# Patient Record
Sex: MALE | Race: WHITE | NOT HISPANIC OR LATINO | Employment: STUDENT | URBAN - METROPOLITAN AREA
[De-identification: names, ages, dates, MRNs, and addresses within clinical notes are randomized per-mention and may not be internally consistent; named-entity substitution may affect disease eponyms.]

---

## 2017-01-16 ENCOUNTER — OFFICE VISIT (OUTPATIENT)
Dept: ORTHOPEDICS | Facility: CLINIC | Age: 22
End: 2017-01-16
Payer: COMMERCIAL

## 2017-01-16 VITALS — SYSTOLIC BLOOD PRESSURE: 125 MMHG | HEART RATE: 66 BPM | DIASTOLIC BLOOD PRESSURE: 75 MMHG | HEIGHT: 67 IN

## 2017-01-16 DIAGNOSIS — Z09 S/P ORTHOPEDIC SURGERY, FOLLOW-UP EXAM: Primary | ICD-10-CM

## 2017-01-16 DIAGNOSIS — M25.371 INSTABILITY OF ANKLE JOINT, RIGHT: ICD-10-CM

## 2017-01-16 PROCEDURE — 99999 PR PBB SHADOW E&M-EST. PATIENT-LVL III: CPT | Mod: PBBFAC,,, | Performed by: ORTHOPAEDIC SURGERY

## 2017-01-16 PROCEDURE — 99024 POSTOP FOLLOW-UP VISIT: CPT | Mod: S$GLB,,, | Performed by: ORTHOPAEDIC SURGERY

## 2017-01-17 NOTE — PROGRESS NOTES
Mr. Mendez returns today.  He is now about four weeks out from his right   anterolateral ligament reconstruction.  He has been in a fracture boot since his   last visit and he has remained nonweightbearing.  He reports his pain is well   controlled.  Examination reveals that his wounds are well healed.  There appears   to be good stability of the ankle with anterior drawer maneuver.  At this   point, I am going to let him start progressing his weightbearing and I am going   to have him start physical therapy.  I am going to have him return to see me in   six weeks.      JACK/EDMAR  dd: 01/16/2017 11:06:20 (CST)  td: 01/17/2017 04:46:54 (CST)  Doc ID   #[id number]  Job ID #[id number]    CC:

## 2017-01-23 ENCOUNTER — CLINICAL SUPPORT (OUTPATIENT)
Dept: REHABILITATION | Facility: HOSPITAL | Age: 22
End: 2017-01-23
Attending: ORTHOPAEDIC SURGERY
Payer: COMMERCIAL

## 2017-01-23 DIAGNOSIS — Z98.890 S/P ANKLE LIGAMENT REPAIR: ICD-10-CM

## 2017-01-23 PROCEDURE — 97162 PT EVAL MOD COMPLEX 30 MIN: CPT | Mod: PO

## 2017-01-23 PROCEDURE — 97112 NEUROMUSCULAR REEDUCATION: CPT | Mod: PO

## 2017-01-23 NOTE — PROGRESS NOTES
Name: Flo Mendez Jr.  Clinic Number: [id number]  01/23/2017.     Diagnosis: instability of R ankle   Physician: Gerald Ruiz MD  Treatment Orders: PT Eval and Treat    No past medical history on file.  No current outpatient prescriptions on file.     No current facility-administered medications for this visit.      Review of patient's allergies indicates:  No Known Allergies  Precautions: WB as tolerated      Subjective:    Previous PT: yes; hip surgery last year on R hip; states no problems with hip; the expectation was that fixing the hip would help the back, but it didn't   Work history: in school, studying finance at [employer]   Recreational activities/exercise program: recently, not much; was running but it hurt his back. Would like to get back to it.      Flo is a 21 y.o. male referred to PT s/p Right anterolateral ligament reconstruction, Brostrom procedure with Arthrex internal brace augmentation, performed on 12/13/2016. He was NWB until 1 week ago. Was instructed to walk until sxs start, and he can start walking without the boot around the house, per sxs. Initially injured the ankle 6 years ago playing soccer. States he broke something with it with the injury. That injury was repaired.  Was also having back pain for 2 years, unknown origin, and the hope was to improve walking motion to help relieve back pain. No specific injury to back. States no structural problems, states muscles are tight. Activities that increase in back are sitting for prolonged periods of time and intense activities. Laying flat helps his back. Was doing back specific things at PT but it didn't help his back. Would like to get back to running. Currently can walk about 1 hour without sxs, sxs started at a party after 2 hours. sxs will go away after sitting for a little bit. States swelling has gone down a lot.     Pain is rated on a 0-10 scale with 0 being no pain and 10 being pain requiring emergency room  "visit.    Diagnostic Tests: MRI    Patient Goals for PT: return to running, normal walking, recreational sports      Objective:      Visit # 1  Time In: 1100  Time Out: 1200  Treatment time: 60  Date of eval/re-eval: 1/23/2017    DTR R/L: Biceps C5/6 NL / NL Patella L4 NL / NL Achilles S2 NL / NL DF NL / NL   NL 1+, 2+, 3+; ABS 0; HYP 4+; RAYMON 5+    Light touch R/L: Ant thigh L2 NL / NL Med knee L3 NL / NL Med malleolus L4 NL / NL Med heel L5 NL / NL Lat foot S1 NL / NL Med foot S2 NL / NL    NL intact; DIM diminished     Alignment/appearance:   Thoracic spine:  --> nl --> inc   Lumbar spine:  --> nl --> inc  Pelvic tilt:  --> ant    Pelvic rotation:   nl     Strength R/L WFl throughout LE except    Heel raise S1 2x  / 15x       AROM tests R/L   Selective Functional Movement Assessment (SFMA)  Coding:  FN = functional non-painful FP = functional painful     DP = dysfunctional painful  DN = dysfunctional non-painful     Multi-segmental flexion FP    Return from flexion -  Multi-segmental extension FP    Multi-segmental rotation FP / DN    Trunk side-bend nt / nt   Single leg balance unable / 30"    Deep squat nt  SL squat nt / nt     gillet test/march in place excessive R rotation with R march     PT Evaluation Complete? YES  Discussed Plan of Care with patient: YES     [2] Neuromuscular re-ed x 30'  Prone hip extension over pillow to reduce l/s extension; 3 x 8 LLE cues to engage glute first   Quadruped shoulder flexion elbow to hip 2 x 8 done with exhale   90/90 breathing with RLE weight bearing on wall/isometrics 8x breath cycles      Written HEP Provided: yes  Patient education: HEP; protocols  Flo reid good understanding of the education provided. Patient demo good return demo of skill of exercises.    Problem List: muscle length impairments, decreased motor control and mobility, impaired ADLs, activity and participation restrictions.     Personal factors - back pain; multiple surgeries     CPT Code reference    " "    Hx  Exam  Presentation   Code     Low     0   1-2    Stable    78496     Mod     1-2   3    Evolving    56196     High     3+   4+     Unstable    61200       Assessment:    Physical therapy diagnosis: lumbar rotation syndrome, ankle pronation syndrome  Rehab potential: good    Flo presents with the above listed impairments.  Excessive low back rotation compensation 2/2 impairments in the RLE. Balance deficits noted on RLE/ankle.    Flo will benefit from skilled PT services to address these impairments and progress towards the below listed functional goals.  Flo is in agreement with the goals that will be addressed in the treatment plan.Flo demonstrates no additional cultural, spiritual or educational needs and currently has no barriers to learning.      Medical necessity: see problem list -  indicates  / mod /  complexity based on clinical presentation that is  / evolving based on sxs in low back affecting rehab and contributingto LE impairments.       Functional Goals  Time frame     1.   The pt will perform SLB on RLE for > 30" indicated improved functional strength.   6 weeks     2.   The pt will demonstrate > 15 heel raises on RLE for improved functional strength.   6 weeks     3.   The pt will ambulate safely and [I] without sxs for > 2 hours without increase in sxs.   6 weeks     4.   The pt will be independent in performance and progression of HEP.     2-3 visits            Plan:      Proceed/Continue with POC.     Pt will be treated by physical therapy 1-2x/week during the certification period. Treatment will consist of therapeutic exercise, manual therapy, neuromuscular re-education, heat and cold modalities, electrical stimulation for pain relief and/or muscle activation, and any other types of treatment hat may be deemed medically necessary. Flo may at times be seen by a PTA as part of the Rehab Team.       Physical Therapist: AYO Ayoub, PT, DPT, CSCS    I certify the need for these " services furnished under this plan of treatment and while under my care.       ___________________________________  Physician/Referring Practitioner        _________________  Date of Signature

## 2017-01-30 ENCOUNTER — CLINICAL SUPPORT (OUTPATIENT)
Dept: REHABILITATION | Facility: HOSPITAL | Age: 22
End: 2017-01-30
Attending: ORTHOPAEDIC SURGERY
Payer: COMMERCIAL

## 2017-01-30 DIAGNOSIS — Z98.890 S/P ANKLE LIGAMENT REPAIR: ICD-10-CM

## 2017-01-30 PROCEDURE — 97112 NEUROMUSCULAR REEDUCATION: CPT

## 2017-02-03 ENCOUNTER — CLINICAL SUPPORT (OUTPATIENT)
Dept: REHABILITATION | Facility: HOSPITAL | Age: 22
End: 2017-02-03
Attending: ORTHOPAEDIC SURGERY
Payer: COMMERCIAL

## 2017-02-03 DIAGNOSIS — Z98.890 S/P ANKLE LIGAMENT REPAIR: ICD-10-CM

## 2017-02-03 PROCEDURE — 97112 NEUROMUSCULAR REEDUCATION: CPT | Mod: PO

## 2017-02-03 NOTE — PROGRESS NOTES
"Name: Flo Mendez Jr.  Clinic Number: [id number]  02/03/2017.     Diagnosis: instability of R ankle   Physician: Gerald Ruiz MD  Treatment Orders: PT Eval and Treat    No past medical history on file.  No current outpatient prescriptions on file.     No current facility-administered medications for this visit.      Review of patient's allergies indicates:  No Known Allergies  Precautions: WB as tolerated      Subjective:    Previous PT: yes; hip surgery last year on R hip; states no problems with hip; the expectation was that fixing the hip would help the back, but it didn't   Work history: in school, studying finance at [employer]   Recreational activities/exercise program: recently, not much; was running but it hurt his back. Would like to get back to it.      Flo states sxs on foot when trying to balance. More sxs with shoes than without.     Pain is rated on a 0-10 scale with 0 being no pain and 10 being pain requiring emergency room visit.    Diagnostic Tests: MRI    Patient Goals for PT: return to running, normal walking, recreational sports      Objective:      Visit # 3  Time In: 1000  Time Out: 1100  Treatment time: 60  Date of eval/re-eval: 1/23/2017        SLB RLE 17"   Side planks R/L 35" / 39"   Heel raises RLE 10x     [2] Neuromuscular re-ed x 30'  Inv/ever 20x GTB  Breathing drills forced exhale 15x   Prone hip extension over pillow 8x R/L, no pillow 8x R/L   Bird dog working arm off table 3x8 LUE, 8x RLE  Standing hip ext 8x, 8x GTB R/L with ski pole   plyo toss 2 hand 2 x 20 R/L   Tandem walking 2 x 10' 15#, 2 x 10' 25#  SLB leaning over table 3 x 30"   Heel raises 2 x 12 R/L leaning over table toes elevated on towel          Written HEP Provided: yes  Patient education: HEP items in standing or in modified quadruped based on sxs.   Flo mathews good understanding of the education provided. Patient demo good return demo of skill of exercises.    Problem List: muscle length impairments, " "decreased motor control and mobility, impaired ADLs, activity and participation restrictions.     Personal factors - back pain; multiple surgeries     CPT Code reference        Hx  Exam  Presentation   Code     Low     0   1-2    Stable    84060     Mod     1-2   3    Evolving    60235     High     3+   4+     Unstable    97580       Assessment:    Physical therapy diagnosis: lumbar rotation syndrome, ankle pronation syndrome  Rehab potential: good    Flo tolerated tx without increase in sxs. Balance improving. Tends to rotate to L with activities.     Flo will benefit from skilled PT services to address these impairments and progress towards the below listed functional goals.  Flo is in agreement with the goals that will be addressed in the treatment plan.Flo demonstrates no additional cultural, spiritual or educational needs and currently has no barriers to learning.      Medical necessity: see problem list -  indicates  / mod /  complexity based on clinical presentation that is  / evolving based on sxs in low back affecting rehab and contributingto LE impairments.       Functional Goals  Time frame     1.   The pt will perform SLB on RLE for > 30" indicated improved functional strength.   6 weeks     2.   The pt will demonstrate > 15 heel raises on RLE for improved functional strength.   6 weeks     3.   The pt will ambulate safely and [I] without sxs for > 2 hours without increase in sxs.   6 weeks     4.   The pt will be independent in performance and progression of HEP.     2-3 visits            Plan:      Proceed/Continue with POC.     Pt will be treated by physical therapy 1-2x/week during the certification period. Treatment will consist of therapeutic exercise, manual therapy, neuromuscular re-education, heat and cold modalities, electrical stimulation for pain relief and/or muscle activation, and any other types of treatment hat may be deemed medically necessary. Flo may at times be seen by a PTA as " part of the Rehab Team.       Physical Therapist: AYO Ayoub, PT, DPT, CSCS    I certify the need for these services furnished under this plan of treatment and while under my care.       ___________________________________  Physician/Referring Practitioner        _________________  Date of Signature

## 2017-02-10 ENCOUNTER — CLINICAL SUPPORT (OUTPATIENT)
Dept: REHABILITATION | Facility: HOSPITAL | Age: 22
End: 2017-02-10
Attending: ORTHOPAEDIC SURGERY
Payer: COMMERCIAL

## 2017-02-10 DIAGNOSIS — Z98.890 S/P ANKLE LIGAMENT REPAIR: ICD-10-CM

## 2017-02-10 PROCEDURE — 97112 NEUROMUSCULAR REEDUCATION: CPT | Mod: PO

## 2017-02-10 PROCEDURE — 97110 THERAPEUTIC EXERCISES: CPT | Mod: PO

## 2017-02-10 PROCEDURE — 97140 MANUAL THERAPY 1/> REGIONS: CPT | Mod: PO

## 2017-02-10 NOTE — PROGRESS NOTES
"Name: Flo Mendez Jr.  Clinic Number: [id number]  02/10/2017.     Diagnosis: instability of R ankle   Physician: Gerald Ruiz MD  Treatment Orders: PT Eval and Treat    No past medical history on file.  No current outpatient prescriptions on file.     No current facility-administered medications for this visit.      Review of patient's allergies indicates:  No Known Allergies  Precautions: WB as tolerated      Subjective:    Previous PT: yes; hip surgery last year on R hip; states no problems with hip; the expectation was that fixing the hip would help the back, but it didn't   Work history: in school, studying finance at [employer]   Recreational activities/exercise program: recently, not much; was running but it hurt his back. Would like to get back to it.      Flo reports sxs around incision with driving that radiate around to medial ankle. sxs are noticeable when driving.     Pain is rated on a 0-10 scale with 0 being no pain and 10 being pain requiring emergency room visit.    Diagnostic Tests: MRI    Patient Goals for PT: return to running, normal walking, recreational sports      Objective:      Visit # 4  Time In: 1000  Time Out: 1100  Treatment time: 60  Date of eval/re-eval: 1/23/2017        SLB RLE 17"   Side planks R/L 35" / 39"   Heel raises RLE 10x     Elliptical 5'     [1] Manual therapy x 15'   IASTM lateral ankle/incision/peroneal tendon    [2] Neuromuscular re-ed x 30'  Bird dog with slider 3 x 8   Ball toss 20x yellow, 20x blue, 20x blue toes elevated R/L   pallof press split stance 2 x 8 R/L facing/foot forward   Next visit - side plank, bird dog, dead bug     [1] Therapeutic exercise x 15'   B heel raises 2 x 20 on slant board   Single heel raise 2 x 12 R/L slant board   Next visit - leg press      Written HEP Provided: yes  Patient education: HEP items in standing or in modified quadruped based on sxs.   Flo mathews good understanding of the education provided. Patient demo good return " "demo of skill of exercises.    Problem List: muscle length impairments, decreased motor control and mobility, impaired ADLs, activity and participation restrictions.     Personal factors - back pain; multiple surgeries     CPT Code reference        Hx  Exam  Presentation   Code     Low     0   1-2    Stable    19053     Mod     1-2   3    Evolving    11314     High     3+   4+     Unstable    37723       Assessment:    Physical therapy diagnosis: lumbar rotation syndrome, ankle pronation syndrome  Rehab potential: good    Flo tolerated tx without increase in sxs. Reduced sxs with STM on scar. Will begin LE strengthening next visit.   Flo will benefit from skilled PT services to address these impairments and progress towards the below listed functional goals.  Flo is in agreement with the goals that will be addressed in the treatment plan.Flo demonstrates no additional cultural, spiritual or educational needs and currently has no barriers to learning.      Medical necessity: see problem list -  indicates  / mod /  complexity based on clinical presentation that is  / evolving based on sxs in low back affecting rehab and contributingto LE impairments.       Functional Goals  Time frame     1.   The pt will perform SLB on RLE for > 30" indicated improved functional strength.   6 weeks     2.   The pt will demonstrate > 15 heel raises on RLE for improved functional strength.   6 weeks     3.   The pt will ambulate safely and [I] without sxs for > 2 hours without increase in sxs.   6 weeks     4.   The pt will be independent in performance and progression of HEP.     2-3 visits            Plan:      Proceed/Continue with POC.     Pt will be treated by physical therapy 1-2x/week during the certification period. Treatment will consist of therapeutic exercise, manual therapy, neuromuscular re-education, heat and cold modalities, electrical stimulation for pain relief and/or muscle activation, and any other types of " treatment hat may be deemed medically necessary. Flo may at times be seen by a PTA as part of the Rehab Team.       Physical Therapist: AYO Ayoub, PT, DPT, CSCS    I certify the need for these services furnished under this plan of treatment and while under my care.       ___________________________________  Physician/Referring Practitioner        _________________  Date of Signature

## 2017-02-14 ENCOUNTER — CLINICAL SUPPORT (OUTPATIENT)
Dept: REHABILITATION | Facility: HOSPITAL | Age: 22
End: 2017-02-14
Attending: ORTHOPAEDIC SURGERY
Payer: COMMERCIAL

## 2017-02-14 DIAGNOSIS — Z98.890 S/P ANKLE LIGAMENT REPAIR: ICD-10-CM

## 2017-02-14 PROCEDURE — 97112 NEUROMUSCULAR REEDUCATION: CPT | Mod: PO

## 2017-02-14 PROCEDURE — 97140 MANUAL THERAPY 1/> REGIONS: CPT | Mod: PO

## 2017-02-14 PROCEDURE — 97110 THERAPEUTIC EXERCISES: CPT | Mod: PO

## 2017-02-14 NOTE — PROGRESS NOTES
"Name: Flo Mendez Jr.  Clinic Number: [id number]  02/14/2017.     Diagnosis: instability of R ankle   Physician: Gerald Ruiz MD  Treatment Orders: PT Eval and Treat    No past medical history on file.  No current outpatient prescriptions on file.     No current facility-administered medications for this visit.      Review of patient's allergies indicates:  No Known Allergies  Precautions: WB as tolerated      Subjective:    Previous PT: yes; hip surgery last year on R hip; states no problems with hip; the expectation was that fixing the hip would help the back, but it didn't   Work history: in school, studying finance at [employer]   Recreational activities/exercise program: recently, not much; was running but it hurt his back. Would like to get back to it.      Flo reports sxs continue but are reduced.     Pain is rated on a 0-10 scale with 0 being no pain and 10 being pain requiring emergency room visit.    Diagnostic Tests: MRI    Patient Goals for PT: return to running, normal walking, recreational sports      Objective:      Visit # 5  Time In: 1630  Time Out: 1730  Treatment time: 60  Date of eval/re-eval: 1/23/2017        SLB RLE 17"   Side planks R/L 35" / 39"   Heel raises RLE 10x     Elliptical 5'     [1] Manual therapy x 15'   IASTM lateral ankle/incision/peroneal tendon/ anterior ankle    [1] Neuromuscular re-ed x 30'  SLB lateral walk 2 x 20 R/L   Diagonal walk 2 x 20 R/L   Ball toss SL fwd/back 2 x 20 blue ball     [1] Therapeutic exercise x 15'   Eversion/inv 3 x 20 GTB  Heel raise slant board 3 x 12 R/L   Leg press 5x 100, 110, 120#       Written HEP Provided: yes  Patient education: HEP items in standing or in modified quadruped based on sxs.   Flo demo good understanding of the education provided. Patient demo good return demo of skill of exercises.    Problem List: muscle length impairments, decreased motor control and mobility, impaired ADLs, activity and participation restrictions.   " "  Personal factors - back pain; multiple surgeries     CPT Code reference        Hx  Exam  Presentation   Code     Low     0   1-2    Stable    63533     Mod     1-2   3    Evolving    32467     High     3+   4+     Unstable    15664       Assessment:    Physical therapy diagnosis: lumbar rotation syndrome, ankle pronation syndrome  Rehab potential: good    Flo tolerated tx without increase in sxs. Reduced sxs with STM on scar. Progressing to lateral and diagonal balance activities.  Flo will benefit from skilled PT services to address these impairments and progress towards the below listed functional goals.  Flo is in agreement with the goals that will be addressed in the treatment plan.Flo demonstrates no additional cultural, spiritual or educational needs and currently has no barriers to learning.      Medical necessity: see problem list -  indicates  / mod /  complexity based on clinical presentation that is  / evolving based on sxs in low back affecting rehab and contributingto LE impairments.       Functional Goals  Time frame     1.   The pt will perform SLB on RLE for > 30" indicated improved functional strength.   6 weeks     2.   The pt will demonstrate > 15 heel raises on RLE for improved functional strength.   6 weeks     3.   The pt will ambulate safely and [I] without sxs for > 2 hours without increase in sxs.   6 weeks     4.   The pt will be independent in performance and progression of HEP.     2-3 visits            Plan:      Proceed/Continue with POC.     Pt will be treated by physical therapy 1-2x/week during the certification period. Treatment will consist of therapeutic exercise, manual therapy, neuromuscular re-education, heat and cold modalities, electrical stimulation for pain relief and/or muscle activation, and any other types of treatment hat may be deemed medically necessary. Flo may at times be seen by a PTA as part of the Rehab Team.       Physical Therapist: AYO Ayoub, PT, " DPT, CSCS    I certify the need for these services furnished under this plan of treatment and while under my care.       ___________________________________  Physician/Referring Practitioner        _________________  Date of Signature

## 2017-02-22 ENCOUNTER — CLINICAL SUPPORT (OUTPATIENT)
Dept: REHABILITATION | Facility: HOSPITAL | Age: 22
End: 2017-02-22
Attending: ORTHOPAEDIC SURGERY
Payer: COMMERCIAL

## 2017-02-22 DIAGNOSIS — Z98.890 S/P ANKLE LIGAMENT REPAIR: ICD-10-CM

## 2017-02-22 PROCEDURE — 97110 THERAPEUTIC EXERCISES: CPT | Mod: PO

## 2017-02-22 PROCEDURE — 97112 NEUROMUSCULAR REEDUCATION: CPT | Mod: PO

## 2017-02-22 PROCEDURE — 97140 MANUAL THERAPY 1/> REGIONS: CPT | Mod: PO

## 2017-02-22 NOTE — PROGRESS NOTES
"Name: Flo Mendez Jr.  Clinic Number: [id number]  02/22/2017.     Diagnosis: instability of R ankle   Physician: Gerald Ruiz MD  Treatment Orders: PT Eval and Treat    No past medical history on file.  No current outpatient prescriptions on file.     No current facility-administered medications for this visit.      Review of patient's allergies indicates:  No Known Allergies  Precautions: WB as tolerated      Subjective:    Previous PT: yes; hip surgery last year on R hip; states no problems with hip; the expectation was that fixing the hip would help the back, but it didn't   Work history: in school, studying finance at [employer]   Recreational activities/exercise program: recently, not much; was running but it hurt his back. Would like to get back to it.      Flo reports sxs continue. No longer has sxs when driving.     Pain is rated on a 0-10 scale with 0 being no pain and 10 being pain requiring emergency room visit.    Diagnostic Tests: MRI    Patient Goals for PT: return to running, normal walking, recreational sports      Objective:      Visit # 6  Time In: 1330  Time Out: 1430  Treatment time: 60  Date of eval/re-eval: 1/23/2017        SLB RLE 17"   Side planks R/L 35" / 39"   Heel raises RLE 10x     Elliptical 5'     [1] Manual therapy x 15'   IASTM lateral ankle/incision/peroneal tendon/ anterior ankle    [1] Neuromuscular re-ed x 30'  SLB lateral walk 2 x 20 R/L   Diagonal walk 2 x 20 R/L   Ball toss SL fwd/back 2 x 20 blue ball NP     [2] Therapeutic exercise x 15'   Eversion/inv 3 x 20 GTB NP  Heel raise slant board 3 x 12 R/L   Leg press 5x 110, 130, 150#   Bird dogs with slider 2 x 8       Written HEP Provided: yes  Patient education: HEP items in standing or in modified quadruped based on sxs.   Flo demo good understanding of the education provided. Patient demo good return demo of skill of exercises.    Problem List: muscle length impairments, decreased motor control and mobility, " "impaired ADLs, activity and participation restrictions.     Personal factors - back pain; multiple surgeries     CPT Code reference        Hx  Exam  Presentation   Code     Low     0   1-2    Stable    54549     Mod     1-2   3    Evolving    85382     High     3+   4+     Unstable    32761       Assessment:    Physical therapy diagnosis: lumbar rotation syndrome, ankle pronation syndrome  Rehab potential: good    Flo tolerated tx without increase in sxs. Progressing strength and dynamic balance.   Flo will benefit from skilled PT services to address these impairments and progress towards the below listed functional goals.  Flo is in agreement with the goals that will be addressed in the treatment plan.Flo demonstrates no additional cultural, spiritual or educational needs and currently has no barriers to learning.      Medical necessity: see problem list -  indicates  / mod /  complexity based on clinical presentation that is  / evolving based on sxs in low back affecting rehab and contributingto LE impairments.       Functional Goals  Time frame     1.   The pt will perform SLB on RLE for > 30" indicated improved functional strength.   6 weeks     2.   The pt will demonstrate > 15 heel raises on RLE for improved functional strength.   6 weeks     3.   The pt will ambulate safely and [I] without sxs for > 2 hours without increase in sxs.   6 weeks     4.   The pt will be independent in performance and progression of HEP.     2-3 visits            Plan:      Proceed/Continue with POC.     Pt will be treated by physical therapy 1-2x/week during the certification period. Treatment will consist of therapeutic exercise, manual therapy, neuromuscular re-education, heat and cold modalities, electrical stimulation for pain relief and/or muscle activation, and any other types of treatment hat may be deemed medically necessary. Flo may at times be seen by a PTA as part of the Rehab Team.       Physical Therapist: AYO" Anupam Ayoub, PT, DPT, CSCS    I certify the need for these services furnished under this plan of treatment and while under my care.       ___________________________________  Physician/Referring Practitioner        _________________  Date of Signature

## 2017-03-20 ENCOUNTER — TELEPHONE (OUTPATIENT)
Dept: ORTHOPEDICS | Facility: CLINIC | Age: 22
End: 2017-03-20

## 2017-03-20 NOTE — TELEPHONE ENCOUNTER
----- Message from Brown Rodrigues sent at 3/20/2017 12:59 PM CDT -----  Contact: self@  Pt called to reschedule his post-op appt    Call back is [phone]    Thank you

## 2017-03-24 ENCOUNTER — TELEPHONE (OUTPATIENT)
Dept: ORTHOPEDICS | Facility: CLINIC | Age: 22
End: 2017-03-24

## 2017-03-24 NOTE — TELEPHONE ENCOUNTER
----- Message from Simone Mondragon sent at 3/24/2017 10:42 AM CDT -----  Contact: self  Pt called to reschedule his PO

## 2017-03-27 ENCOUNTER — TELEPHONE (OUTPATIENT)
Dept: ORTHOPEDICS | Facility: CLINIC | Age: 22
End: 2017-03-27

## 2017-03-27 ENCOUNTER — OFFICE VISIT (OUTPATIENT)
Dept: ORTHOPEDICS | Facility: CLINIC | Age: 22
End: 2017-03-27
Payer: COMMERCIAL

## 2017-03-27 ENCOUNTER — HOSPITAL ENCOUNTER (OUTPATIENT)
Dept: RADIOLOGY | Facility: HOSPITAL | Age: 22
Discharge: HOME | End: 2017-03-27
Attending: ORTHOPAEDIC SURGERY
Payer: COMMERCIAL

## 2017-03-27 VITALS — WEIGHT: 185.19 LBS | BODY MASS INDEX: 29.07 KG/M2 | HEIGHT: 67 IN

## 2017-03-27 DIAGNOSIS — R52 PAIN: ICD-10-CM

## 2017-03-27 DIAGNOSIS — M25.371 INSTABILITY OF ANKLE JOINT, RIGHT: ICD-10-CM

## 2017-03-27 DIAGNOSIS — Z09 S/P ORTHOPEDIC SURGERY, FOLLOW-UP EXAM: ICD-10-CM

## 2017-03-27 DIAGNOSIS — R52 PAIN: Primary | ICD-10-CM

## 2017-03-27 PROCEDURE — 99024 POSTOP FOLLOW-UP VISIT: CPT | Mod: S$GLB,,, | Performed by: ORTHOPAEDIC SURGERY

## 2017-03-27 PROCEDURE — 99999 PR PBB SHADOW E&M-EST. PATIENT-LVL III: CPT | Mod: PBBFAC,,, | Performed by: ORTHOPAEDIC SURGERY

## 2017-03-27 PROCEDURE — 73610 X-RAY EXAM OF ANKLE: CPT | Mod: 26,RT,, | Performed by: RADIOLOGY

## 2017-03-27 PROCEDURE — 73610 X-RAY EXAM OF ANKLE: CPT | Mod: TC,RT

## 2017-03-27 NOTE — TELEPHONE ENCOUNTER
----- Message from Elina Wong sent at 3/27/2017 11:39 AM CDT -----  Contact: self@ home   Pt is calling to reschedule his post-op appt.

## 2017-03-27 NOTE — MR AVS SNAPSHOT
"    [facility]  [address]  [facility] [address]  Phone: [phone]                  Flo Mendez Jr.   3/27/2017 2:45 PM   Office Visit    Description:  Male : 1995   Provider:  Gerald Ruiz MD   Department:  [facility]           Reason for Visit     Right Ankle - Pain, Post-op Evaluation     Post-op Evaluation           Diagnoses this Visit        Comments    Pain    -  Primary     S/P orthopedic surgery, follow-up exam         Instability of ankle joint, right                To Do List           Goals (5 Years of Data)     None      [facility]     [facility] Care [facility] -  Assistance  Registered nurses in the [facility] provide clinical advisement, health education, appointment booking, and other advisory services.  Call for this free service at [phone].             Medications           Message regarding Medications     Verify the changes and/or additions to your medication regime listed below are the same as discussed with your clinician today.  If any of these changes or additions are incorrect, please notify your healthcare provider.             Verify that the below list of medications is an accurate representation of the medications you are currently taking.  If none reported, the list may be blank. If incorrect, please contact your healthcare provider. Carry this list with you in case of emergency.                Clinical Reference Information           Your Vitals Were     Height Weight BMI          5' 7" (1.702 m) 84 kg (185 lb 3 oz) 29 kg/m2        Allergies as of 3/27/2017     No Known Allergies      Immunizations Administered on Date of Encounter - 3/27/2017     None      Orders Placed During Today's Visit      Normal Orders This Visit    Ambulatory Referral to Physical/Occupational Therapy     Future Labs/Procedures Expected by Expires    X-Ray Ankle Complete Right  3/27/2017 3/27/2018      Language " Assistance Services     ATTENTION: Language assistance services are available, free of charge. Please call [phone].      ATENCIÓN: Si habla yevgeniyañol, tiene a win disposición servicios gratuitos de asistencia lingüística. Llame al [phone].     CHÚ Ý: N?u b?n nói Ti?ng Vi?t, có các d?ch v? h? tr? ngôn ng? mi?n phí dành cho b?n. G?i s? [phone].         [facility] complies with applicable Federal civil rights laws and does not discriminate on the basis of race, color, national origin, age, disability, or sex.

## 2017-03-28 NOTE — PROGRESS NOTES
Mr. Mendez returns today.  He is now 10 weeks postop from his right ankle   anterolateral ligament reconstruction.  Since his last visit, he has started   physical therapy.  He states he has made some progress, but reports some   lateral-sided pain and he still reports some feelings of instability.  He is   currently in therapy.  Examination today reveals some mild swelling.  On   standing inspection, his foot has a tendency to pronate and he states this is   when he gets more of a pinching pain laterally.  On sitting exam, he has   functional motion of his ankle and subtalar joint.  He does have some tenderness   in the sinus tarsi area.  He has good peroneus brevis tendon strength.  His   ankle stability appears to be improved from his preop examination.  I ordered an   x-ray today and there are no signs of any bony abnormalities.  At this point, I   think all we can do is give this further time.  I am going to have him continue   with his physical therapy.  I suggested that he obtain some over-the-counter   arch supports, which will help support his arch.  I am going to have him return   to see me in six weeks for followup.      JACK/EDMAR  dd: 03/28/2017 06:26:52 (CDT)  td: 03/28/2017 17:35:29 (RUSS)  Doc ID   #[id number]  Job ID #[id number]    CC:

## 2017-03-31 ENCOUNTER — CLINICAL SUPPORT (OUTPATIENT)
Dept: REHABILITATION | Facility: HOSPITAL | Age: 22
End: 2017-03-31
Attending: ORTHOPAEDIC SURGERY
Payer: COMMERCIAL

## 2017-03-31 DIAGNOSIS — Z98.890 S/P ANKLE LIGAMENT REPAIR: ICD-10-CM

## 2017-03-31 PROCEDURE — 97035 APP MDLTY 1+ULTRASOUND EA 15: CPT | Mod: PO | Performed by: PHYSICAL THERAPIST

## 2017-03-31 PROCEDURE — 97110 THERAPEUTIC EXERCISES: CPT | Mod: PO | Performed by: PHYSICAL THERAPIST

## 2017-03-31 PROCEDURE — 97112 NEUROMUSCULAR REEDUCATION: CPT | Mod: PO | Performed by: PHYSICAL THERAPIST

## 2017-03-31 NOTE — PROGRESS NOTES
"Name: Flo Mendez Jr.  Clinic Number: [id number]  2017.     Diagnosis: instability of R ankle   Physician: Gerald Ruiz MD  Treatment Orders: PT Eval and Treat    No past medical history on file.  No current outpatient prescriptions on file.     No current facility-administered medications for this visit.      Review of patient's allergies indicates:  No Known Allergies  Precautions: WB as tolerated      Visit # 7  Time In: 1400  Time Out: 1455  Treatment time: 55  Date of eval/re-eval: 2017    Subjective:    Pt states: "I still have some post surgery pain with a few movements and it feels weak" "I'd like to get a more intense home program"  Pain: 0/10       Objective:      The patient received the following direct contact modalities after being cleared for contraindications: Ultrasound:  Flo received ultrasound to manage pain and inflammation at 20 % duty cycle applied to the R ankle at an intensity of  1 W/cm2  for a duration of 8 minutes. Patient tolerated treatment well without adverse effects. Therapist was in attendance throughout intervention.        Pt received therapeutic exercises to develop strength, endurance, ROM and flexibility for 20 minutes includin way ankle: x 30 with OTB  Heel raises RLE 30x         [1] Neuromuscular re-ed x 30'  SLB multidirectional reach (Y balance): 3 x 10  Sport cord SL squat: 2 x 1" with GTB  Sport cord lateral bounding: 2 x 1" both directions with red cord  Sport cord running movmements: 2 x 1' both directions with red cord        Written HEP Provided: yes  Patient education: for with new exercises for HEP  Flo mathews good understanding of the education provided. Patient demo good return demo of skill of exercises.    Problem List: muscle length impairments, decreased motor control and mobility, impaired ADLs, activity and participation restrictions.     Personal factors - back pain; multiple surgeries     CPT Code reference        Hx  Exam  " "Presentation   Code     Low     0   1-2    Stable    72882     Mod     1-2   3    Evolving    87432     High     3+   4+     Unstable    56015       Assessment:        Flo tolerated tx without increase in sxs. Pt progressed with dynamic balance exercises today working on single leg balance with leg movements using Y balance patterns, as well as sport cord exercises for balance and endurance..   Flo will benefit from skilled PT services to address these impairments and progress towards the below listed functional goals.  Flo is in agreement with the goals that will be addressed in the treatment plan.Flo demonstrates no additional cultural, spiritual or educational needs and currently has no barriers to learning.      Medical necessity: see problem list -  indicates  / mod /  complexity based on clinical presentation that is  / evolving based on sxs in low back affecting rehab and contributingto LE impairments.       Functional Goals  Time frame     1.   The pt will perform SLB on RLE for > 30" indicated improved functional strength.   6 weeks     2.   The pt will demonstrate > 15 heel raises on RLE for improved functional strength.   6 weeks     3.   The pt will ambulate safely and [I] without sxs for > 2 hours without increase in sxs.   6 weeks     4.   The pt will be independent in performance and progression of HEP.     2-3 visits            Plan:      Proceed/Continue with POC.     Pt will be treated by physical therapy 1-2x/week during the certification period. Treatment will consist of therapeutic exercise, manual therapy, neuromuscular re-education, heat and cold modalities, electrical stimulation for pain relief and/or muscle activation, and any other types of treatment hat may be deemed medically necessary. Flo may at times be seen by a PTA as part of the Rehab Team.       Physical Therapist: Miguel Coto, PT     I certify the need for these services furnished under this plan of treatment and while " under my care.       ___________________________________  Physician/Referring Practitioner        _________________  Date of Signature

## 2017-04-03 ENCOUNTER — CLINICAL SUPPORT (OUTPATIENT)
Dept: REHABILITATION | Facility: HOSPITAL | Age: 22
End: 2017-04-03
Attending: ORTHOPAEDIC SURGERY
Payer: COMMERCIAL

## 2017-04-03 DIAGNOSIS — M25.60 RANGE OF MOTION DEFICIT: ICD-10-CM

## 2017-04-03 DIAGNOSIS — Z98.890 S/P ANKLE LIGAMENT REPAIR: Primary | ICD-10-CM

## 2017-04-03 DIAGNOSIS — R26.9 ABNORMALITY OF GAIT: ICD-10-CM

## 2017-04-03 PROCEDURE — 97110 THERAPEUTIC EXERCISES: CPT | Mod: PO

## 2017-04-03 NOTE — PROGRESS NOTES
"Name: Flo Mendez Jr.  Clinic Number: [id number]  2017.     Diagnosis: instability of R ankle   Physician: Gerald Ruiz MD  Treatment Orders: PT Eval and Treat    No past medical history on file.  No current outpatient prescriptions on file.     No current facility-administered medications for this visit.      Review of patient's allergies indicates:  No Known Allergies  Precautions: WB as tolerated      Visit # 8  Time In: 1000am  Time Out: 111:00 am  Treatment time: 60  Date of eval/re-eval: 2017    Subjective:    Pt states: he has been compliant with HEP  Pain: 0/10       Objective:      The patient received the following direct contact modalities after being cleared for contraindications: Ultrasound:  Flo received ultrasound to manage pain and inflammation at 20 % duty cycle applied to the R ankle at an intensity of  1 W/cm2  for a duration of 8 minutes. Patient tolerated treatment well without adverse effects. Therapist was in attendance throughout intervention.        Pt received therapeutic exercises to develop strength, endurance, ROM and flexibility for 20 minutes includin way ankle: x 30 with OTB  Heel raises RLE 30 x         [1] Neuromuscular re-ed x 30'  SLB multidirectional reach (Y balance): 3 x 10  Sport cord SL squat: 2 x 1" with GTB  Sport cord lateral bounding: 2 x 1" both directions with red cord  Sport cord running movmements: 2 x 1' both directions with red cord  Shuttle Jumps: 3 x 30 seconds   Shuttle Runs: 3 x 30 seconds  SL Ball toss on foam: x 30         Written HEP Provided: yes  Patient education: for with new exercises for HEP  Flo mathews good understanding of the education provided. Patient demo good return demo of skill of exercises.    Problem List: muscle length impairments, decreased motor control and mobility, impaired ADLs, activity and participation restrictions.     Personal factors - back pain; multiple surgeries     CPT Code reference        Hx  " "Exam  Presentation   Code     Low     0   1-2    Stable    91642     Mod     1-2   3    Evolving    12418     High     3+   4+     Unstable    06248       Assessment:        Flo tolerated therapy session well. Patient with slight increase in pain with single leg activities at lateral ankle, but was able to complete all task. Patient with noted decreased balance and proprioception and will benefit from continued skilled PT.   Flo will benefit from skilled PT services to address these impairments and progress towards the below listed functional goals.  Flo is in agreement with the goals that will be addressed in the treatment plan.Flo demonstrates no additional cultural, spiritual or educational needs and currently has no barriers to learning.      Medical necessity: see problem list -  indicates  / mod /  complexity based on clinical presentation that is  / evolving based on sxs in low back affecting rehab and contributingto LE impairments.       Functional Goals  Time frame     1.   The pt will perform SLB on RLE for > 30" indicated improved functional strength.   6 weeks     2.   The pt will demonstrate > 15 heel raises on RLE for improved functional strength.   6 weeks     3.   The pt will ambulate safely and [I] without sxs for > 2 hours without increase in sxs.   6 weeks     4.   The pt will be independent in performance and progression of HEP.     2-3 visits            Plan:      Proceed/Continue with POC.     Pt will be treated by physical therapy 1-2x/week during the certification period. Treatment will consist of therapeutic exercise, manual therapy, neuromuscular re-education, heat and cold modalities, electrical stimulation for pain relief and/or muscle activation, and any other types of treatment hat may be deemed medically necessary. Flo may at times be seen by a PTA as part of the Rehab Team.       Physical Therapist: Harriett Gregg, PTA     I certify the need for these services furnished under " this plan of treatment and while under my care.       _

## 2017-04-05 ENCOUNTER — CLINICAL SUPPORT (OUTPATIENT)
Dept: REHABILITATION | Facility: HOSPITAL | Age: 22
End: 2017-04-05
Attending: ORTHOPAEDIC SURGERY
Payer: COMMERCIAL

## 2017-04-05 DIAGNOSIS — Z98.890 S/P ANKLE LIGAMENT REPAIR: ICD-10-CM

## 2017-04-05 PROCEDURE — 97112 NEUROMUSCULAR REEDUCATION: CPT | Mod: PO | Performed by: PHYSICAL THERAPIST

## 2017-04-05 PROCEDURE — 97035 APP MDLTY 1+ULTRASOUND EA 15: CPT | Mod: PO | Performed by: PHYSICAL THERAPIST

## 2017-04-05 NOTE — PROGRESS NOTES
"Name: Flo Mendez Jr.  Clinic Number: [id number]  2017.     Diagnosis: instability of R ankle   Physician: Gerald Ruiz MD  Treatment Orders: PT Eval and Treat    No past medical history on file.  No current outpatient prescriptions on file.     No current facility-administered medications for this visit.      Review of patient's allergies indicates:  No Known Allergies  Precautions: WB as tolerated      Visit # 9  Time In: 1043am  Time Out: 11:38 am  Treatment time: 60  Date of eval/re-eval: 2017    Subjective:    Pt states: "I feel like my ankle is getting worked in the right places"  Pain: 0/10       Objective:      The patient received the following direct contact modalities after being cleared for contraindications: Ultrasound:  Flo received ultrasound to manage pain and inflammation at 20 % duty cycle applied to the R ankle at an intensity of  1 W/cm2  for a duration of 8 minutes. Patient tolerated treatment well without adverse effects. Therapist was in attendance throughout intervention.        Pt received therapeutic exercises to develop strength, endurance, ROM and flexibility for 0  minutes includin way ankle: x 30 with OTB  Heel raises RLE 30 x         [1] Neuromuscular re-ed x 45'  SLB multidirectional reach (Y balance): 3 x 10  SL deadlift cone taps: 3 directions: x 20 each  Sport cord SL squat: 3 x 1" with Blue sport cord   Sport cord lateral bounding: 2 x 1" both directions with Blue cord  Sport cord running movmements: 2 x 1' both directions with Blue cord  Shuttle Jumps: 3 x 30 seconds  Shuttle Runs: 3 x 30 seconds  SL Ball toss on foam: x 30         Written HEP Provided: yes  Patient education: for with new exercises for HEP  Flo mathews good understanding of the education provided. Patient demo good return demo of skill of exercises.    Problem List: muscle length impairments, decreased motor control and mobility, impaired ADLs, activity and participation " "restrictions.     Personal factors - back pain; multiple surgeries     CPT Code reference        Hx  Exam  Presentation   Code     Low     0   1-2    Stable    95965     Mod     1-2   3    Evolving    74291     High     3+   4+     Unstable    42356       Assessment:        Flo tolerated therapy session well. Pt demonstrated improvement in balance activities today with decreased pain.   Flo will benefit from skilled PT services to address these impairments and progress towards the below listed functional goals.  Flo is in agreement with the goals that will be addressed in the treatment plan.Flo demonstrates no additional cultural, spiritual or educational needs and currently has no barriers to learning.      Medical necessity: see problem list -  indicates  / mod /  complexity based on clinical presentation that is  / evolving based on sxs in low back affecting rehab and contributingto LE impairments.       Functional Goals  Time frame     1.   The pt will perform SLB on RLE for > 30" indicated improved functional strength.   6 weeks     2.   The pt will demonstrate > 15 heel raises on RLE for improved functional strength.   6 weeks     3.   The pt will ambulate safely and [I] without sxs for > 2 hours without increase in sxs.   6 weeks     4.   The pt will be independent in performance and progression of HEP.     2-3 visits            Plan:      Proceed/Continue with POC.     Pt will be treated by physical therapy 1-2x/week during the certification period. Treatment will consist of therapeutic exercise, manual therapy, neuromuscular re-education, heat and cold modalities, electrical stimulation for pain relief and/or muscle activation, and any other types of treatment hat may be deemed medically necessary. Flo may at times be seen by a PTA as part of the Rehab Team.       Physical Therapist: Miguel Coto, PT     I certify the need for these services furnished under this plan of treatment and while under my " care.       _

## 2017-04-12 ENCOUNTER — CLINICAL SUPPORT (OUTPATIENT)
Dept: REHABILITATION | Facility: HOSPITAL | Age: 22
End: 2017-04-12
Attending: ORTHOPAEDIC SURGERY
Payer: COMMERCIAL

## 2017-04-12 DIAGNOSIS — Z98.890 S/P ANKLE LIGAMENT REPAIR: ICD-10-CM

## 2017-04-12 PROCEDURE — 97112 NEUROMUSCULAR REEDUCATION: CPT | Mod: PO | Performed by: PHYSICAL THERAPIST

## 2017-04-12 PROCEDURE — 97110 THERAPEUTIC EXERCISES: CPT | Mod: PO | Performed by: PHYSICAL THERAPIST

## 2017-04-12 NOTE — PROGRESS NOTES
"Name: Flo Mendez Jr.  Clinic Number: [id number]  04/12/2017.     Diagnosis: instability of R ankle   Physician: Gerald Ruiz MD  Treatment Orders: PT Eval and Treat    No past medical history on file.  No current outpatient prescriptions on file.     No current facility-administered medications for this visit.      Review of patient's allergies indicates:  No Known Allergies  Precautions: WB as tolerated      Visit # 10  Time In: 0950am  Time Out: 1045 am  Treatment time: 55  Date of eval/re-eval: 1/23/2017    Subjective:    Pt states: "my ankle feels a little better and stronger"  Pain: 0/10       Objective:            Pt received therapeutic exercises to develop strength, endurance, ROM and flexibility for 10 minutes including:     Elliptical: 6 minutes  Plantar fascia rolling with tennis ball x 3 minutes  4 way ankle: x 30 with OTB  - NP  Heel raises RLE 30 x - NP        [1] Neuromuscular re-ed x 45'  SLB multidirectional reach (Y balance): 3 x 10  SL Ball toss on foam: x 30   SL deadlift cone taps: 3 directions: x 20 each  Sport cord lateral bounding: 2 x 1" both directions with Blue cord  Sport cord lateral bounding with 90* rotations: 2 x 30" B with blue cord  Sport cord SL squat: 3 x 1" with Blue sport cord       Sport cord running movmements: 2 x 1' both directions with Blue cord - NP  Shuttle Jumps: 3 x 30 seconds - NP  Shuttle Runs: 3 x 30 seconds - NP          Written HEP Provided: yes  Patient education: for with new exercises for HEP  Flo mathews good understanding of the education provided. Patient demo good return demo of skill of exercises.    Problem List: muscle length impairments, decreased motor control and mobility, impaired ADLs, activity and participation restrictions.     Personal factors - back pain; multiple surgeries     CPT Code reference        Hx  Exam  Presentation   Code     Low     0   1-2    Stable    40673     Mod     1-2   3    Evolving    73405     High     3+   4+   " "  Unstable    65122       Assessment:        Flo tolerated therapy session well. Pt is demonstrating improving balance with his exercises. Pt instructed in STM using tennis ball for his plantar fascia   Flo will benefit from skilled PT services to address these impairments and progress towards the below listed functional goals.  Flo is in agreement with the goals that will be addressed in the treatment plan.Flo demonstrates no additional cultural, spiritual or educational needs and currently has no barriers to learning.      Medical necessity: see problem list -  indicates  / mod /  complexity based on clinical presentation that is  / evolving based on sxs in low back affecting rehab and contributingto LE impairments.       Functional Goals  Time frame     1.   The pt will perform SLB on RLE for > 30" indicated improved functional strength.   6 weeks     2.   The pt will demonstrate > 15 heel raises on RLE for improved functional strength.   6 weeks     3.   The pt will ambulate safely and [I] without sxs for > 2 hours without increase in sxs.   6 weeks     4.   The pt will be independent in performance and progression of HEP.     2-3 visits            Plan:      Proceed/Continue with POC.     Pt will be treated by physical therapy 1-2x/week during the certification period. Treatment will consist of therapeutic exercise, manual therapy, neuromuscular re-education, heat and cold modalities, electrical stimulation for pain relief and/or muscle activation, and any other types of treatment hat may be deemed medically necessary. Flo may at times be seen by a PTA as part of the Rehab Team.       Physical Therapist: Miguel Coto, PT     I certify the need for these services furnished under this plan of treatment and while under my care.       _    "

## 2017-05-03 ENCOUNTER — CLINICAL SUPPORT (OUTPATIENT)
Dept: REHABILITATION | Facility: HOSPITAL | Age: 22
End: 2017-05-03
Attending: ORTHOPAEDIC SURGERY
Payer: COMMERCIAL

## 2017-05-03 DIAGNOSIS — M25.60 RANGE OF MOTION DEFICIT: ICD-10-CM

## 2017-05-03 DIAGNOSIS — R26.9 ABNORMALITY OF GAIT: ICD-10-CM

## 2017-05-03 DIAGNOSIS — Z98.890 S/P ANKLE LIGAMENT REPAIR: Primary | ICD-10-CM

## 2017-05-03 PROCEDURE — 97110 THERAPEUTIC EXERCISES: CPT | Mod: PO

## 2017-05-03 NOTE — PROGRESS NOTES
"Name: Flo Mendez Jr.  Clinic Number: [id number]  05/03/2017.     Diagnosis: instability of R ankle   Physician: Gerald Ruiz MD  Treatment Orders: PT Eval and Treat    No past medical history on file.  No current outpatient prescriptions on file.     No current facility-administered medications for this visit.      Review of patient's allergies indicates:  No Known Allergies  Precautions: WB as tolerated      Visit # 11  Time In: 0950am  Time Out: 1045 am  Treatment time: 55  Date of eval/re-eval: 1/23/2017    Subjective:    Pt states: "my ankle feels a little better and stronger"  Pain: 0/10       Objective:            Pt received therapeutic exercises to develop strength, endurance, ROM and flexibility for 10 minutes including:     Elliptical: 6 minutes  Plantar fascia rolling with tennis ball x 3 minutes  4 way ankle: x 30 with OTB  - NP  Heel raises RLE 30 x - NP        [1] Neuromuscular re-ed x 45'  SLB multidirectional reach (Y balance): 3 x 10  SL Ball toss on foam: x 30   SL deadlift cone taps: 3 directions: x 20 each  Sport cord lateral bounding: 2 x 1" both directions with Blue cord  Sport cord lateral bounding with 90* rotations: 2 x 30" B with blue cord   Sport cord SL squat: 3 x 1" with Blue sport cord       Sport cord running movmements: 2 x 1' both directions with Blue cord - NP  Shuttle Jumps: 3 x 30 seconds - NP  Shuttle Runs: 3 x 30 seconds - NP          Written HEP Provided: yes  Patient education: for with new exercises for HEP  Flo mathews good understanding of the education provided. Patient demo good return demo of skill of exercises.    Problem List: muscle length impairments, decreased motor control and mobility, impaired ADLs, activity and participation restrictions.     Personal factors - back pain; multiple surgeries     CPT Code reference        Hx  Exam  Presentation   Code     Low     0   1-2    Stable    74618     Mod     1-2   3    Evolving    62021     High     3+   4+  " "   Unstable    90853       Assessment:        Flo tolerated therapy session well. Pt is demonstrating improving balance with his exercises. Pt instructed in STM using tennis ball for his plantar fascia   Flo will benefit from skilled PT services to address these impairments and progress towards the below listed functional goals.  Flo is in agreement with the goals that will be addressed in the treatment plan.Flo demonstrates no additional cultural, spiritual or educational needs and currently has no barriers to learning.      Medical necessity: see problem list -  indicates  / mod /  complexity based on clinical presentation that is  / evolving based on sxs in low back affecting rehab and contributingto LE impairments.       Functional Goals  Time frame     1.   The pt will perform SLB on RLE for > 30" indicated improved functional strength.   6 weeks     2.   The pt will demonstrate > 15 heel raises on RLE for improved functional strength.   6 weeks     3.   The pt will ambulate safely and [I] without sxs for > 2 hours without increase in sxs.   6 weeks     4.   The pt will be independent in performance and progression of HEP.     2-3 visits            Plan:      Proceed/Continue with POC.     Pt will be treated by physical therapy 1-2x/week during the certification period. Treatment will consist of therapeutic exercise, manual therapy, neuromuscular re-education, heat and cold modalities, electrical stimulation for pain relief and/or muscle activation, and any other types of treatment hat may be deemed medically necessary. Flo may at times be seen by a PTA as part of the Rehab Team.       Physical Therapist: Harriett Gregg, PTA     I certify the need for these services furnished under this plan of treatment and while under my care.       _      "

## 2017-05-05 ENCOUNTER — CLINICAL SUPPORT (OUTPATIENT)
Dept: REHABILITATION | Facility: HOSPITAL | Age: 22
End: 2017-05-05
Attending: ORTHOPAEDIC SURGERY
Payer: COMMERCIAL

## 2017-05-05 DIAGNOSIS — Z98.890 S/P ANKLE LIGAMENT REPAIR: ICD-10-CM

## 2017-05-05 PROCEDURE — 97110 THERAPEUTIC EXERCISES: CPT | Mod: PO | Performed by: PHYSICAL THERAPIST

## 2017-05-05 PROCEDURE — 97112 NEUROMUSCULAR REEDUCATION: CPT | Mod: PO | Performed by: PHYSICAL THERAPIST

## 2017-05-05 NOTE — PLAN OF CARE
"Name: Flo Mendez Jr.  Clinic Number: [id number]  05/05/2017.     Diagnosis: instability of R ankle   Physician: Gerald Ruiz MD  Treatment Orders: PT Eval and Treat    No past medical history on file.  No current outpatient prescriptions on file.     No current facility-administered medications for this visit.      Review of patient's allergies indicates:  No Known Allergies  Precautions: WB as tolerated      Visit # 12  Time In: 0903am  Time Out: 1000 am  Treatment time: 57  Date of eval/re-eval: 1/23/2017  Plan of care expiration: 6/16/17    Subjective:    Pt states: that he continues to have pain when walking, he does not have to be walking long before he has symptoms. He also reports that eversion is painful  Pain: 0/10       Objective:        SLB on R: 21 seconds  Heel raises on R: 23 completed        Pt received therapeutic exercises to develop strength, endurance, ROM and flexibility for 12 minutes including: reassessment    Elliptical: 6 minutes  Plantar fascia rolling with tennis ball x 3 minutes  Heel raises on R: 3 x 10      [1] Neuromuscular re-ed x 45'  Ballerinas on Blue therafoam disc: x 20  SLB multidirectional reach (Y balance) on Blue foam disc: 3 x 10  Forward Lunges onto Bosu: x 30  Side lunges onto Bosu;  X 30  Lateral shuffles on bosu: x 30 both directions  SL balance on dereje board: 3 x 20" each direction with board positioned for both DF/PF and INV/EV  SL Ball toss on foam: x 30   Shuttle Jumps: 3 x 30 jumps, 2 bands  Shuttle Runs: 3 x 30 jumps, 2 bands    Not performed:   SL deadlift cone taps: 3 directions: x 20 each  Sport cord lateral bounding: 2 x 1" both directions with Blue cord  Sport cord lateral bounding with 90* rotations: 2 x 30" B with blue cord   Sport cord SL squat: 3 x 1" with Blue sport cord   Sport cord running movmements: 2 x 1' both directions with Blue cord - NP            Written HEP Provided: yes  Patient education: for with new exercises for HEP  Flo" "demo good understanding of the education provided. Patient demo good return demo of skill of exercises.    Problem List: muscle length impairments, decreased motor control and mobility, impaired ADLs, activity and participation restrictions.     Personal factors - back pain; multiple surgeries     CPT Code reference        Hx  Exam  Presentation   Code     Low     0   1-2    Stable    38393     Mod     1-2   3    Evolving    61308     High     3+   4+     Unstable    23795       Assessment:        Flo tolerated therapy session well. Pt is demosntrating improving balance and strength. Met his goal for single leg calf raise strength. Continues to have complaints of pain with walking and feeling of instability. Counseled pt on importance of obtaining appropriate and supportive footwear, pt has yet to get a new pair of shoes but says he will do so soon. Will continue to work on dynamic balance and strength in PT to work towards pain free gait.   Flo will benefit from skilled PT services to address these impairments and progress towards the below listed functional goals.  Flo is in agreement with the goals that will be addressed in the treatment plan.Flo demonstrates no additional cultural, spiritual or educational needs and currently has no barriers to learning.      Medical necessity: see problem list -  indicates  / mod /  complexity based on clinical presentation that is  / evolving based on sxs in low back affecting rehab and contributingto LE impairments.       Functional Goals  Time frame      1.   The pt will perform SLB on RLE for > 30" indicated improved functional strength.   6 weeks  progressing     2.   The pt will demonstrate > 15 heel raises on RLE for improved functional strength.   6 weeks Met 5/5/17     3.   The pt will ambulate safely and [I] without sxs for > 2 hours without increase in sxs.   6 weeks Progressing     4.   The pt will be independent in performance and progression of HEP.     2-3 " visits Met             Plan:      Proceed/Continue with POC for 6 weeks.     Pt will be treated by physical therapy 1-3x/week during the certification period. Treatment will consist of therapeutic exercise, manual therapy, neuromuscular re-education, heat and cold modalities, electrical stimulation for pain relief and/or muscle activation, and any other types of treatment hat may be deemed medically necessary. Flo may at times be seen by a PTA as part of the Rehab Team.       Physical Therapist: Miguel Coto, PT     I certify the need for these services furnished under this plan of treatment and while under my care.       _

## 2017-05-08 ENCOUNTER — TELEPHONE (OUTPATIENT)
Dept: ORTHOPEDICS | Facility: CLINIC | Age: 22
End: 2017-05-08

## 2017-05-08 NOTE — TELEPHONE ENCOUNTER
----- Message from Karen Hall MA sent at 5/8/2017 10:55 AM CDT -----  Contact: self   Pt calling in regards to getting appt sooner then 06/15 for the Dr. Pt had surgery 12/13/16 and is having pain on his right ankle still.  Pt can be reached at [phone].

## 2017-05-08 NOTE — TELEPHONE ENCOUNTER
Spoke with pt.   States he is having some increased pain in his ankle.  Pt requested an appointment.  Pt scheduled with ARUN Van for evaluation and xrays.   Pt verbalized understanding.

## 2017-05-10 ENCOUNTER — OFFICE VISIT (OUTPATIENT)
Dept: ORTHOPEDICS | Facility: CLINIC | Age: 22
End: 2017-05-10
Payer: COMMERCIAL

## 2017-05-10 DIAGNOSIS — M25.571 RIGHT ANKLE PAIN, UNSPECIFIED CHRONICITY: ICD-10-CM

## 2017-05-10 DIAGNOSIS — R52 PAIN: Primary | ICD-10-CM

## 2017-05-10 PROCEDURE — 1160F RVW MEDS BY RX/DR IN RCRD: CPT | Mod: S$GLB,,, | Performed by: PHYSICIAN ASSISTANT

## 2017-05-10 PROCEDURE — 99999 PR PBB SHADOW E&M-EST. PATIENT-LVL I: CPT | Mod: PBBFAC,,, | Performed by: PHYSICIAN ASSISTANT

## 2017-05-10 PROCEDURE — 99213 OFFICE O/P EST LOW 20 MIN: CPT | Mod: S$GLB,,, | Performed by: PHYSICIAN ASSISTANT

## 2017-05-11 ENCOUNTER — HOSPITAL ENCOUNTER (OUTPATIENT)
Dept: RADIOLOGY | Facility: OTHER | Age: 22
Discharge: HOME | End: 2017-05-11
Attending: ORTHOPAEDIC SURGERY
Payer: COMMERCIAL

## 2017-05-11 DIAGNOSIS — R52 PAIN: ICD-10-CM

## 2017-05-11 PROCEDURE — 73721 MRI JNT OF LWR EXTRE W/O DYE: CPT | Mod: TC,RT

## 2017-05-11 PROCEDURE — 73721 MRI JNT OF LWR EXTRE W/O DYE: CPT | Mod: 26,RT,, | Performed by: RADIOLOGY

## 2017-05-12 NOTE — PROGRESS NOTES
Subjective:      Patient ID: Flo Mendez Jr. is a 22 y.o. male.    Chief Complaint: No chief complaint on file.    HPI    Patient is a 22 year old male who presents to clinic with chief complaint of lateral right ankle pain. Describes it as a sharp catching sensation. 6/10 but can get to 9/10 at times.   Stated that this first began a few nights ago.  Reports it is different form previous pains. Pain is only present with walking or range of motion of the ankle. Patient denied nay new injury. He is s/p right ankle anterolateral ligament reconstruction.  He is currently attending PT.  He is using an orthopedic insert in his shoes.  He is taking OTC NSAID without relief. Patient denied numbness or tingling, fever or chills.     Review of Systems   Constitution: Negative for chills and fever.   Cardiovascular: Negative for chest pain.   Respiratory: Negative for cough and shortness of breath.    Skin: Negative for color change, dry skin, itching, nail changes, poor wound healing and rash.   Musculoskeletal:        Right ankle pain   Neurological: Negative for dizziness.   Psychiatric/Behavioral: Negative for altered mental status. The patient is not nervous/anxious.    All other systems reviewed and are negative.        Objective:            General    Constitutional: He is oriented to person, place, and time. He appears well-developed and well-nourished. No distress.   HENT:   Head: Atraumatic.   Eyes: Conjunctivae are normal.   Cardiovascular: Normal rate.    Pulmonary/Chest: Effort normal.   Neurological: He is alert and oriented to person, place, and time.   Psychiatric: He has a normal mood and affect. His behavior is normal.         Right Ankle/Foot Exam     Inspection   Scars: present    Range of Motion   The patient has normal right ankle ROM.    Other   Sensation: normal    Comments:  Foot appears mildly pronated with standing.   Mild TTP in sinus tarsi area.         Vascular Exam     Right  Pulses  Dorsalis Pedis:      2+                Assessment:       Encounter Diagnoses   Name Primary?    Pain Yes    Right ankle pain, unspecified chronicity           Plan:         Discussed plan with patient. he is to rest ice elvate OTC NSAID and continue orthotic insert.   At this time I will order MRI of his ankle. Patient is to call for results

## 2017-05-19 ENCOUNTER — CLINICAL SUPPORT (OUTPATIENT)
Dept: REHABILITATION | Facility: HOSPITAL | Age: 22
End: 2017-05-19
Attending: ORTHOPAEDIC SURGERY
Payer: COMMERCIAL

## 2017-05-19 DIAGNOSIS — Z98.890 S/P ANKLE LIGAMENT REPAIR: ICD-10-CM

## 2017-05-19 PROCEDURE — 97035 APP MDLTY 1+ULTRASOUND EA 15: CPT | Mod: PO | Performed by: PHYSICAL THERAPIST

## 2017-05-19 PROCEDURE — 97110 THERAPEUTIC EXERCISES: CPT | Mod: PO | Performed by: PHYSICAL THERAPIST

## 2017-05-19 NOTE — PROGRESS NOTES
"Name: Flo Mendez Jr.  Clinic Number: [id number]  05/19/2017.     Diagnosis: instability of R ankle   Physician: Gerald Ruiz MD  Treatment Orders: PT Eval and Treat    No past medical history on file.  No current outpatient prescriptions on file.     No current facility-administered medications for this visit.      Review of patient's allergies indicates:  No Known Allergies  Precautions: WB as tolerated      Visit # 13  Time In: 1000am  Time Out: 1040 am  Treatment time: 40  Date of eval/re-eval: 1/23/2017  Plan of care expiration: 6/16/17    Subjective:    Pt states: that he continues to have pain when walking, he does not have to be walking long before he has symptoms. He also reports that eversion is painful  Pain: 0/10       Objective:          The patient received the following direct contact modalities after being cleared for contraindications: Ultrasound:  Flo received ultrasound to manage pain and inflammation at 20 % duty cycle applied to the R ankle at an intensity of  1 W/cm2  for a duration of 8 minutes. Patient tolerated treatment well without adverse effects. Therapist was in attendance throughout intervention.    Pt received therapeutic exercises to develop strength, endurance, ROM and flexibility for 25 minutes including:     Elliptical: 6 minutes  Inversion ROM: x 30  DF/PF ROM x 30  Eversion ROM x 30  Ankle circles: x 30            [1] Neuromuscular re-ed x 0 minutes  Ballerinas on Blue therafoam disc: x 20  SLB multidirectional reach (Y balance) on Blue foam disc: 3 x 10  Forward Lunges onto Bosu: x 30  Side lunges onto Bosu;  X 30  Lateral shuffles on bosu: x 30 both directions  SL balance on dereje board: 3 x 20" each direction with board positioned for both DF/PF and INV/EV  SL Ball toss on foam: x 30   Shuttle Jumps: 3 x 30 jumps, 2 bands  Shuttle Runs: 3 x 30 jumps, 2 bands    Not performed:   SL deadlift cone taps: 3 directions: x 20 each  Sport cord lateral bounding: 2 x 1" " "both directions with Blue cord  Sport cord lateral bounding with 90* rotations: 2 x 30" B with blue cord   Sport cord SL squat: 3 x 1" with Blue sport cord   Sport cord running movmements: 2 x 1' both directions with Blue cord - NP  Plantar fascia rolling with tennis ball x 3 minutes  Heel raises on R: 3 x 10          Written HEP Provided: yes  Patient education: for with new exercises for HEP  Flo demo good understanding of the education provided. Patient demo good return demo of skill of exercises.    Problem List: muscle length impairments, decreased motor control and mobility, impaired ADLs, activity and participation restrictions.     Personal factors - back pain; multiple surgeries     CPT Code reference        Hx  Exam  Presentation   Code     Low     0   1-2    Stable    36320     Mod     1-2   3    Evolving    57931     High     3+   4+     Unstable    57156       Assessment:        Flo tolerated therapy session fairly. Pt is having increased pain in his ankle, had MRI and is following up with his surgeon on Monday. Pt tolerated limited exercises today, had decreased pain post ultrasound. Pt continues to wear his old running shoes, says he is going later today to purchase new footwear.  Flo will benefit from skilled PT services to address these impairments and progress towards the below listed functional goals.  Flo is in agreement with the goals that will be addressed in the treatment plan.Flo demonstrates no additional cultural, spiritual or educational needs and currently has no barriers to learning.      Medical necessity: see problem list -  indicates  / mod /  complexity based on clinical presentation that is  / evolving based on sxs in low back affecting rehab and contributingto LE impairments.       Functional Goals  Time frame      1.   The pt will perform SLB on RLE for > 30" indicated improved functional strength.   6 weeks  progressing     2.   The pt will demonstrate > 15 heel raises on " RLE for improved functional strength.   6 weeks Met 5/5/17     3.   The pt will ambulate safely and [I] without sxs for > 2 hours without increase in sxs.   6 weeks Progressing     4.   The pt will be independent in performance and progression of HEP.     2-3 visits Met             Plan:      Continue with current POC.    Pt will be treated by physical therapy 1-3x/week during the certification period. Treatment will consist of therapeutic exercise, manual therapy, neuromuscular re-education, heat and cold modalities, electrical stimulation for pain relief and/or muscle activation, and any other types of treatment hat may be deemed medically necessary. Flo may at times be seen by a PTA as part of the Rehab Team.       Physical Therapist: Miguel Coto, PT     _

## 2017-05-22 ENCOUNTER — OFFICE VISIT (OUTPATIENT)
Dept: ORTHOPEDICS | Facility: CLINIC | Age: 22
End: 2017-05-22
Payer: COMMERCIAL

## 2017-05-22 VITALS — WEIGHT: 170 LBS | HEIGHT: 67 IN | BODY MASS INDEX: 26.68 KG/M2

## 2017-05-22 DIAGNOSIS — M25.571 RIGHT ANKLE PAIN, UNSPECIFIED CHRONICITY: Primary | ICD-10-CM

## 2017-05-22 DIAGNOSIS — Z09 S/P ORTHOPEDIC SURGERY, FOLLOW-UP EXAM: ICD-10-CM

## 2017-05-22 DIAGNOSIS — M25.371 INSTABILITY OF ANKLE JOINT, RIGHT: ICD-10-CM

## 2017-05-22 PROCEDURE — 99999 PR PBB SHADOW E&M-EST. PATIENT-LVL II: CPT | Mod: PBBFAC,,, | Performed by: ORTHOPAEDIC SURGERY

## 2017-05-22 PROCEDURE — 99213 OFFICE O/P EST LOW 20 MIN: CPT | Mod: S$GLB,,, | Performed by: ORTHOPAEDIC SURGERY

## 2017-05-22 PROCEDURE — 1160F RVW MEDS BY RX/DR IN RCRD: CPT | Mod: S$GLB,,, | Performed by: ORTHOPAEDIC SURGERY

## 2017-05-23 NOTE — PROGRESS NOTES
Mr. Mendez returns today.  It has been about five months since his right ankle   anterolateral ligament reconstruction.  He reports he was progressing until a   couple of weeks ago when he had some increased pain from his normal   postoperative pain.  He came in and saw Cheri Gilmroe a couple of weeks ago and an   MRI was obtained.  The MRI does not show any new findings other than the   post-surgical changes from the recent surgery.  He does not report any further   injuries, although he states he still feels somewhat weak and unstable.  He is   currently going to therapy and he feels the therapy is helping.  He states the   pain that arose recently is somewhat back to its baseline, but again he still   has continued pain with weightbearing.  Examination today reveals no significant   swelling.  Anterior drawer exam reveals good stability.  He also seems to have   good peroneus brevis strength without much pain.  He is tender over the surgical   site and the anterolateral ligament repair.  At this point, I think all we can   do is wait this out.  I would not recommend any further invasive treatment at   this point.  I assured him that he can do whatever low-impact activities he can   tolerate without causing any damage.  He is going to avoid high-impact   activities.  I would like to give this a full year to see what the final outcome   is.  I am going to have him make a return appointment in three months.      JACK/EDMAR  dd: 05/22/2017 13:38:45 (RUSS)  td: 05/23/2017 12:56:37 (RUSS)  Doc ID   #[id number]  Job ID #[id number]    CC:

## 2017-05-25 ENCOUNTER — CLINICAL SUPPORT (OUTPATIENT)
Dept: REHABILITATION | Facility: HOSPITAL | Age: 22
End: 2017-05-25
Attending: ORTHOPAEDIC SURGERY
Payer: COMMERCIAL

## 2017-05-25 DIAGNOSIS — Z98.890 S/P ANKLE LIGAMENT REPAIR: ICD-10-CM

## 2017-05-25 PROCEDURE — 97035 APP MDLTY 1+ULTRASOUND EA 15: CPT | Mod: PO | Performed by: PHYSICAL THERAPIST

## 2017-05-25 PROCEDURE — 97110 THERAPEUTIC EXERCISES: CPT | Mod: PO | Performed by: PHYSICAL THERAPIST

## 2017-05-25 NOTE — PROGRESS NOTES
"Name: Flo Mendez Jr.  Clinic Number: [id number]  05/25/2017.     Diagnosis: instability of R ankle   Physician: Gerald Ruiz MD  Treatment Orders: PT Eval and Treat    No past medical history on file.  No current outpatient prescriptions on file.     No current facility-administered medications for this visit.      Review of patient's allergies indicates:  No Known Allergies  Precautions: WB as tolerated      Visit # 14  Time In: 0805am  Time Out: 0900 am  Treatment time: 40  Date of eval/re-eval: 1/23/2017  Plan of care expiration: 6/16/17    Subjective:    Pt states: that his ankle is feeling better than it did the other day. He also got a new pair of shoes.   Pain: 0/10       Objective:          The patient received the following direct contact modalities after being cleared for contraindications: Ultrasound:  Flo received ultrasound to manage pain and inflammation at 20 % duty cycle applied to the R ankle at an intensity of  1 W/cm2  for a duration of 8 minutes. Patient tolerated treatment well without adverse effects. Therapist was in attendance throughout intervention.    Pt received therapeutic exercises to develop strength, endurance, ROM and flexibility for 45 minutes including:     Elliptical: 6 minutes  Inversion ROM: x 30  DF/PF ROM x 30  Eversion ROM x 30  Ankle circles: x 30  T band 4 way ankle x 30 each with GTB  Great toe lifts: x 30 (Holding other 4 toes to keep them from lifting)  Smaller toe lifts: x 30 (holding great toe down)        [1] Neuromuscular re-ed x 0 minutes  Ballerinas on Blue therafoam disc: x 20  SLB multidirectional reach (Y balance) on Blue foam disc: 3 x 10  Forward Lunges onto Bosu: x 30  Side lunges onto Bosu;  X 30  Lateral shuffles on bosu: x 30 both directions  SL balance on dereje board: 3 x 20" each direction with board positioned for both DF/PF and INV/EV  SL Ball toss on foam: x 30   Shuttle Jumps: 3 x 30 jumps, 2 bands  Shuttle Runs: 3 x 30 jumps, 2 " "bands    Not performed:   SL deadlift cone taps: 3 directions: x 20 each  Sport cord lateral bounding: 2 x 1" both directions with Blue cord  Sport cord lateral bounding with 90* rotations: 2 x 30" B with blue cord   Sport cord SL squat: 3 x 1" with Blue sport cord   Sport cord running movmements: 2 x 1' both directions with Blue cord - NP  Plantar fascia rolling with tennis ball x 3 minutes  Heel raises on R: 3 x 10          Written HEP Provided:see pt instructions  Patient education: for with new exercises for HEP  Flo demo good understanding of the education provided. Patient demo good return demo of skill of exercises.    Problem List: muscle length impairments, decreased motor control and mobility, impaired ADLs, activity and participation restrictions.     Personal factors - back pain; multiple surgeries     CPT Code reference        Hx  Exam  Presentation   Code     Low     0   1-2    Stable    34573     Mod     1-2   3    Evolving    30464     High     3+   4+     Unstable    25062       Assessment:        Flo tolerated therapy session fairly. Tolerated increase in exercise today without pain. New HEP provided, see pt instructions. Will return to full therapy next week.  Flo will benefit from skilled PT services to address these impairments and progress towards the below listed functional goals.  Flo is in agreement with the goals that will be addressed in the treatment plan.Flo demonstrates no additional cultural, spiritual or educational needs and currently has no barriers to learning.      Medical necessity: see problem list -  indicates  / mod /  complexity based on clinical presentation that is  / evolving based on sxs in low back affecting rehab and contributingto LE impairments.       Functional Goals  Time frame      1.   The pt will perform SLB on RLE for > 30" indicated improved functional strength.   6 weeks  progressing     2.   The pt will demonstrate > 15 heel raises on RLE for improved " functional strength.   6 weeks Met 5/5/17     3.   The pt will ambulate safely and [I] without sxs for > 2 hours without increase in sxs.   6 weeks Progressing     4.   The pt will be independent in performance and progression of HEP.     2-3 visits Met             Plan:      Continue with current POC.    Pt will be treated by physical therapy 1-3x/week during the certification period. Treatment will consist of therapeutic exercise, manual therapy, neuromuscular re-education, heat and cold modalities, electrical stimulation for pain relief and/or muscle activation, and any other types of treatment hat may be deemed medically necessary. Flo may at times be seen by a PTA as part of the Rehab Team.       Physical Therapist: Miguel Coto, PT     _

## 2017-05-25 NOTE — PATIENT INSTRUCTIONS
Ankle Alphabet    Sit with leg straight out in front of you and heel off edge of bed or propped up on towel roll. Using ankle and foot only, trace the letters of the alphabet. Perform A to Z. Do not let the knee move too much side to side.  Do 1 sets per session. Do 3 sessions per day.      Towel Scrunches    Place right foot flat on towel, knee pointed forward. Use forefoot and toes to pull towel backward.  Do not allow heel or knee to move. Repetitions should be slow and controlled.  Repeat 2 minutes right leg. Do 1 sessions per day.        Gastroc, Sitting (Passive)    Sit with leg straight out in front of you and a towel under your heel and around ball of foot. Gently pull toward body. Hold 30 seconds.   Repeat 3 times per session right leg. Do 1 sessions per day.      Toe Lift    Sit with your foot flat on the floor and your finger under your big toe. Without rolling in/out or lifting your heel/toes, push down into your finger with your big toe joint. Maintain the pressure of the ball of your foot on your finger, keep the four little toes relaxed and in contact with the ground, then slowly lift the big toe up and down again. Do slowly and take breaks as needed.  Do 10 times per set right leg. Do 1 sessions per day.      Resistance Band Ankle Movements, 4 ways    1. Wrap band around foot and attach below the foot. Pull foot up against resistance band. Slowly release for 3-5 seconds.  2. Wrap band around foot and hold band in your hand. Push foot down against resistance band. Slowly release for 3-5 seconds.  3. Wrap band around foot and loop around other foot and hold the end of the band. Pull foot out to side against resistance band. Slowly release for 3-5 seconds.   4. Cross one leg over the other, wrap band around bottom foot, step top of foot on band and hold the end of the band. Pull foot to the inside against resistance band. Slowly release for 3-5 seconds.     Use Green resistance band.  Repeat 15  times per set right leg. Do 2 sets per session. Do 1 sessions per day.      Tandem Stand    Stand with left foot in front of the other. Hold 30 seconds. Repeat with other leg forward.  Do 3 repetitions, 1 sets per day.      Single Leg - Eyes Open    Holding support, lift left leg while maintaining balance on other leg.  Use counter for support only as needed. Progress by closing eyes or standing on cushion. Hold 30 seconds. Repeat on other side.  Repeat 3 times per session. Do 1 sessions per day.      Calf Raise: Bilateral (Standing)    Stand on both feet and rise on balls of feet. Do not let ankles roll out. Slowly return to start and repeat.  Repeat 20 times per set. Do 3 sets per session. Do 1 sessions per day.      Calf Raises: 2 Up, 1 Down    Raise both heels up, lift one leg off ground, slowly lower the other heel over 5-6 seconds. Hold onto a counter as needed.  Repeat 15 times per set each leg. Do 2 sets per session. Do 1 sessions per day.    Copyright © I. All rights reserved.       Resisted Lateral Walking    Place a band around your ankles. Step to the side with one foot then bring the other foot in, keeping slight tension on the bend. Keep knees bent and toes pointed straight forward. Return to the other side.  Do 20 feet. Repeat 2 times a day.      Resisted Monster Walking    Start with feet shoulder width apart on a diagonal with Green band around knee. Move back foot toward front foot then forward and out again. Repeat with other leg, walking in a zig zag motion. Do not step both feet down when right next to each other. Return by repeating backwards.  Do 20 feet. Repeat 2 times. Do 1 sessions a day.      Medial Step-Down     Stand with both feet on 6 inch step. Step down to the side with right foot facing forward, lightly touching heel to the floor and return. Maintain all body weight on the step the entire time.  Repeat 15 times per set. Do 2 sets per session. Do 1 sessions per day.      Single Leg  Balance [place] Touches    Stand on right leg. Bend knee and reach opposite leg forward, lightly tapping heel on ground. Straighten knee and return to middle. Bend knee and reach foot to outside, lightly tapping. Return to middle then bend and reach backwards, tapping toes behind you. This is one rep.  Do 15 reps per set. Do 2 sets per day.      Single Leg Dead Lift    Holding weights, stand on affected leg with knee slightly flexed. Lift other leg while slowly bending forward at the hip to keep torso in line with swinging leg.  Repeat 15 times per set. Do 2 sets per session. Do 1 sessions per day.      Reverse Lunges with High Knee    Step one leg backward. Drop back knee gently towards the floor. Front shin stays vertical. Push forward with ball of foot and hips, keeping head and chest up, bringing back knee up and forward. Repeat. For one set, do reps all one leg then other.   Do 15 reps per set. Do 2 sets per day.      90-90 Hops    Stand on right foot. Hop up while rotating 90 degrees to the right, landing on the same foot. Repeat until facing the original direction then return by hopping and rotating to the left. Repeat on other foot.  Repeat 10 times per set. Do 2 sets per session. Do 1 sessions per day.    Copyright © I. All rights reserved.

## 2017-06-02 ENCOUNTER — CLINICAL SUPPORT (OUTPATIENT)
Dept: REHABILITATION | Facility: HOSPITAL | Age: 22
End: 2017-06-02
Attending: ORTHOPAEDIC SURGERY
Payer: COMMERCIAL

## 2017-06-02 DIAGNOSIS — Z98.890 S/P ANKLE LIGAMENT REPAIR: ICD-10-CM

## 2017-06-02 PROCEDURE — 97112 NEUROMUSCULAR REEDUCATION: CPT | Mod: PO | Performed by: PHYSICAL THERAPIST

## 2017-06-02 PROCEDURE — 97110 THERAPEUTIC EXERCISES: CPT | Mod: PO | Performed by: PHYSICAL THERAPIST

## 2017-06-02 NOTE — PROGRESS NOTES
"Name: Flo Mendez Jr.  Clinic Number: [id number]  06/02/2017.     Diagnosis: instability of R ankle   Physician: Gerald Ruiz MD  Treatment Orders: PT Eval and Treat    No past medical history on file.  No current outpatient prescriptions on file.     No current facility-administered medications for this visit.      Review of patient's allergies indicates:  No Known Allergies  Precautions: WB as tolerated      Visit # 15  Time In: 1005am  Time Out: 1100 am  Treatment time: 55  Date of eval/re-eval: 1/23/2017  Plan of care expiration: 6/16/17    Subjective:    Pt states: that he is feeling about the same   Pain: 0/10       Objective:        Pt received therapeutic exercises to develop strength, endurance, ROM and flexibility for 15 minutes including:     Elliptical: 6 minutes  Great toe lifts: x 30 (Holding other 4 toes to keep them from lifting)  Smaller toe lifts: x 30 (holding great toe down)  Towel Scrunches: x 3 minutes  Heel raises on R: 3 x 10          [1] Neuromuscular re-ed x 25 minutes  Ballerinas on Blue therafoam disc: x 20  Forward Lunges onto Bosu: x 30  Side lunges onto Bosu;  X 30  Lateral shuffles on bosu: x 30 both directions  SL Ball toss standing on level ground: x 30 each direction    Pt received ice to R ankle for 10 minutes following therapy        Not performed NR:   SLB multidirectional reach (Y balance) on Blue foam disc: 3 x 10  SL balance on dereje board: 3 x 20" each direction with board positioned for both DF/PF and INV/EV  Shuttle Jumps: 3 x 30 jumps, 2 bands  Shuttle Runs: 3 x 30 jumps, 2 bands    Not performed TX:   SL deadlift cone taps: 3 directions: x 20 each  Sport cord lateral bounding: 2 x 1" both directions with Blue cord  Sport cord lateral bounding with 90* rotations: 2 x 30" B with blue cord   Sport cord SL squat: 3 x 1" with Blue sport cord   Sport cord running movmements: 2 x 1' both directions with Blue cord - NP  Plantar fascia rolling with tennis ball x 3 " "minutes            Written HEP Provided:see pt instructions  Patient education: for with new exercises for HEP  Flo demo good understanding of the education provided. Patient demo good return demo of skill of exercises.    Problem List: muscle length impairments, decreased motor control and mobility, impaired ADLs, activity and participation restrictions.     Personal factors - back pain; multiple surgeries     CPT Code reference        Hx  Exam  Presentation   Code     Low     0   1-2    Stable    71726     Mod     1-2   3    Evolving    54678     High     3+   4+     Unstable    34506       Assessment:        Flo tolerated therapy session fairly. Pt able to increased exercises today and balance activities with some soreness following therapy. Pt had decreased discomfort following ice.  Flo will benefit from skilled PT services to address these impairments and progress towards the below listed functional goals.  Flo is in agreement with the goals that will be addressed in the treatment plan.Flo demonstrates no additional cultural, spiritual or educational needs and currently has no barriers to learning.      Medical necessity: see problem list -  indicates  / mod /  complexity based on clinical presentation that is  / evolving based on sxs in low back affecting rehab and contributingto LE impairments.       Functional Goals  Time frame      1.   The pt will perform SLB on RLE for > 30" indicated improved functional strength.   6 weeks  progressing     2.   The pt will demonstrate > 15 heel raises on RLE for improved functional strength.   6 weeks Met 5/5/17     3.   The pt will ambulate safely and [I] without sxs for > 2 hours without increase in sxs.   6 weeks Progressing     4.   The pt will be independent in performance and progression of HEP.     2-3 visits Met             Plan:      Continue with current POC.        Physical Therapist: Miguel Coto, PT     _    "

## 2017-06-07 ENCOUNTER — CLINICAL SUPPORT (OUTPATIENT)
Dept: REHABILITATION | Facility: HOSPITAL | Age: 22
End: 2017-06-07
Attending: ORTHOPAEDIC SURGERY
Payer: COMMERCIAL

## 2017-06-07 DIAGNOSIS — M25.60 RANGE OF MOTION DEFICIT: ICD-10-CM

## 2017-06-07 DIAGNOSIS — M25.551 PAIN OF RIGHT HIP JOINT: ICD-10-CM

## 2017-06-07 DIAGNOSIS — R26.9 ABNORMALITY OF GAIT: ICD-10-CM

## 2017-06-07 DIAGNOSIS — Z98.890 S/P ANKLE LIGAMENT REPAIR: Primary | ICD-10-CM

## 2017-06-07 PROCEDURE — 97110 THERAPEUTIC EXERCISES: CPT | Mod: PO

## 2017-06-07 NOTE — PROGRESS NOTES
"Name: Flo Mendez Jr.  Clinic Number: [id number]  06/07/2017.     Diagnosis: instability of R ankle   Physician: Gerald Ruiz MD  Treatment Orders: PT Eval and Treat    No past medical history on file.  No current outpatient prescriptions on file.     No current facility-administered medications for this visit.      Review of patient's allergies indicates:  No Known Allergies  Precautions: WB as tolerated      Visit # 15  Time In: 09:00 am  Time Out: 10:00  am  Treatment time: 60  Date of eval/re-eval: 1/23/2017  Plan of care expiration: 6/16/17    Subjective:    Pt states: that he is feeling about the same   Pain: 0/10       Objective:        Pt received therapeutic exercises to develop strength, endurance, ROM and flexibility for 25 minutes including:     Elliptical: 6 minutes  Great toe lifts: x 30 (Holding other 4 toes to keep them from lifting)  Smaller toe lifts: x 30 (holding great toe down)  Towel Scrunches: x 3 minutes  Heel raises on R: 3 x 10  SL Ball toss standing on level ground: x 30 each direction  Overhead carries with march:  Length of mirror x 3 #15 lb  Single leg shuttle on ruben disc: 3 x 10 #2.5 bands           [1] Neuromuscular re-ed x 35 minutes  Ballerinas on Blue therafoam disc: x 20  Forward Lunges onto Bosu: x 30  Side lunges onto Bosu;  X 30  Step up with march on bosu: x 10 with walking stick for support   Tramp trot: 2 x 1 minute   Lateral shuffles on bosu: x 30 both directions  SL Ball toss standing on level ground: x 30 each direction    Pt received ice to R ankle for 10 minutes following therapy        Not performed NR:   SLB multidirectional reach (Y balance) on Blue foam disc: 3 x 10  SL balance on dereje board: 3 x 20" each direction with board positioned for both DF/PF and INV/EV  Shuttle Jumps: 3 x 30 jumps, 2 bands  Shuttle Runs: 3 x 30 jumps, 2 bands    Not performed TX:   SL deadlift cone taps: 3 directions: x 20 each  Sport cord lateral bounding: 2 x 1" both " "directions with Blue cord  Sport cord lateral bounding with 90* rotations: 2 x 30" B with blue cord   Sport cord SL squat: 3 x 1" with Blue sport cord   Sport cord running movmements: 2 x 1' both directions with Blue cord - NP  Plantar fascia rolling with tennis ball x 3 minutes            Written HEP Provided:see pt instructions  Patient education: for with new exercises for HEP  Flo demo good understanding of the education provided. Patient demo good return demo of skill of exercises.    Problem List: muscle length impairments, decreased motor control and mobility, impaired ADLs, activity and participation restrictions.     Personal factors - back pain; multiple surgeries     CPT Code reference        Hx  Exam  Presentation   Code     Low     0   1-2    Stable    30776     Mod     1-2   3    Evolving    07903     High     3+   4+     Unstable    53784       Assessment:        Flo tolerated therapy session fairly. Tolerated exercise progression fairly well. Remains with decreased higher level dynamic balance and proprioception.  Pt had decreased discomfort following ice.  Flo will benefit from skilled PT services to address these impairments and progress towards the below listed functional goals.  Flo is in agreement with the goals that will be addressed in the treatment plan.Flo demonstrates no additional cultural, spiritual or educational needs and currently has no barriers to learning.      Medical necessity: see problem list -  indicates  / mod /  complexity based on clinical presentation that is  / evolving based on sxs in low back affecting rehab and contributingto LE impairments.       Functional Goals  Time frame      1.   The pt will perform SLB on RLE for > 30" indicated improved functional strength.   6 weeks  progressing     2.   The pt will demonstrate > 15 heel raises on RLE for improved functional strength.   6 weeks Met 5/5/17     3.   The pt will ambulate safely and [I] without sxs for > 2 " hours without increase in sxs.   6 weeks Progressing     4.   The pt will be independent in performance and progression of HEP.     2-3 visits Met             Plan:      Continue with current POC.        Physical Therapist: Harriett Gregg PTA     _

## 2017-06-09 ENCOUNTER — CLINICAL SUPPORT (OUTPATIENT)
Dept: REHABILITATION | Facility: HOSPITAL | Age: 22
End: 2017-06-09
Attending: ORTHOPAEDIC SURGERY
Payer: COMMERCIAL

## 2017-06-09 DIAGNOSIS — M25.551 PAIN OF RIGHT HIP JOINT: ICD-10-CM

## 2017-06-09 DIAGNOSIS — M25.60 RANGE OF MOTION DEFICIT: ICD-10-CM

## 2017-06-09 DIAGNOSIS — Z98.890 S/P ANKLE LIGAMENT REPAIR: Primary | ICD-10-CM

## 2017-06-09 DIAGNOSIS — R26.9 ABNORMALITY OF GAIT: ICD-10-CM

## 2017-06-09 PROCEDURE — 97110 THERAPEUTIC EXERCISES: CPT | Mod: PO

## 2017-06-09 NOTE — PROGRESS NOTES
"Name: Flo Mendez Jr.  Clinic Number: [id number]  06/09/2017.     Diagnosis: instability of R ankle   Physician: Gerald Ruiz MD  Treatment Orders: PT Eval and Treat    No past medical history on file.  No current outpatient prescriptions on file.     No current facility-administered medications for this visit.      Review of patient's allergies indicates:  No Known Allergies  Precautions: WB as tolerated      Visit # 17  Time In: 09:00 am  Time Out: 10:00  am  Treatment time: 60 minutes   Date of eval/re-eval: 1/23/2017  Plan of care expiration: 6/16/17    Subjective:    Pt states: that he is feeling about the same , increased soreness with prolonged walking   Pain: 1 / 10       Objective:        Pt received therapeutic exercises to develop strength, endurance, ROM and flexibility for 25 minutes including:     Elliptical: 6 minutes  Great toe lifts: x 30 (Holding other 4 toes to keep them from lifting)  Smaller toe lifts: x 30 (holding great toe down)  Towel Scrunches: x 3 minutes  Heel raises on R: 3 x 10  SL Ball toss standing on level ground: x 30 each direction  Overhead carries with march:  Length of mirror x 3 #15 lb  Single leg shuttle on ruben disc: 3 x 10 #2.5 bands           [1] Neuromuscular re-ed x 35 minutes  Ballerinas on Blue therafoam disc: x 20  Forward Lunges onto Bosu: x 30  Side lunges onto Bosu;  X 30  Step up with march on bosu: x 10 with walking stick for support   Tramp trot: 2 x 1 minute   Lateral shuffles on bosu: x 30 both directions  SL Ball toss standing on level ground: x 30 each [id number]    Pt received ice to R ankle for 10 minutes following therapy        Not performed NR:   SLB multidirectional reach (Y balance) on Blue foam disc: 3 x 10  SL balance on dereje board: 3 x 20" each direction with board positioned for both DF/PF and INV/EV  Shuttle Jumps: 3 x 30 jumps, 2 bands  Shuttle Runs: 3 x 30 jumps, 2 bands    Not performed TX:   SL deadlift cone taps: 3 " "directions: x 20 each  Sport cord lateral bounding: 2 x 1" both directions with Blue cord  Sport cord lateral bounding with 90* rotations: 2 x 30" B with blue cord   Sport cord SL squat: 3 x 1" with Blue sport cord   Sport cord running movmements: 2 x 1' both directions with Blue cord - NP  Plantar fascia rolling with tennis ball x 3 minutes            Written HEP Provided:see pt instructions  Patient education: for with new exercises for HEP  Flo demo good understanding of the education provided. Patient demo good return demo of skill of exercises.    Problem List: muscle length impairments, decreased motor control and mobility, impaired ADLs, activity and participation restrictions.     Personal factors - back pain; multiple surgeries     CPT Code reference        Hx  Exam  Presentation   Code     Low     0   1-2    Stable    61218     Mod     1-2   3    Evolving    91656     High     3+   4+     Unstable    91489       Assessment:        Flo tolerated therapy session fairly. Tolerated exercise progression fairly well. Remains with decreased higher level dynamic balance and proprioception.  Demos improving balance. Pt had decreased discomfort following ice.  Flo will benefit from skilled PT services to address these impairments and progress towards the below listed functional goals.  Flo is in agreement with the goals that will be addressed in the treatment plan.Flo demonstrates no additional cultural, spiritual or educational needs and currently has no barriers to learning.      Medical necessity: see problem list -  indicates  / mod /  complexity based on clinical presentation that is  / evolving based on sxs in low back affecting rehab and contributingto LE impairments.       Functional Goals  Time frame      1.   The pt will perform SLB on RLE for > 30" indicated improved functional strength.   6 weeks  progressing     2.   The pt will demonstrate > 15 heel raises on RLE for improved functional strength.  "  6 weeks Met 5/5/17     3.   The pt will ambulate safely and [I] without sxs for > 2 hours without increase in sxs.   6 weeks Progressing     4.   The pt will be independent in performance and progression of HEP.     2-3 visits Met             Plan:      Continue with current POC.        Physical Therapist: Harriett Gregg, PTA     _  ;

## 2017-06-14 ENCOUNTER — CLINICAL SUPPORT (OUTPATIENT)
Dept: REHABILITATION | Facility: HOSPITAL | Age: 22
End: 2017-06-14
Attending: ORTHOPAEDIC SURGERY
Payer: COMMERCIAL

## 2017-06-14 DIAGNOSIS — M25.60 RANGE OF MOTION DEFICIT: ICD-10-CM

## 2017-06-14 DIAGNOSIS — R26.9 ABNORMALITY OF GAIT: ICD-10-CM

## 2017-06-14 DIAGNOSIS — M25.551 PAIN OF RIGHT HIP JOINT: ICD-10-CM

## 2017-06-14 DIAGNOSIS — Z98.890 S/P ANKLE LIGAMENT REPAIR: Primary | ICD-10-CM

## 2017-06-14 PROCEDURE — 97112 NEUROMUSCULAR REEDUCATION: CPT | Mod: PO

## 2017-06-14 PROCEDURE — 97110 THERAPEUTIC EXERCISES: CPT | Mod: PO

## 2017-06-14 NOTE — PROGRESS NOTES
"Name: Flo Mendez Jr.  Clinic Number: [id number]  06/14/2017.     Diagnosis: instability of R ankle   Physician: Gerald Ruiz MD  Treatment Orders: PT Eval and Treat    No past medical history on file.  No current outpatient prescriptions on file.     No current facility-administered medications for this visit.      Review of patient's allergies indicates:  No Known Allergies  Precautions: WB as tolerated      Visit # 18  Time In: 09:00 am  Time Out: 10:00  am  Treatment time: 60 minutes   Date of eval/re-eval: 1/23/2017  Plan of care expiration: 6/16/17    Subjective:      Patient states " it hurts today". "Sometime it hurts so much I can't walk"  Pain: 4 / 10       Objective:        Pt received therapeutic exercises to develop strength, endurance, ROM and flexibility for 25 minutes including:     Elliptical: 6 minutes  Great toe lifts: x 30 (Holding other 4 toes to keep them from lifting)  Smaller toe lifts: x 30 (holding great toe down)  Towel Scrunches: x 3 minutes with #4lbs   Heel raises on R: 3 x 10  Single leg shuttle on ruben disc: 3 x 10 #2.5 bands       [1] Neuromuscular re-ed x 35 minutes  Ballerinas on Blue therafoam disc: x 20  Forward Lunges onto Bosu: x 30  Side lunges onto Bosu;  X 30  Step up with march on bosu: x 10 with walking stick for support   Tramp trot: 2 x 1 minute   Lateral shuffles on bosu: x 30 both directions  SL Ball toss standing on level ground: x 30 each direction  Shuttle Jumps: 3 x 30 jumps, 2 bands  Shuttle Runs: 3 x 30 jumps, 2 bands      Pt received ice to R ankle for 10 minutes following therapy        Not performed NR:   SLB multidirectional reach (Y balance) on Blue foam disc: 3 x 10  SL balance on dreeje board: 3 x 20" each direction with board positioned for both DF/PF and INV/EV  Overhead carries with march:  Length of mirror x 3 #15     Not performed TX:   SL deadlift cone taps: 3 directions: x 20 each  Sport cord lateral bounding: 2 x 1" both directions " "with Blue cord  Sport cord lateral bounding with 90* rotations: 2 x 30" B with blue cord   Sport cord SL squat: 3 x 1" with Blue sport cord   Sport cord running movmements: 2 x 1' both directions with Blue cord - NP  Plantar fascia rolling with tennis ball x 3 minutes            Written HEP Provided:see pt instructions  Patient education: for with new exercises for HEP  Flo demo good understanding of the education provided. Patient demo good return demo of skill of exercises.    Problem List: muscle length impairments, decreased motor control and mobility, impaired ADLs, activity and participation restrictions.     Personal factors - back pain; multiple surgeries     CPT Code reference        Hx  Exam  Presentation   Code     Low     0   1-2    Stable    75501     Mod     1-2   3    Evolving    95713     High     3+   4+     Unstable    22130       Assessment:        Flo tolerated therapy session fairly. Continues to have subjective complaints of pain with dorsiflexion.  Flo will benefit from skilled PT services to address these impairments and progress towards the below listed functional goals.  Flo is in agreement with the goals that will be addressed in the treatment plan.Flo demonstrates no additional cultural, spiritual or educational needs and currently has no barriers to learning.      Medical necessity: see problem list -  indicates  / mod /  complexity based on clinical presentation that is  / evolving based on sxs in low back affecting rehab and contributingto LE impairments.       Functional Goals  Time frame      1.   The pt will perform SLB on RLE for > 30" indicated improved functional strength.   6 weeks  progressing     2.   The pt will demonstrate > 15 heel raises on RLE for improved functional strength.   6 weeks Met 5/5/17     3.   The pt will ambulate safely and [I] without sxs for > 2 hours without increase in sxs.   6 weeks Progressing     4.   The pt will be independent in performance and " progression of HEP.     2-3 visits Met             Plan:      Continue with current POC.        Physical Therapist: Harriett Gregg, PTA     _  ;

## 2017-06-21 ENCOUNTER — CLINICAL SUPPORT (OUTPATIENT)
Dept: REHABILITATION | Facility: HOSPITAL | Age: 22
End: 2017-06-21
Attending: ORTHOPAEDIC SURGERY
Payer: COMMERCIAL

## 2017-06-21 DIAGNOSIS — Z98.890 S/P ANKLE LIGAMENT REPAIR: ICD-10-CM

## 2017-06-21 PROCEDURE — 97110 THERAPEUTIC EXERCISES: CPT | Mod: PO | Performed by: PHYSICAL THERAPIST

## 2017-09-07 ENCOUNTER — OFFICE VISIT (OUTPATIENT)
Dept: ORTHOPEDICS | Facility: CLINIC | Age: 22
End: 2017-09-07
Payer: COMMERCIAL

## 2017-09-07 VITALS — WEIGHT: 196.88 LBS | HEIGHT: 67 IN | BODY MASS INDEX: 30.9 KG/M2

## 2017-09-07 DIAGNOSIS — Z09 S/P ORTHOPEDIC SURGERY, FOLLOW-UP EXAM: ICD-10-CM

## 2017-09-07 DIAGNOSIS — M25.571 RIGHT ANKLE PAIN, UNSPECIFIED CHRONICITY: Primary | ICD-10-CM

## 2017-09-07 PROCEDURE — 99999 PR PBB SHADOW E&M-EST. PATIENT-LVL II: CPT | Mod: PBBFAC,,, | Performed by: ORTHOPAEDIC SURGERY

## 2017-09-07 PROCEDURE — 99213 OFFICE O/P EST LOW 20 MIN: CPT | Mod: S$GLB,,, | Performed by: ORTHOPAEDIC SURGERY

## 2017-09-07 PROCEDURE — 3008F BODY MASS INDEX DOCD: CPT | Mod: S$GLB,,, | Performed by: ORTHOPAEDIC SURGERY

## 2017-09-08 NOTE — PROGRESS NOTES
Mr. Mendez returns today.  He is now about nine months out from his right ankle   anterolateral ligament reconstruction with augmentation using the [employer]   FiberTape internal brace.  He has had some continued pain at the surgical site   since I saw him four months ago.  He states it maybe about 10% better.  He also   feels he has some continued gait abnormalities, but does not report any further   injury.  He saw an outside orthopedist for a second opinion and it was suggested   that he use an ankle brace.  On examination, there is no significant swelling.    He is tender still over the surgical site.  He has good motion of his ankle and   subtalar joint with no significant discomfort.  He has decent strength of his   peroneus brevis tendon.  Anterior drawer exam does not suggest any significant   instability.  He had an MRI done in May, which did not show any obvious new   abnormalities.  I suspect he may have some discomfort due to the internal brace   and I told him we could consider removing this at some point, but I would be   concerned about creating another instability problem and I would like to wait at   least a year out from his surgery.  I told him he can go ahead and progress his   activities.  I also discussed with him the possibility of doing a tendinous   reconstruction if necessary, but his instability seems to be good at this point.    He is going to see how he does over the next several months.  I am going to   have him return to see me at the beginning of next year.      JACK/EDMAR  dd: 09/07/2017 09:42:06 (RUSS)  td: 09/07/2017 21:03:00 (RUSS)  Doc ID   #[id number]  Job ID #[id number]    CC:

## 2018-11-26 ENCOUNTER — OFFICE VISIT (OUTPATIENT)
Dept: SPORTS MEDICINE | Facility: CLINIC | Age: 23
End: 2018-11-26
Payer: COMMERCIAL

## 2018-11-26 ENCOUNTER — HOSPITAL ENCOUNTER (OUTPATIENT)
Dept: RADIOLOGY | Facility: HOSPITAL | Age: 23
Discharge: HOME | End: 2018-11-26
Attending: ORTHOPAEDIC SURGERY
Payer: COMMERCIAL

## 2018-11-26 VITALS
SYSTOLIC BLOOD PRESSURE: 138 MMHG | BODY MASS INDEX: 25.76 KG/M2 | HEIGHT: 68 IN | HEART RATE: 68 BPM | WEIGHT: 170 LBS | DIASTOLIC BLOOD PRESSURE: 69 MMHG

## 2018-11-26 DIAGNOSIS — M25.562 ACUTE PAIN OF LEFT KNEE: ICD-10-CM

## 2018-11-26 DIAGNOSIS — S83.512D RUPTURE OF ANTERIOR CRUCIATE LIGAMENT OF LEFT KNEE, SUBSEQUENT ENCOUNTER: Primary | ICD-10-CM

## 2018-11-26 PROCEDURE — 99214 OFFICE O/P EST MOD 30 MIN: CPT | Mod: 25,S$GLB,, | Performed by: ORTHOPAEDIC SURGERY

## 2018-11-26 PROCEDURE — 3008F BODY MASS INDEX DOCD: CPT | Mod: CPTII,S$GLB,, | Performed by: ORTHOPAEDIC SURGERY

## 2018-11-26 PROCEDURE — 99999 PR PBB SHADOW E&M-EST. PATIENT-LVL III: CPT | Mod: PBBFAC,,, | Performed by: ORTHOPAEDIC SURGERY

## 2018-11-26 PROCEDURE — 73564 X-RAY EXAM KNEE 4 OR MORE: CPT | Mod: TC,50,FY,PO

## 2018-11-26 PROCEDURE — 20610 DRAIN/INJ JOINT/BURSA W/O US: CPT | Mod: LT,S$GLB,, | Performed by: ORTHOPAEDIC SURGERY

## 2018-11-26 PROCEDURE — 73564 X-RAY EXAM KNEE 4 OR MORE: CPT | Mod: 26,50,, | Performed by: RADIOLOGY

## 2018-11-26 NOTE — PROCEDURES
Large Joint Aspiration/Injection: L knee  Date/Time: 11/26/2018 2:05 PM  Performed by: Susan Nguyen MD  Authorized by: Susan Nguyen MD     Consent Done?:  Yes (Verbal)  Indications:  Pain  Procedure site marked: Yes    Timeout: Prior to procedure the correct patient, procedure, and site was verified      Location:  Knee  Site:  L knee  Prep: Patient was prepped and draped in usual sterile fashion    Needle size:  18 G  Approach:  Lateral  Aspirate amount (ml):  40  Aspirate:  Bloody  Patient tolerance:  Patient tolerated the procedure well with no immediate complications

## 2018-11-26 NOTE — PROGRESS NOTES
CC: Left knee pain with instability, patient is a      23 y.o. Male reports knee pain with instability after recent injury.    He notes on 11/23/18 he re-injured his right knee  He notes stepping down from a step wrong/awkward and his knee gave out    He was not able to put weight on his leg, he has been using crutches non-weight bearing     He notes that 6 weeks ago he had a similar incident that was not as bad with instability and stairs     Is affecting ADLs.     no mechanical symptoms, + instability  He describes his pain as medial   He notes swelling since the injury  He has pain with weight bearing, flexion and extension  He has been ambulating on crutches, has taken pain medication, and used ice     He has a history of ACL tear in 3/2015, at the time the patient decided to not proceed with surgical intervention     Review of Systems   Constitution: Negative. Negative for chills, fever and night sweats.   HENT: Negative for congestion and headaches.    Eyes: Negative for blurred vision, left vision loss and right vision loss.   Cardiovascular: Negative for chest pain and syncope.   Respiratory: Negative for cough and shortness of breath.    Endocrine: Negative for polydipsia, polyphagia and polyuria.   Hematologic/Lymphatic: Negative for bleeding problem. Does not bruise/bleed easily.   Skin: Negative for dry skin, itching and rash.   Musculoskeletal: Negative for falls and muscle weakness.   Gastrointestinal: Negative for abdominal pain and bowel incontinence.   Genitourinary: Negative for bladder incontinence and nocturia.   Neurological: Negative for disturbances in coordination, loss of balance and seizures.   Psychiatric/Behavioral: Negative for depression. The patient does not have insomnia.    Allergic/Immunologic: Negative for hives and persistent infections.     PAST MEDICAL HISTORY: History reviewed. No pertinent past medical history.  PAST SURGICAL HISTORY:   Past Surgical History:  "  Procedure Laterality Date    ARTHROSCOPIC FEMOROPLASTY Right 2/11/2016    [facility] OR    HIP SURGERY Right 2/11/2016    Dr Nguyen     INJECTION MAJOR JOINT Right 1/4/2016    [facility]formed [facility] Rahul [facility]josa [facility] at [facility] PAIN MGT    INJECTION-JOINT Right 8/12/2015    [facility]um [facility] at [facility] PAIN MGT    REPAIR-LIGAMENT-ANKLE Right 12/13/2016    [facility] OR 1ST FLR    SYNOVECTOMY-HIP / C-ARM Right 2/11/2016    [facility]    TRIMMING-ACETABULAR RIM - to include capsular closure Right 2/11/2016    [facility]    WISDOM TOOTH EXTRACTION       FAMILY HISTORY:   Family History   Problem Relation Age of Onset    No Known Problems Mother     No Known Problems Father      SOCIAL HISTORY:   Social History     Socioeconomic History    Marital status: Single     Spouse name: Not on file    Number of children: Not on file    Years of education: Not on file    Highest education level: Not on file   Social Needs    Financial resource strain: Not on file    Food insecurity - worry: Not on file    Food insecurity - inability: Not on file    Transportation needs - medical: Not on file    Transportation needs - non-medical: Not on file   Occupational History    Not on file   Tobacco Use    Smoking status: Former Smoker     Packs/day: 0.50     Years: 2.00     Pack years: 1.00     Types: Cigarettes     Start date: 12/27/2016    Smokeless tobacco: Never Used   Substance and Sexual Activity    Alcohol use: Yes     Alcohol/week: 0.0 oz     Comment: social     Drug use: No    Sexual activity: Not on file   Other Topics Concern    Not on file   Social History Narrative    Not on file       MEDICATIONS: No current outpatient medications on file.  ALLERGIES: Review of patient's allergies indicates:  No Known Allergies    VITAL SIGNS: /69   Pulse 68   Ht 5' 8" (1.727 m)   Wt 77.1 kg " (170 lb)   BMI 25.85 kg/m²      PHYSICAL EXAMINATION    General:  The patient is alert and oriented x 3.  Mood is pleasant.  Observation of ears, eyes and nose reveal no gross abnormalities.  No labored breathing observed.    Left KNEE EXAMINATION     OBSERVATION / INSPECTION   Gait:   Nonantalgic   Alignment:  Neutral   Scars:   None   Muscle atrophy: Unable to perform quad contraction  Effusion:  Moderate   Warmth:  None   Discoloration:   none     TENDERNESS / CREPITUS (T / C):          T / C      T / C   Patella   - / -   Lateral joint line   - / -   Peripatellar medial  -  Medial joint line    + / -   Peripatellar lateral -  Medial plica   - / -   Patellar tendon -   Popliteal fossa   - / -   Quad tendon   -   Gastrocnemius   -   Prepatellar Bursa - / -   Quadricep   -   Tibial tubercle  -  Thigh/hamstring  -   Pes anserine/HS -  Fibula    -   ITB   - / -  Tibia     -   Tib/fib joint  - / -  LCL    -     MFC   - / -   MCL: Proximal  -    LFC   - / -    Distal   -          ROM: (* = pain)  PASSIVE   ACTIVE    Left :   Lacking 10 / 95  Lacking 15 / 0 / 85     Right :    5 / 0 / 140   5 / 0 / 140    Patellofemoral examination:  See above noted areas of tenderness.   Patella position    Subluxation / dislocation: Centered           Sup. / Inf;   Normal   Crepitus (PF):    Absent   Patellar Mobility:       Medial-lateral:   Normal    Superior-inferior:  Normal    Inferior tilt   Normal    Patellar tendon:  Normal   Lateral tilt:    Normal   J-sign:     None   Patellofemoral grind:   No pain       MENISCAL SIGNS:     Pain on terminal extension:  +  Pain on terminal flexion:  +  Servandos maneuver:  + for pain  Squat     NT    LIGAMENT EXAMINATION:  ACL / Lachman:  3b positive    PCL-Post.  drawer: normal 0 to 2mm  MCL- Valgus:  normal 0 to 2mm  LCL- Varus:  normal 0 to 2mm  Pivot shift: Positive shift, patient guarding during examination   Dial Test: difference c/w other side   At 30° flexion: normal (< 5°)    At  90° flexion: normal (< 5°)   Reverse Pivot Shift:   normal (Equal)     STRENGTH: (* = with pain) PAINFUL SIDE   Quadricep   4/5   Hamstrin/5    EXTREMITY NEURO-VASCULAR EXAMINATION:   Sensation:  Grossly intact to light touch all dermatomal regions.   Motor Function:  Fully intact motor function at hip, knee, foot and ankle    DTRs;  quadriceps and  achilles 2+.  No clonus and downgoing Babinski.    Vascular status:  DP and PT pulses 2+, brisk capillary refill, symmetric.     Other Findings:        ASSESSMENT:    Left Knee ACL tear, probable meniscus tear     PLAN:   MRI Left knee  Hold out of sports until MRI  Short runner for support when ambulating  All questions were answered, pt will contact us for questions or concerns in the interim.    PROCEDURE NOTE: left KNEE ASPIRATION  After time out was performed, including verification of patient ID, procedure, site and side, availability of information and equipment, review of safety issues, and agreement with consent, the procedure site was marked and the patient was prepped aseptically . A therapeutic aspiration was performed of 40cc bloody fluid under sterile technique using a 22g x 1.5 needle from the knee superolaterally in supine position.   Felt better after. ROM improved immediately.    The patient had no adverse reactions to the medication. Pain decreased. The patient was instructed to apply ice to the joint for 20 minutes and avoid strenuous activities for 24-36 hours following the injection. Following that time, patient can resume regular activities.

## 2018-11-30 ENCOUNTER — HOSPITAL ENCOUNTER (OUTPATIENT)
Dept: RADIOLOGY | Facility: HOSPITAL | Age: 23
Discharge: HOME | End: 2018-11-30
Attending: ORTHOPAEDIC SURGERY
Payer: COMMERCIAL

## 2018-11-30 DIAGNOSIS — S83.512D RUPTURE OF ANTERIOR CRUCIATE LIGAMENT OF LEFT KNEE, SUBSEQUENT ENCOUNTER: ICD-10-CM

## 2018-11-30 DIAGNOSIS — M25.562 ACUTE PAIN OF LEFT KNEE: ICD-10-CM

## 2018-11-30 PROCEDURE — 73721 MRI JNT OF LWR EXTRE W/O DYE: CPT | Mod: 26,LT,, | Performed by: RADIOLOGY

## 2018-11-30 PROCEDURE — 73721 MRI JNT OF LWR EXTRE W/O DYE: CPT | Mod: TC,LT

## 2018-12-03 ENCOUNTER — OFFICE VISIT (OUTPATIENT)
Dept: SPORTS MEDICINE | Facility: CLINIC | Age: 23
End: 2018-12-03
Payer: COMMERCIAL

## 2018-12-03 VITALS
BODY MASS INDEX: 25.76 KG/M2 | HEART RATE: 68 BPM | HEIGHT: 68 IN | DIASTOLIC BLOOD PRESSURE: 65 MMHG | WEIGHT: 170 LBS | SYSTOLIC BLOOD PRESSURE: 131 MMHG

## 2018-12-03 DIAGNOSIS — S83.212A BUCKET-HANDLE TEAR OF MEDIAL MENISCUS OF LEFT KNEE AS CURRENT INJURY, INITIAL ENCOUNTER: ICD-10-CM

## 2018-12-03 DIAGNOSIS — S83.512D RUPTURE OF ANTERIOR CRUCIATE LIGAMENT OF LEFT KNEE, SUBSEQUENT ENCOUNTER: Primary | ICD-10-CM

## 2018-12-03 DIAGNOSIS — S83.262A: ICD-10-CM

## 2018-12-03 DIAGNOSIS — M25.562 ACUTE PAIN OF LEFT KNEE: ICD-10-CM

## 2018-12-03 PROCEDURE — 3008F BODY MASS INDEX DOCD: CPT | Mod: CPTII,S$GLB,, | Performed by: PHYSICIAN ASSISTANT

## 2018-12-03 PROCEDURE — 99999 PR PBB SHADOW E&M-EST. PATIENT-LVL III: CPT | Mod: PBBFAC,,, | Performed by: PHYSICIAN ASSISTANT

## 2018-12-03 PROCEDURE — 99215 OFFICE O/P EST HI 40 MIN: CPT | Mod: S$GLB,,, | Performed by: PHYSICIAN ASSISTANT

## 2018-12-05 DIAGNOSIS — S83.282A ACUTE LATERAL MENISCUS TEAR OF LEFT KNEE, INITIAL ENCOUNTER: ICD-10-CM

## 2018-12-05 DIAGNOSIS — S83.242A ACUTE MEDIAL MENISCUS TEAR OF LEFT KNEE, INITIAL ENCOUNTER: ICD-10-CM

## 2018-12-05 DIAGNOSIS — S83.512A RUPTURE OF ANTERIOR CRUCIATE LIGAMENT OF LEFT KNEE, INITIAL ENCOUNTER: Primary | ICD-10-CM

## 2018-12-05 DIAGNOSIS — M23.92 INTERNAL DERANGEMENT OF LEFT KNEE: ICD-10-CM

## 2018-12-11 ENCOUNTER — TELEPHONE (OUTPATIENT)
Dept: SPORTS MEDICINE | Facility: CLINIC | Age: 23
End: 2018-12-11

## 2018-12-12 ENCOUNTER — TELEPHONE (OUTPATIENT)
Dept: SPORTS MEDICINE | Facility: CLINIC | Age: 23
End: 2018-12-12

## 2018-12-12 NOTE — TELEPHONE ENCOUNTER
----- Message from Mandy Pierre sent at 12/12/2018  8:46 AM CST -----  Contact: Dariana Rossi  Patient needs to reschedule post-op appt to different day.  [phone]

## 2018-12-12 NOTE — TELEPHONE ENCOUNTER
Called Pt and pt's Father in order to r/s Pre op appointment scheduled for today 12/12/18.  No answer,LVM.

## 2018-12-17 ENCOUNTER — ANESTHESIA EVENT (OUTPATIENT)
Dept: SURGERY | Facility: OTHER | Age: 23
End: 2018-12-17
Payer: COMMERCIAL

## 2018-12-17 ENCOUNTER — OFFICE VISIT (OUTPATIENT)
Dept: SPORTS MEDICINE | Facility: CLINIC | Age: 23
End: 2018-12-17
Payer: COMMERCIAL

## 2018-12-17 ENCOUNTER — HOSPITAL ENCOUNTER (OUTPATIENT)
Dept: PREADMISSION TESTING | Facility: OTHER | Age: 23
Discharge: HOME | End: 2018-12-17
Attending: ORTHOPAEDIC SURGERY
Payer: COMMERCIAL

## 2018-12-17 VITALS
HEIGHT: 67 IN | DIASTOLIC BLOOD PRESSURE: 75 MMHG | WEIGHT: 170 LBS | SYSTOLIC BLOOD PRESSURE: 145 MMHG | BODY MASS INDEX: 26.68 KG/M2 | HEART RATE: 78 BPM

## 2018-12-17 VITALS
BODY MASS INDEX: 26.68 KG/M2 | TEMPERATURE: 98 F | OXYGEN SATURATION: 98 % | WEIGHT: 170 LBS | HEIGHT: 67 IN | DIASTOLIC BLOOD PRESSURE: 59 MMHG | SYSTOLIC BLOOD PRESSURE: 116 MMHG | HEART RATE: 70 BPM | RESPIRATION RATE: 16 BRPM

## 2018-12-17 DIAGNOSIS — S83.282A ACUTE LATERAL MENISCUS TEAR OF LEFT KNEE, INITIAL ENCOUNTER: ICD-10-CM

## 2018-12-17 DIAGNOSIS — S83.242A ACUTE MEDIAL MENISCUS TEAR OF LEFT KNEE, INITIAL ENCOUNTER: ICD-10-CM

## 2018-12-17 DIAGNOSIS — S83.512A RUPTURE OF ANTERIOR CRUCIATE LIGAMENT OF LEFT KNEE, INITIAL ENCOUNTER: Primary | ICD-10-CM

## 2018-12-17 PROCEDURE — 99499 UNLISTED E&M SERVICE: CPT | Mod: S$GLB,,, | Performed by: PHYSICIAN ASSISTANT

## 2018-12-17 PROCEDURE — 99999 PR PBB SHADOW E&M-EST. PATIENT-LVL III: CPT | Mod: PBBFAC,,, | Performed by: PHYSICIAN ASSISTANT

## 2018-12-17 RX ORDER — OXYCODONE HCL 10 MG/1
10 TABLET, FILM COATED, EXTENDED RELEASE ORAL
Status: CANCELLED | OUTPATIENT
Start: 2018-12-18 | End: 2018-12-18

## 2018-12-17 RX ORDER — SODIUM CHLORIDE, SODIUM LACTATE, POTASSIUM CHLORIDE, CALCIUM CHLORIDE 600; 310; 30; 20 MG/100ML; MG/100ML; MG/100ML; MG/100ML
INJECTION, SOLUTION INTRAVENOUS CONTINUOUS
Status: CANCELLED | OUTPATIENT
Start: 2018-12-17

## 2018-12-17 RX ORDER — TRAMADOL HYDROCHLORIDE 50 MG/1
50-100 TABLET ORAL EVERY 6 HOURS PRN
Qty: 28 TABLET | Refills: 0 | Status: SHIPPED | OUTPATIENT
Start: 2018-12-17 | End: 2019-01-02 | Stop reason: SDUPTHER

## 2018-12-17 RX ORDER — OXYCODONE AND ACETAMINOPHEN 10; 325 MG/1; MG/1
TABLET ORAL
Qty: 28 TABLET | Refills: 0 | Status: SHIPPED | OUTPATIENT
Start: 2018-12-17 | End: 2018-12-21 | Stop reason: SDUPTHER

## 2018-12-17 RX ORDER — MIDAZOLAM HYDROCHLORIDE 1 MG/ML
2 INJECTION INTRAMUSCULAR; INTRAVENOUS
Status: CANCELLED | OUTPATIENT
Start: 2018-12-17 | End: 2018-12-17

## 2018-12-17 RX ORDER — FAMOTIDINE 20 MG/1
20 TABLET, FILM COATED ORAL
Status: CANCELLED | OUTPATIENT
Start: 2018-12-17 | End: 2018-12-17

## 2018-12-17 RX ORDER — PROMETHAZINE HYDROCHLORIDE 25 MG/1
25 TABLET ORAL EVERY 6 HOURS PRN
Qty: 16 TABLET | Refills: 0 | Status: SHIPPED | OUTPATIENT
Start: 2018-12-17 | End: 2019-02-12

## 2018-12-17 RX ORDER — PREGABALIN 75 MG/1
150 CAPSULE ORAL
Status: DISCONTINUED | OUTPATIENT
Start: 2018-12-18 | End: 2018-12-18 | Stop reason: HOSPADM

## 2018-12-17 RX ORDER — LIDOCAINE HYDROCHLORIDE 10 MG/ML
0.5 INJECTION, SOLUTION EPIDURAL; INFILTRATION; INTRACAUDAL; PERINEURAL ONCE
Status: CANCELLED | OUTPATIENT
Start: 2018-12-17 | End: 2018-12-17

## 2018-12-17 NOTE — ANESTHESIA PREPROCEDURE EVALUATION
12/17/2018  Flo Mendez Jr. is a 23 y.o., male.    Anesthesia Evaluation    I have reviewed the Patient Summary Reports.    I have reviewed the Nursing Notes.   I have reviewed the Medications.     Review of Systems  Anesthesia Hx:  No previous Anesthesia  History of prior surgery of interest to airway management or planning: Previous anesthesia: General Knee surg 2016 [facility] with general anesthesia.  Procedure performed at an [facility]. Airway issues documented on chart review include mask, easy, GETA, glidescope used  Denies Family Hx of Anesthesia complications.   Denies Personal Hx of Anesthesia complications.   Social:  Smoker    Hematology/Oncology:  Hematology Normal   Oncology Normal     EENT/Dental:EENT/Dental Normal   Cardiovascular:  Cardiovascular Normal     Pulmonary:  Pulmonary Normal    Renal/:  Renal/ Normal     Hepatic/GI:  Hepatic/GI Normal    Musculoskeletal:  Musculoskeletal Normal    Neurological:  Neurology Normal    Endocrine:  Endocrine Normal    Dermatological:  Skin Normal    Psych:  Psychiatric Normal           Physical Exam  General:  Well nourished    Airway/Jaw/Neck:  Airway Findings: Mouth Opening: Normal Tongue: Normal  Mallampati: I      Dental:  Dental Findings: In tact        Mental Status:  Mental Status Findings:  Cooperative, Alert and Oriented         Anesthesia Plan  Type of Anesthesia, risks & benefits discussed:  Anesthesia Type:  general  Patient's Preference:   Intra-op Monitoring Plan:   Intra-op Monitoring Plan Comments:   Post Op Pain Control Plan: multimodal analgesia  Post Op Pain Control Plan Comments:   Induction:   IV  Beta Blocker:         Informed Consent: Patient understands risks and agrees with Anesthesia plan.  Questions answered. Anesthesia consent signed with patient.  ASA Score: 2     Day of Surgery Review of History & Physical:     H&P update referred to the surgeon.     Anesthesia Plan Notes: Smoker ,no labs        Ready For Surgery From Anesthesia Perspective.

## 2018-12-17 NOTE — DISCHARGE INSTRUCTIONS
PRE-ADMIT TESTING -  [phone]    [address]  [place] [facility]          Your surgery has been scheduled at [facility]. We are pleased to have the opportunity to serve you. For Further Information please call [phone].    On the day of surgery please report to the Information Desk on the 1st floor.    · CONTACT YOUR PHYSICIAN'S OFFICE THE DAY PRIOR TO YOUR SURGERY TO OBTAIN YOUR ARRIVAL TIME.     · The evening before surgery do not eat anything after 9 p.m. ( this includes hard candy, chewing gum and mints).  You may only have GATORADE, POWERADE AND WATER  from 9 p.m. until you leave your home.   DO NOT DRINK ANY LIQUIDS ON THE WAY TO THE HOSPITAL.      SPECIAL MEDICATION INSTRUCTIONS: TAKE medications checked off by the Anesthesiologist on your Medication List.    Angiogram Patients: Take medications as instructed by your physician, including aspirin.     Surgery Patients:    If you take ASPIRIN - Your PHYSICIAN/SURGEON will need to inform you IF/OR when you need to stop taking aspirin prior to your surgery.     Do Not take any medications containing IBUPROFEN.  Do Not Wear any make-up or dark nail polish   (especially eye make-up) to surgery. If you come to surgery with makeup on you will be required to remove the makeup or nail polish.    Do not shave your surgical area at least 5 days prior to your surgery. The surgical prep will be performed at the hospital according to Infection Control regulations.    Leave all valuables at home.   Do Not wear any jewelry or watches, including any metal in body piercings.  Contact Lens must be removed before surgery. Either do not wear the contact lens or bring a case and solution for storage.  Please bring a container for eyeglasses or dentures as required.  Bring any paperwork your physician has provided, such as consent forms,  history and physicals, doctor's orders, etc.   Bring comfortable clothes that are loose fitting to wear upon  discharge. Take into consideration the type of surgery being performed.  Maintain your diet as advised per your physician the day prior to surgery.      Adequate rest the night before surgery is advised.   Park in the Parking lot behind the hospital or in the [facility] across the street from the parking lot. Parking is complimentary.  If you will be discharged the same day as your procedure, please arrange for a responsible adult to drive you home or to accompany you if traveling by taxi.   YOU WILL NOT BE PERMITTED TO DRIVE OR TO LEAVE THE HOSPITAL ALONE AFTER SURGERY.   It is strongly recommended that you arrange for someone to remain with you for the first 24 hrs following your surgery.       Thank you for your cooperation.  The Staff of [facility].        Bathing Instructions                                                                 Please shower the evening before and morning of your procedure with    ANTIBACTERIAL SOAP. ( DIAL, etc )  Concentrate on the surgical area   for at least 3 minutes and rinse completely. Dry off as usual.   Do not use any deodorant, powder, body lotions, perfume, after shave or    cologne.

## 2018-12-18 ENCOUNTER — ANESTHESIA (OUTPATIENT)
Dept: SURGERY | Facility: OTHER | Age: 23
End: 2018-12-18
Payer: COMMERCIAL

## 2018-12-18 ENCOUNTER — HOSPITAL ENCOUNTER (OUTPATIENT)
Facility: OTHER | Age: 23
Discharge: HOME | End: 2018-12-18
Attending: ORTHOPAEDIC SURGERY | Admitting: ORTHOPAEDIC SURGERY
Payer: COMMERCIAL

## 2018-12-18 VITALS
OXYGEN SATURATION: 97 % | RESPIRATION RATE: 18 BRPM | HEIGHT: 67 IN | DIASTOLIC BLOOD PRESSURE: 76 MMHG | HEART RATE: 90 BPM | BODY MASS INDEX: 26.68 KG/M2 | TEMPERATURE: 98 F | WEIGHT: 170 LBS | SYSTOLIC BLOOD PRESSURE: 134 MMHG

## 2018-12-18 DIAGNOSIS — S83.282A ACUTE LATERAL MENISCUS TEAR OF LEFT KNEE, INITIAL ENCOUNTER: ICD-10-CM

## 2018-12-18 DIAGNOSIS — S83.512A RUPTURE OF ANTERIOR CRUCIATE LIGAMENT OF LEFT KNEE, INITIAL ENCOUNTER: ICD-10-CM

## 2018-12-18 DIAGNOSIS — S83.242A ACUTE MEDIAL MENISCUS TEAR OF LEFT KNEE, INITIAL ENCOUNTER: ICD-10-CM

## 2018-12-18 PROCEDURE — 29882 ARTHRS KNE SRG MNISC RPR M/L: CPT | Mod: 22,51,LT, | Performed by: ORTHOPAEDIC SURGERY

## 2018-12-18 PROCEDURE — 63600175 PHARM REV CODE 636 W HCPCS: Performed by: ANESTHESIOLOGY

## 2018-12-18 PROCEDURE — 99499 UNLISTED E&M SERVICE: CPT | Mod: ,,, | Performed by: PHYSICIAN ASSISTANT

## 2018-12-18 PROCEDURE — 63600175 PHARM REV CODE 636 W HCPCS: Performed by: NURSE ANESTHETIST, CERTIFIED REGISTERED

## 2018-12-18 PROCEDURE — 25000003 PHARM REV CODE 250: Performed by: ANESTHESIOLOGY

## 2018-12-18 PROCEDURE — 25000003 PHARM REV CODE 250: Performed by: NURSE ANESTHETIST, CERTIFIED REGISTERED

## 2018-12-18 PROCEDURE — 25000003 PHARM REV CODE 250: Performed by: ORTHOPAEDIC SURGERY

## 2018-12-18 PROCEDURE — 63600175 PHARM REV CODE 636 W HCPCS: Performed by: SPECIALIST

## 2018-12-18 PROCEDURE — 36000711: Performed by: ORTHOPAEDIC SURGERY

## 2018-12-18 PROCEDURE — 25000003 PHARM REV CODE 250: Performed by: PHYSICIAN ASSISTANT

## 2018-12-18 PROCEDURE — 71000015 HC POSTOP RECOV 1ST HR: Performed by: ORTHOPAEDIC SURGERY

## 2018-12-18 PROCEDURE — 63600175 PHARM REV CODE 636 W HCPCS: Performed by: PHYSICIAN ASSISTANT

## 2018-12-18 PROCEDURE — 27201423 OPTIME MED/SURG SUP & DEVICES STERILE SUPPLY: Performed by: ORTHOPAEDIC SURGERY

## 2018-12-18 PROCEDURE — 63600175 PHARM REV CODE 636 W HCPCS: Performed by: ORTHOPAEDIC SURGERY

## 2018-12-18 PROCEDURE — 29881 ARTHRS KNE SRG MNISECTMY M/L: CPT | Mod: 59,LT,, | Performed by: ORTHOPAEDIC SURGERY

## 2018-12-18 PROCEDURE — 36000710: Performed by: ORTHOPAEDIC SURGERY

## 2018-12-18 PROCEDURE — 29888 ARTHRS AID ACL RPR/AGMNTJ: CPT | Mod: LT,,, | Performed by: ORTHOPAEDIC SURGERY

## 2018-12-18 PROCEDURE — 71000039 HC RECOVERY, EACH ADD'L HOUR: Performed by: ORTHOPAEDIC SURGERY

## 2018-12-18 PROCEDURE — 71000033 HC RECOVERY, INTIAL HOUR: Performed by: ORTHOPAEDIC SURGERY

## 2018-12-18 PROCEDURE — 37000008 HC ANESTHESIA 1ST 15 MINUTES: Performed by: ORTHOPAEDIC SURGERY

## 2018-12-18 PROCEDURE — 90471 IMMUNIZATION ADMIN: CPT | Performed by: ORTHOPAEDIC SURGERY

## 2018-12-18 PROCEDURE — C1713 ANCHOR/SCREW BN/BN,TIS/BN: HCPCS | Performed by: ORTHOPAEDIC SURGERY

## 2018-12-18 PROCEDURE — 37000009 HC ANESTHESIA EA ADD 15 MINS: Performed by: ORTHOPAEDIC SURGERY

## 2018-12-18 PROCEDURE — 90686 IIV4 VACC NO PRSV 0.5 ML IM: CPT | Performed by: ORTHOPAEDIC SURGERY

## 2018-12-18 PROCEDURE — 71000016 HC POSTOP RECOV ADDL HR: Performed by: ORTHOPAEDIC SURGERY

## 2018-12-18 PROCEDURE — C1889 IMPLANT/INSERT DEVICE, NOC: HCPCS | Performed by: ORTHOPAEDIC SURGERY

## 2018-12-18 DEVICE — SCREW CANNULATED 9 X 20MM: Type: IMPLANTABLE DEVICE | Site: KNEE | Status: FUNCTIONAL

## 2018-12-18 DEVICE — SYS NDL DELIVERY FAST FIX 360: Type: IMPLANTABLE DEVICE | Site: KNEE | Status: FUNCTIONAL

## 2018-12-18 DEVICE — PIN GUIDE GRAFT PASS XACTPIN: Type: IMPLANTABLE DEVICE | Site: KNEE | Status: FUNCTIONAL

## 2018-12-18 DEVICE — SCREW SHTH CANN INTFR 8X20MM: Type: IMPLANTABLE DEVICE | Site: KNEE | Status: FUNCTIONAL

## 2018-12-18 RX ORDER — MIDAZOLAM HYDROCHLORIDE 1 MG/ML
4 INJECTION INTRAMUSCULAR; INTRAVENOUS
Status: DISCONTINUED | OUTPATIENT
Start: 2018-12-18 | End: 2018-12-18 | Stop reason: HOSPADM

## 2018-12-18 RX ORDER — KETAMINE HYDROCHLORIDE 50 MG/ML
INJECTION, SOLUTION INTRAMUSCULAR; INTRAVENOUS
Status: DISCONTINUED | OUTPATIENT
Start: 2018-12-18 | End: 2018-12-18 | Stop reason: HOSPADM

## 2018-12-18 RX ORDER — FENTANYL CITRATE 50 UG/ML
100 INJECTION, SOLUTION INTRAMUSCULAR; INTRAVENOUS EVERY 5 MIN PRN
Status: DISCONTINUED | OUTPATIENT
Start: 2018-12-18 | End: 2018-12-18 | Stop reason: HOSPADM

## 2018-12-18 RX ORDER — ROPIVACAINE HYDROCHLORIDE 5 MG/ML
INJECTION, SOLUTION EPIDURAL; INFILTRATION; PERINEURAL
Status: COMPLETED | OUTPATIENT
Start: 2018-12-18 | End: 2018-12-18

## 2018-12-18 RX ORDER — SODIUM CHLORIDE, SODIUM LACTATE, POTASSIUM CHLORIDE, CALCIUM CHLORIDE 600; 310; 30; 20 MG/100ML; MG/100ML; MG/100ML; MG/100ML
INJECTION, SOLUTION INTRAVENOUS CONTINUOUS
Status: DISCONTINUED | OUTPATIENT
Start: 2018-12-18 | End: 2018-12-18 | Stop reason: HOSPADM

## 2018-12-18 RX ORDER — LIDOCAINE HYDROCHLORIDE 10 MG/ML
0.5 INJECTION, SOLUTION EPIDURAL; INFILTRATION; INTRACAUDAL; PERINEURAL ONCE
Status: DISCONTINUED | OUTPATIENT
Start: 2018-12-18 | End: 2018-12-18 | Stop reason: HOSPADM

## 2018-12-18 RX ORDER — MEPERIDINE HYDROCHLORIDE 50 MG/ML
12.5 INJECTION INTRAMUSCULAR; INTRAVENOUS; SUBCUTANEOUS ONCE AS NEEDED
Status: COMPLETED | OUTPATIENT
Start: 2018-12-18 | End: 2018-12-18

## 2018-12-18 RX ORDER — FAMOTIDINE 20 MG/1
20 TABLET, FILM COATED ORAL
Status: COMPLETED | OUTPATIENT
Start: 2018-12-18 | End: 2018-12-18

## 2018-12-18 RX ORDER — KETOROLAC TROMETHAMINE 30 MG/ML
INJECTION, SOLUTION INTRAMUSCULAR; INTRAVENOUS
Status: DISCONTINUED | OUTPATIENT
Start: 2018-12-18 | End: 2018-12-18 | Stop reason: HOSPADM

## 2018-12-18 RX ORDER — PROPOFOL 10 MG/ML
VIAL (ML) INTRAVENOUS
Status: DISCONTINUED | OUTPATIENT
Start: 2018-12-18 | End: 2018-12-18

## 2018-12-18 RX ORDER — OXYCODONE HYDROCHLORIDE 5 MG/1
10 TABLET ORAL EVERY 4 HOURS PRN
Status: CANCELLED | OUTPATIENT
Start: 2018-12-18

## 2018-12-18 RX ORDER — CEFAZOLIN SODIUM 2 G/50ML
2 SOLUTION INTRAVENOUS
Status: COMPLETED | OUTPATIENT
Start: 2018-12-18 | End: 2018-12-18

## 2018-12-18 RX ORDER — OXYCODONE HYDROCHLORIDE 5 MG/1
5 TABLET ORAL
Status: DISCONTINUED | OUTPATIENT
Start: 2018-12-18 | End: 2018-12-18 | Stop reason: HOSPADM

## 2018-12-18 RX ORDER — EPINEPHRINE 1 MG/ML
INJECTION, SOLUTION INTRACARDIAC; INTRAMUSCULAR; INTRAVENOUS; SUBCUTANEOUS
Status: DISCONTINUED | OUTPATIENT
Start: 2018-12-18 | End: 2018-12-18 | Stop reason: HOSPADM

## 2018-12-18 RX ORDER — FENTANYL CITRATE 50 UG/ML
25 INJECTION, SOLUTION INTRAMUSCULAR; INTRAVENOUS EVERY 5 MIN PRN
Status: DISCONTINUED | OUTPATIENT
Start: 2018-12-18 | End: 2018-12-18 | Stop reason: HOSPADM

## 2018-12-18 RX ORDER — MIDAZOLAM HYDROCHLORIDE 1 MG/ML
2 INJECTION INTRAMUSCULAR; INTRAVENOUS
Status: COMPLETED | OUTPATIENT
Start: 2018-12-18 | End: 2018-12-18

## 2018-12-18 RX ORDER — HYDROMORPHONE HYDROCHLORIDE 2 MG/ML
0.4 INJECTION, SOLUTION INTRAMUSCULAR; INTRAVENOUS; SUBCUTANEOUS EVERY 5 MIN PRN
Status: DISCONTINUED | OUTPATIENT
Start: 2018-12-18 | End: 2018-12-18 | Stop reason: HOSPADM

## 2018-12-18 RX ORDER — PROMETHAZINE HYDROCHLORIDE 25 MG/1
25 TABLET ORAL EVERY 6 HOURS PRN
Status: CANCELLED | OUTPATIENT
Start: 2018-12-18

## 2018-12-18 RX ORDER — FENTANYL CITRATE 50 UG/ML
INJECTION, SOLUTION INTRAMUSCULAR; INTRAVENOUS
Status: DISCONTINUED | OUTPATIENT
Start: 2018-12-18 | End: 2018-12-18

## 2018-12-18 RX ORDER — SODIUM CHLORIDE 0.9 % (FLUSH) 0.9 %
3 SYRINGE (ML) INJECTION
Status: DISCONTINUED | OUTPATIENT
Start: 2018-12-18 | End: 2018-12-18 | Stop reason: HOSPADM

## 2018-12-18 RX ORDER — DIPHENHYDRAMINE HYDROCHLORIDE 50 MG/ML
12.5 INJECTION INTRAMUSCULAR; INTRAVENOUS EVERY 30 MIN PRN
Status: DISCONTINUED | OUTPATIENT
Start: 2018-12-18 | End: 2018-12-18

## 2018-12-18 RX ORDER — LIDOCAINE HCL/PF 100 MG/5ML
SYRINGE (ML) INTRAVENOUS
Status: DISCONTINUED | OUTPATIENT
Start: 2018-12-18 | End: 2018-12-18

## 2018-12-18 RX ORDER — TRAMADOL HYDROCHLORIDE 50 MG/1
100 TABLET ORAL ONCE
Status: COMPLETED | OUTPATIENT
Start: 2018-12-18 | End: 2018-12-18

## 2018-12-18 RX ORDER — ONDANSETRON 2 MG/ML
4 INJECTION INTRAMUSCULAR; INTRAVENOUS DAILY PRN
Status: DISCONTINUED | OUTPATIENT
Start: 2018-12-18 | End: 2018-12-18 | Stop reason: HOSPADM

## 2018-12-18 RX ORDER — VANCOMYCIN HYDROCHLORIDE 1 G/20ML
INJECTION, POWDER, LYOPHILIZED, FOR SOLUTION INTRAVENOUS
Status: DISCONTINUED | OUTPATIENT
Start: 2018-12-18 | End: 2018-12-18 | Stop reason: HOSPADM

## 2018-12-18 RX ORDER — ROPIVACAINE HYDROCHLORIDE 5 MG/ML
INJECTION, SOLUTION EPIDURAL; INFILTRATION; PERINEURAL
Status: DISCONTINUED | OUTPATIENT
Start: 2018-12-18 | End: 2018-12-18 | Stop reason: HOSPADM

## 2018-12-18 RX ORDER — HYDROMORPHONE HYDROCHLORIDE 2 MG/ML
INJECTION, SOLUTION INTRAMUSCULAR; INTRAVENOUS; SUBCUTANEOUS
Status: DISCONTINUED | OUTPATIENT
Start: 2018-12-18 | End: 2018-12-18

## 2018-12-18 RX ORDER — PROPOFOL 10 MG/ML
VIAL (ML) INTRAVENOUS CONTINUOUS PRN
Status: DISCONTINUED | OUTPATIENT
Start: 2018-12-18 | End: 2018-12-18

## 2018-12-18 RX ORDER — OXYCODONE HCL 10 MG/1
10 TABLET, FILM COATED, EXTENDED RELEASE ORAL
Status: COMPLETED | OUTPATIENT
Start: 2018-12-18 | End: 2018-12-18

## 2018-12-18 RX ORDER — MIDAZOLAM HYDROCHLORIDE 1 MG/ML
INJECTION INTRAMUSCULAR; INTRAVENOUS
Status: DISCONTINUED | OUTPATIENT
Start: 2018-12-18 | End: 2018-12-18

## 2018-12-18 RX ORDER — ONDANSETRON 2 MG/ML
4 INJECTION INTRAMUSCULAR; INTRAVENOUS EVERY 12 HOURS PRN
Status: CANCELLED | OUTPATIENT
Start: 2018-12-18

## 2018-12-18 RX ORDER — ONDANSETRON 2 MG/ML
INJECTION INTRAMUSCULAR; INTRAVENOUS
Status: DISCONTINUED | OUTPATIENT
Start: 2018-12-18 | End: 2018-12-18

## 2018-12-18 RX ORDER — MORPHINE SULFATE 10 MG/ML
3 INJECTION INTRAMUSCULAR; INTRAVENOUS; SUBCUTANEOUS EVERY 10 MIN PRN
Status: CANCELLED | OUTPATIENT
Start: 2018-12-18

## 2018-12-18 RX ADMIN — FENTANYL CITRATE 100 MCG: 50 INJECTION, SOLUTION INTRAMUSCULAR; INTRAVENOUS at 07:12

## 2018-12-18 RX ADMIN — FENTANYL CITRATE 25 MCG: 50 INJECTION, SOLUTION INTRAMUSCULAR; INTRAVENOUS at 10:12

## 2018-12-18 RX ADMIN — ROPIVACAINE HYDROCHLORIDE 20 ML: 5 INJECTION, SOLUTION EPIDURAL; INFILTRATION; PERINEURAL at 08:12

## 2018-12-18 RX ADMIN — OXYCODONE HYDROCHLORIDE 10 MG: 10 TABLET, FILM COATED, EXTENDED RELEASE ORAL at 07:12

## 2018-12-18 RX ADMIN — HYDROMORPHONE HYDROCHLORIDE 0.4 MG: 2 INJECTION INTRAMUSCULAR; INTRAVENOUS; SUBCUTANEOUS at 12:12

## 2018-12-18 RX ADMIN — CEFAZOLIN SODIUM 2 G: 2 SOLUTION INTRAVENOUS at 08:12

## 2018-12-18 RX ADMIN — FAMOTIDINE 20 MG: 20 TABLET, FILM COATED ORAL at 07:12

## 2018-12-18 RX ADMIN — FENTANYL CITRATE 50 MCG: 50 INJECTION, SOLUTION INTRAMUSCULAR; INTRAVENOUS at 08:12

## 2018-12-18 RX ADMIN — INFLUENZA A VIRUS A/MICHIGAN/45/2015 X-275 (H1N1) ANTIGEN (FORMALDEHYDE INACTIVATED), INFLUENZA A VIRUS A/SINGAPORE/INFIMH-16-0019/2016 IVR-186 (H3N2) ANTIGEN (FORMALDEHYDE INACTIVATED), INFLUENZA B VIRUS B/PHUKET/3073/2013 ANTIGEN (FORMALDEHYDE INACTIVATED), AND INFLUENZA B VIRUS B/MARYLAND/15/2016 BX-69A ANTIGEN (FORMALDEHYDE INACTIVATED) 0.5 ML: 15; 15; 15; 15 INJECTION, SUSPENSION INTRAMUSCULAR at 01:12

## 2018-12-18 RX ADMIN — CARBOXYMETHYLCELLULOSE SODIUM 2 DROP: 2.5 SOLUTION/ DROPS OPHTHALMIC at 08:12

## 2018-12-18 RX ADMIN — TRAMADOL HYDROCHLORIDE 100 MG: 50 TABLET, FILM COATED ORAL at 01:12

## 2018-12-18 RX ADMIN — LIDOCAINE HYDROCHLORIDE 75 MG: 20 INJECTION, SOLUTION INTRAVENOUS at 08:12

## 2018-12-18 RX ADMIN — FENTANYL CITRATE 25 MCG: 50 INJECTION, SOLUTION INTRAMUSCULAR; INTRAVENOUS at 09:12

## 2018-12-18 RX ADMIN — PROPOFOL 50 MCG/KG/MIN: 10 INJECTION, EMULSION INTRAVENOUS at 10:12

## 2018-12-18 RX ADMIN — SODIUM CHLORIDE, SODIUM LACTATE, POTASSIUM CHLORIDE, AND CALCIUM CHLORIDE: 600; 310; 30; 20 INJECTION, SOLUTION INTRAVENOUS at 07:12

## 2018-12-18 RX ADMIN — OXYCODONE HYDROCHLORIDE 5 MG: 5 TABLET ORAL at 12:12

## 2018-12-18 RX ADMIN — MEPERIDINE HYDROCHLORIDE 12.5 MG: 50 INJECTION INTRAMUSCULAR; INTRAVENOUS; SUBCUTANEOUS at 12:12

## 2018-12-18 RX ADMIN — PROPOFOL 200 MG: 10 INJECTION, EMULSION INTRAVENOUS at 08:12

## 2018-12-18 RX ADMIN — MIDAZOLAM HYDROCHLORIDE 2 MG: 1 INJECTION, SOLUTION INTRAMUSCULAR; INTRAVENOUS at 07:12

## 2018-12-18 RX ADMIN — ONDANSETRON 4 MG: 2 INJECTION INTRAMUSCULAR; INTRAVENOUS at 11:12

## 2018-12-18 RX ADMIN — FENTANYL CITRATE 50 MCG: 50 INJECTION, SOLUTION INTRAMUSCULAR; INTRAVENOUS at 09:12

## 2018-12-18 RX ADMIN — HYDROMORPHONE HYDROCHLORIDE 0.4 MG: 2 INJECTION INTRAMUSCULAR; INTRAVENOUS; SUBCUTANEOUS at 10:12

## 2018-12-18 NOTE — OP NOTE
DATE OF PROCEDURE: 12/18/2018    SURGEON:  Susan Nguyen M.D    ASSISTANT: GLADYS Viera MD PGY3  ASSISTANT: AYO Tovar PA-C      PREOPERATIVE DIAGNOSES:   left  1. knee ACL tear.   2. knee plica.   3. knee synovitis.   4. knee medial meniscus bucket handle tear and lateral meniscus tear   5. knee chondromalacia     POSTOPERATIVE DIAGNOSES:   left  1. knee ACL tear.   2. knee plica.   3. knee synovitis.   4. knee medial meniscus bucket handle tear and lateral meniscus tear   5. knee chondromalacia     PROCEDURE PERFORMED:   left  1. knee arthroscopic ACL reconstruction with patellar tendon autograft (CPT 28796)  2. Knee application of xenograft (Artelon tissue reinforcement) to ACL (CPT 37149)  3. knee arthroscopic partial synovectomy/debridement (CPT 17226).   4. knee arthroscopic plica excision (CPT 32290).   5. knee open patellar tendon repair  6. knee arthroscopic medial meniscus repair (CPT 80986) (complex, -22 modifier)  6. knee arthroscopic partial lateral meniscectomy (CPT 18340)   7. knee autologous bone grafting to patellar and tibial harvest sites  8. knee arthroscopic chondroplasty (CPT 24408)    ANESTHESIA: General with adductor block.     BLOOD LOSS: Minimal.     DRAINS: None.     TOURNIQUET TIME: None.     COMPLICATIONS: None.     CONDITION ON TRANSFER: The patient was extubated and moved to the recovery room in stable condition, with compartments soft and capillary refill less than a second in all digits.     COMPLEX PROCEDURE:  There was an altered surgical field due to the chronic complex bucket-handle medial meniscus tear. There was abnormal anatomy. Complexity of the service was much greater than the normative procedure.  There was increased time, intensity and technical difficulty of the procedure, severity of the patient's condition and mental effort required.  This was a highly complicated repair and tear pattern that required advanced arthroscopic skill in order to safely and technically  perform it. Nevertheless the repair achieved was excellent.    BRIEF INDICATION OF MEDICAL NECESSITY: The patient is a 23 y.o. year-old male who has history and physical examination findings consistent with the above. Nonoperative versus operative options were discussed. The risks and benefits were discussed with the patient. The patient acknowledged understanding and wished to proceed with operative intervention. Informed consent was obtained prior to the procedure. Details of the surgical procedure were explained, including incisions and probable rehabilitation course. The patient understands the likely length of convalescence after surgery; and we have explained the risks, benefits, and alternatives of surgery. Reasonable expectations and potential complications were discussed and acknowledged, including but not limited to infection, bleeding, blood clots, (DVT and/or PE), nerve injury, retear, instability, continued pain and stiffness. It was also explained that there was a chance of failure which may require further management. The patient agreed and understood and wished to proceed.     EXAMINATION UNDER ANESTHESIA of the OPERATIVE left KNEE: ROM 0-145 degrees, grade 3 Lachman, grade 2 pivot shift, stable to varus-valgus stress testing, negative effusion.     EXAMINATION UNDER ANESTHESIA of the NON-OPERATED right KNEE: ROM 0-145 degrees, negative Lachman, negative pivot shift, stable to varus-valgus stress testing, negative effusion.     ACL GRAFT: Autograft BTB:   After preparation measurements:   Tendon graft width: 9mm, top: 28mm, middle: 26mm, bottom: 21mm  Total graft length 81 mm   Tendon length 45 mm   Bone block width: 10 mm   Tibial bone block 21 mm   Femoral bone block 15 mm   TUNNELS: Anatomic   Femoral: AM portal drilled, 25 mm drilled, 23 mm used for graft   Tibial: 60 degree Arthrex guide for drilling   Distance from medial edge of harvest to center of pin: 19 mm  Tibial tunnel screw notching  clock position: 2:00    PROCEDURE IN DETAIL: The correct operative site was marked by the operative   surgeon and femoral nerve block was administered by the anesthesia team. The   patient was then taken to the operating room and placed supine on the operating   room table. General anesthesia was administered by the anesthesia team. All   pressure points were carefully padded and checked. Preoperative antibiotics   were administered. A well-padded tourniquet was placed high on the operative   thigh. Examination was begun with the above findings. The non-operative leg   was then placed a well-padded well-leg mg, in a comfortable position. The   operative leg was placed in an arthroscopic leg mg at the level of the   tourniquet. The operative leg was prepped and draped in the usual sterile   fashion. After prepping and draping, the appropriate landmarks were noted on   the skin.    Attention was then turned to the patella tendon harvest. An 8 cm incision was   made over the medial aspect of the patella tendon. Paratenon was preserved and   blunt dissection was carried down through to the patella tendon with exposing   the distal patella and proximal tibia as well. A 10 cm patella tendon was   harvested in a standard fashion with bone blocks measuring 10 mm in width and 15 mm for femur and 21 mm for tibia in length.  Tendon length was 45 mm.    Graft was carefully taken to the back table and prepared in the standard fashion with 2 drill holes and passing sutures thorough each bone block.     Graft was soaked in 1g Vancomycin powder diluted in 250cc Normal saline.    The knee was insufflated super-laterally with saline. Mid-lateral followed by infero-medial portals were created, and a systematic examination of the joint revealed the following:     There was no evidence of any suprapatellar pouch adhesions or compartmentalization.  There was no evidence of any loose bodies in the medial or lateral gutters.      There was no evidence of any chondral damage to the patella or trochlea.       There was chondral damage to:  Medial femoral condyle 5 x 7 mm grade 1  Chondroplasty was performed using arthroscopic shaver.    Bucket-handle (80% depth) Posterior medial meniscus tear was then repaired, after debridement of inner edges of tear were debrided with shaver.  5 [employer] Fast-Fix sutures were used to secure the meniscus nicely with vertical mattress.  Repair was solid and strong to fixation.  Root and hoop fibers remained intact.    COMPLEX PROCEDURE:  There was an altered surgical field due to the chronic complex bucket-handle medial meniscus tear. There was abnormal anatomy. Complexity of the service was much greater than the normative procedure.  There was increased time, intensity and technical difficulty of the procedure, severity of the patient's condition and mental effort required.  This was a highly complicated repair and tear pattern that required advanced arthroscopic skill in order to safely and technically perform it. Nevertheless the repair achieved was excellent.    5 sutures were used in all for the meniscus repair    Attention was then turned to the notch. The ACL was probed and found to be   incompetent and there was a positive empty-wall sign. The PCL was probed and   found to be stable.     There was a hypertrophic infrapatellar plica.  This was debrided using arthroscopic biter and shaver.    In the lateral compartment there was no evidence of chondral damage.   In the lateral compartment there was no evidence of meniscal instability.   Posterior horn lateral meniscus tear, vertical was debrided with arthroscopic shaver and biter.  10% was debrided over an area of 1 cm.  Root and hoop fibers remained intact.    Synovitis was debrided in the knee as needed to the areas of concern in medial   and lateral compartments.     Attention was then turned to the notch. Preparation of notch was perfomed  with a   minimal notchplasty. Care was taken to remove the soft tissue in over-the-top   position using a curette and thermal device.     Femoral tunnel guidepin was placed into the medial portal at the anatomic   insertion site of the anteromedial and posterolateral bundle junction at the 10   o'clock position and 120 degrees of knee flexion. Guidepin was placed and then   reamed to 23 mm. Care was taken to avoid breach of the lateral cortex. Passing suture was placed and tunnel was cleared of all debris.    Tibial ACL guide was then placed in the appropriate position of the anatomic   insertion of the ACL and tibial guidepin was placed and tibial guidepin position   was found to be at the anatomic insertion site of the ACL and a tibial tunnel   was core drilled to a 9.0 mm, dilated to 9.5 mm and 10.0 mm, which was the size of   the graft.     Artelon graft was fixed proximally to patellar tendon autograft with passing suture through the graft and bone block.  After proximal fixation with IF screw, Artelon graft under 15 lbs of tension (1.5 cm tension).      The graft was then passed without difficulty and 8 x 20 mm Arthrex metal Delta screw was placed in the femoral tunnel (screw/bone/Artelon) with solid fixation and fixation was stable to resistance distally. Under tension distally, the knee was cycled 15 times and was stable to resistance distally. Next, 9 x 20 mm tibial Delta screw was placed in the tibia giving solid fixation to the anterior cruciate ligament graft (screw/bone/Artelon).     The knee was able to extend to 0 degrees without any anterior impingement and   confirmation of the knee being able to be fully extended without impingement was   done prior to fixation distally. The knee was stable with grade 1A Lachman.   Bone graft was placed in the patella as well as the tibia.     Autologous bone graft from reamings was placed, filling patellar harvest site and tibial harvest site was placed,after  irrigation, as described below, was completed    The knee and incisions were then copiously irrigated and fluid was extravasated   using suction. Subcutaneous tissues were closed in layers using inverted 0   Vicryl suture, closing the patellar tendon and paratenon in separate layers. The   subcutaneous tissues were closed using a 0 Vicryl suture in interrupted fashion.   Superficial subcutaneous tissues were closed using a 3-0 Vicryl suture in   interrupted fashion. The skin was closed using a running 4-0 Monocryl suture,   reapproximating the skin edges nicely. The arthroscopic portal incisions were   closed using inverted 4-0 Monocryl suture. Steri-Strips were placed with   Mastisol. Sterile TENS unit pads were placed which were medically necessary for   pain relief. Wounds were dressed with Xeroform, 4x4s, and cast padding. SCOT   hose was placed on the operative leg to match that of the SCOT hose placed   preoperatively on the non-operative leg. Iceman was secured and hinged knee   brace locked in extension was placed.     The patient was extubated and moved to the recovery room in stable condition   with compartments soft and capillary refill less than a second in all digits.     POSTOPERATIVE PLAN: We will be following the arthroscopic PT autograft meniscal repair guidelines. The patient will remain toe-touch weightbearing for 4 weeks. We discussed this with the patient's family after surgery.   The patient will remainin the brace for 6 weeks, brace locked at 0-0 for ambulation x 6 weeks.

## 2018-12-18 NOTE — ANESTHESIA POSTPROCEDURE EVALUATION
"Anesthesia Post Evaluation    Patient: Flo Mendez JrFreedom    Procedure(s) Performed: Procedure(s) (LRB):  ARTHROSCOPIC ACL RECONSTRUCTION WITH PATELLAR TENDON AUTOGRAFT (Left)  ARTHROSCOPY, KNEE, WITH  PARTIAL LATERAL MENISCECTOMY (Left)  REPAIR, ARTHROSCOPIC  MEDIAL MENISCUS, KNEE (Left)  PARTIAL SYNOVECTOMY, KNEE (Left)  CHONDROPLASTY, KNEE (Left)  INSERTION-IMPLANT, Artelon Graft Left Knee (Left)  EXCISION, PLICA, KNEE, ARTHROSCOPIC (Right)  OPEN PATELLAR TENDON REPAIR (Left)  AUTOLOGUS BONE GRAFTING TO PATELLAR AND TIBIAL HARVEST SITES (Left)    Final Anesthesia Type: general  Patient location during evaluation: PACU  Patient participation: Yes- Able to Participate  Level of consciousness: oriented and awake  Post-procedure vital signs: reviewed and stable  Pain management: adequate  Airway patency: patent  PONV status at discharge: No PONV  Anesthetic complications: no      Cardiovascular status: hemodynamically stable  Respiratory status: unassisted, spontaneous ventilation and room air  Hydration status: euvolemic  Follow-up not needed.        Visit Vitals  /77 (BP Location: Right arm, Patient Position: Lying)   Pulse 92   Temp 36.9 °C (98.4 °F) (Oral)   Resp 18   Ht 5' 7" (1.702 m)   Wt 77.1 kg (169 lb 16 oz)   SpO2 98%   BMI 26.63 kg/m²       Pain/Joanna Score: Pain Rating Prior to Med Admin: 6 (12/18/2018  1:54 PM)  Pain Rating Post Med Admin: 3 (12/18/2018  1:29 PM)  Joanna Score: 10 (12/18/2018  1:40 PM)        "

## 2018-12-18 NOTE — H&P
Flo Mendez   is here for a completion of his perioperative paperwork. he  Is scheduled to undergo     left              A. Knee arthroscopic Anterior Cruciate Ligament reconstruction with patellar tendon autograft               B. Knee arthroscopic medial and lateral meniscectomy  possible meniscus repair - medial              C. Knee arthroscopic possible plica excision              D. Knee arthroscopic possible chondroplasty              E. Knee arthroscopic-assisted ligament supplementation with Artelon graft               F.  Knee intra-articular BioD arthroscopic assisted arthrocentesis of viable structural human allograft tissue matrix (BioDRestore) (52403) on 12/18/18      He is a healthy individual and does not need clearance for this procedure.     PAST MEDICAL HISTORY: History reviewed. No pertinent past medical history.  PAST SURGICAL HISTORY:   Past Surgical History:   Procedure Laterality Date    ANKLE SURGERY      ARTHROSCOPIC FEMOROPLASTY Right 2/11/2016    [facility]    HIP SURGERY Right 2/11/2016    Dr Nguyen     INJECTION MAJOR JOINT Right 1/4/2016    Performed [facility] Rahul Hinojosa MD at [facility] PAIN MGT    INJECTION-JOINT Right 8/12/2015    Performed by Govind [facility]ynum MD at [facility] PAIN MGT    REPAIR-LIGAMENT-ANKLE Right 12/13/2016    [facility] OR 1ST FLR    SYNOVECTOMY-HIP / C-ARM Right 2/11/2016    [facility] OR    TRIMMING-ACETABULAR RIM - to include capsular closure Right 2/11/2016    [facility] at [facility] OR    WISDOM TOOTH EXTRACTION       FAMILY HISTORY:   Family History   Problem Relation Age of Onset    No Known Problems Mother     No Known Problems Father      SOCIAL HISTORY:   Social History     Socioeconomic History    Marital status: Single     Spouse name: Not on file    Number of children: Not on file    Years of education: Not on file    Highest education level:  "Not on file   Social Needs    Financial resource strain: Not on file    Food insecurity - worry: Not on file    Food insecurity - inability: Not on file    Transportation needs - medical: Not on file    Transportation needs - non-medical: Not on file   Occupational History    Not on file   Tobacco Use    Smoking status: Current Some Day Smoker     Packs/day: 0.50     Years: 2.00     Pack years: 1.00     Types: Cigarettes    Smokeless tobacco: Never Used   Substance and Sexual Activity    Alcohol use: Yes     Alcohol/week: 0.0 oz     Comment: social     Drug use: No    Sexual activity: Not on file   Other Topics Concern    Not on file   Social History Narrative    Not on file       MEDICATIONS:   Current Outpatient Medications:     oxyCODONE-acetaminophen (PERCOCET)  mg per tablet, Take 1 tablet by mouth every 4-6 hours as needed for pain. Take stool softener with this medication., Disp: 28 tablet, Rfl: 0    promethazine (PHENERGAN) 25 MG tablet, Take 1 tablet (25 mg total) by mouth every 6 (six) hours as needed for Nausea., Disp: 16 tablet, Rfl: 0    traMADol (ULTRAM) 50 mg tablet, Take 1-2 tablets ( mg total) by mouth every 6 (six) hours as needed (surgical pain)., Disp: 28 tablet, Rfl: 0  No current facility-administered medications for this visit.     Facility-Administered Medications Ordered in Other Visits:     [START ON 12/18/2018] pregabalin capsule 150 mg, 150 mg, Oral, Once Pre-Op, Pieter Ford MD  ALLERGIES:   Review of patient's allergies indicates:   Allergen Reactions    Pcn [penicillins] Rash       VITAL SIGNS: BP (!) 145/75   Pulse 78   Ht 5' 7" (1.702 m)   Wt 77.1 kg (170 lb)   BMI 26.63 kg/m²      Risks, indications and benefits of the surgical procedure were discussed with the patient. All questions with regard to surgery, rehab, expected return to functional activities, activities of daily living and recreational endeavors were answered to his " satisfaction.    It was explained to the patient that there may be an increase in surgical risks if the patient has certain co-morbidities such as but not limited to: Obesity, Cardiovascular issues (CHF, CAD, Arrhythmias), chronic pulmonary issues, previous or current neurovascular/neurological issues, previous strokes, diabetes mellitus, previous wound healing issues, previous wound or skin infections, PVD, clotting disorders, if the patient uses chronic steroids, if the patient takes or has immune compromising medications or diseases, or has previously or currently used tobacco products.     The patient verbalized that he/she does not have any additional clotting, bleeding, or blood disorders, other than what is list in her chart on today's review.     Then a brief history and physical exam were performed.    Review of Systems   Constitution: Negative. Negative for chills, fever and night sweats.   HENT: Negative for congestion and headaches.    Eyes: Negative for blurred vision, left vision loss and right vision loss.   Cardiovascular: Negative for chest pain and syncope.   Respiratory: Negative for cough and shortness of breath.    Endocrine: Negative for polydipsia, polyphagia and polyuria.   Hematologic/Lymphatic: Negative for bleeding problem. Does not bruise/bleed easily.   Skin: Negative for dry skin, itching and rash.   Musculoskeletal: Negative for falls and muscle weakness.   Gastrointestinal: Negative for abdominal pain and bowel incontinence.   Genitourinary: Negative for bladder incontinence and nocturia.   Neurological: Negative for disturbances in coordination, loss of balance and seizures.   Psychiatric/Behavioral: Negative for depression. The patient does not have insomnia.    Allergic/Immunologic: Negative for hives and persistent infections.     PHYSICAL EXAM:  GEN: A&Ox3, WD WN NAD  HEENT: WNL  CHEST: CTAB, no W/R/R  HEART: RRR, no M/R/G  ABD: Soft, NT ND, BS x4 QUADS  MS; See Epic  NEURO: CN  II-XII intact       The surgical consent was then reviewed with the patient, who agreed with all the contents of the consent form and it was signed. he was then given the [facility] surgery packet to bring with him to [facility] for the anesthesia portion of his perioperative paperwork.   For all physicians except for Dr. Ivory, we will email and possibly fax [facility]    The patient was given the opportunity to ask questions about the surgical plan and consent form, and once no other questions were asked, I proceeded with the pre-op appointment.    PHYSICAL THERAPY:  He was also instructed regarding physical therapy and will begin on  POD1. He was given a copy of the original prescription to schedule. Another copy of this prescription was also faxed to [facility].    POST OP CARE:instructions were reviewed including care of the wound and dressing after surgery and when he can shower.     PAIN MANAGEMENT: Flo Alejandra Mendez Jr. was also given his pain management regime, which includes the TENS unit given to him by jackelyn along with the education required for its use. He was also instructed regarding the Polar ice unit that will be in place after surgery and his postoperative pain medications.     PAIN MEDICATION:  Percocet 10/325mg 1 po q 4-6 hours prn pain  Ultram 50 mg Take 1-2 p.o. q.6 hours p.r.n. breakthrough pain,   Phenergan 25 mg one p.o. q.6 hours p.r.n. nausea and vomiting.    The patient will only require mechanical DVT prophylaxis with TEDs, SCDs, and early ambulation following surgery. Patient does NOT require aspirin for DVT prophylaxis.    This was discussed with the patient. The patient was questioned about their risk factors for developing DVTs including if there was any history of DVTs. The patient was asked if any specific recommendations were given from the doctor/s that did pre-operative surgical clearance. The patient was then  given an education sheet about DVTs and PE with warning signs and symptoms of both and steps to take if they suspect either of these.     Patient denies history of seizures.     This along with the Modified Caprini risk assessment model for VTE in general surgical patients was used to determine the patient's DVT risk.     From: Madeline AGGARWAL, Sukumar DA, Noemy SM, et al. Prevention of VTE in nonorthopedic surgical patients: antithrombotic therapy and prevention of thrombosis, 9th ed: American College of [employer]icians evidence-based clinical practical guidelines. Chest 2012; 141:e227S. Copyright © 2012. Reproduced with permission from the American College of Chest Physicians.    The patient was told that narcotic pain medications may make them drowsy and instructions were given to not sign legal documents, drive or operate heavy machinery, cars, or equipment while under the influence of narcotic medications. The patient was told and understands that narcotic pain medications should only be used as needed to control pain and that other options of pain control include TENs unit and ice packs/unit.     As there were no other questions to be asked, he was given my business card along with Susan Nguyen MD business card if he has any questions or concerns prior to surgery or in the postop period.

## 2018-12-18 NOTE — PLAN OF CARE
Flo Mendez Jr. has met all discharge criteria from Phase II. Vital Signs are stable, ambulating  without difficulty. Discharge instructions given, patient verbalized understanding. Discharged from facility via wheelchair in stable condition.

## 2018-12-18 NOTE — H&P (VIEW-ONLY)
Flo Mendez   is here for a completion of his perioperative paperwork. he  Is scheduled to undergo     left              A. Knee arthroscopic Anterior Cruciate Ligament reconstruction with patellar tendon autograft               B. Knee arthroscopic medial and lateral meniscectomy  possible meniscus repair - medial              C. Knee arthroscopic possible plica excision              D. Knee arthroscopic possible chondroplasty              E. Knee arthroscopic-assisted ligament supplementation with Artelon graft               F.  Knee intra-articular BioD arthroscopic assisted arthrocentesis of viable structural human allograft tissue matrix (BioDRestore) ([id number]) on 12/18/18      He is a healthy individual and does not need clearance for this procedure.     PAST MEDICAL HISTORY: History reviewed. No pertinent past medical history.  PAST SURGICAL HISTORY:   Past Surgical History:   Procedure Laterality Date    ANKLE SURGERY      ARTHROSCOPIC FEMOROPLASTY Right 2/11/2016    Performed by [facility] at [facility] OR    HIP SURGERY Right 2/11/2016    Dr Nguyen     INJECTION MAJOR JOINT Right 1/4/2016    Performed by Rahul Hinojosa MD at [facility] PAIN MGT    INJECTION-JOINT Right 8/12/2015    Performed by Govind Bynum MD at [facility] PAIN MGT    REPAIR-LIGAMENT-ANKLE Right 12/13/2016    [facility] at [facility] OR 1ST FLR    SYNOVECTOMY-HIP / C-ARM Right 2/11/2016    [facility] by [facility] at [facility] OR    TRIMMING-ACETABULAR RIM - to include capsular closure Right 2/11/2016    Performed by Susan Nguyen [facility] at [facility] OR    WISDOM TOOTH EXTRACTION       FAMILY HISTORY:   Family History   Problem Relation Age of Onset    No Known Problems Mother     No Known Problems Father      SOCIAL HISTORY:   Social History     Socioeconomic History    Marital status: Single     Spouse name: Not on file    Number of children: Not on file    Years of education: Not on file    Highest education level:  "Not on file   Social Needs    Financial resource strain: Not on file    Food insecurity - worry: Not on file    Food insecurity - inability: Not on file    Transportation needs - medical: Not on file    Transportation needs - non-medical: Not on file   Occupational History    Not on file   Tobacco Use    Smoking status: Current Some Day Smoker     Packs/day: 0.50     Years: 2.00     Pack years: 1.00     Types: Cigarettes    Smokeless tobacco: Never Used   Substance and Sexual Activity    Alcohol use: Yes     Alcohol/week: 0.0 oz     Comment: social     Drug use: No    Sexual activity: Not on file   Other Topics Concern    Not on file   Social History Narrative    Not on file       MEDICATIONS:   Current Outpatient Medications:     oxyCODONE-acetaminophen (PERCOCET)  mg per tablet, Take 1 tablet by mouth every 4-6 hours as needed for pain. Take stool softener with this medication., Disp: 28 tablet, Rfl: 0    promethazine (PHENERGAN) 25 MG tablet, Take 1 tablet (25 mg total) by mouth every 6 (six) hours as needed for Nausea., Disp: 16 tablet, Rfl: 0    traMADol (ULTRAM) 50 mg tablet, Take 1-2 tablets ( mg total) by mouth every 6 (six) hours as needed (surgical pain)., Disp: 28 tablet, Rfl: 0  No current facility-administered medications for this visit.     Facility-Administered Medications Ordered in Other Visits:     [START ON 12/18/2018] pregabalin capsule 150 mg, 150 mg, Oral, Once Pre-Op, Pieter Ford MD  ALLERGIES:   Review of patient's allergies indicates:   Allergen Reactions    Pcn [penicillins] Rash       VITAL SIGNS: BP (!) 145/75   Pulse 78   Ht 5' 7" (1.702 m)   Wt 77.1 kg (170 lb)   BMI 26.63 kg/m²      Risks, indications and benefits of the surgical procedure were discussed with the patient. All questions with regard to surgery, rehab, expected return to functional activities, activities of daily living and recreational endeavors were answered to his " satisfaction.    It was explained to the patient that there may be an increase in surgical risks if the patient has certain co-morbidities such as but not limited to: Obesity, Cardiovascular issues (CHF, CAD, Arrhythmias), chronic pulmonary issues, previous or current neurovascular/neurological issues, previous strokes, diabetes mellitus, previous wound healing issues, previous wound or skin infections, PVD, clotting disorders, if the patient uses chronic steroids, if the patient takes or has immune compromising medications or diseases, or has previously or currently used tobacco products.     The patient verbalized that he/she does not have any additional clotting, bleeding, or blood disorders, other than what is list in her chart on today's review.     Then a brief history and physical exam were performed.    Review of Systems   Constitution: Negative. Negative for chills, fever and night sweats.   HENT: Negative for congestion and headaches.    Eyes: Negative for blurred vision, left vision loss and right vision loss.   Cardiovascular: Negative for chest pain and syncope.   Respiratory: Negative for cough and shortness of breath.    Endocrine: Negative for polydipsia, polyphagia and polyuria.   Hematologic/Lymphatic: Negative for bleeding problem. Does not bruise/bleed easily.   Skin: Negative for dry skin, itching and rash.   Musculoskeletal: Negative for falls and muscle weakness.   Gastrointestinal: Negative for abdominal pain and bowel incontinence.   Genitourinary: Negative for bladder incontinence and nocturia.   Neurological: Negative for disturbances in coordination, loss of balance and seizures.   Psychiatric/Behavioral: Negative for depression. The patient does not have insomnia.    Allergic/Immunologic: Negative for hives and persistent infections.     PHYSICAL EXAM:  GEN: A&Ox3, WD WN NAD  HEENT: WNL  CHEST: CTAB, no W/R/R  HEART: RRR, no M/R/G  ABD: Soft, NT ND, BS x4 QUADS  MS; See Epic  NEURO: CN  II-XII intact       The surgical consent was then reviewed with the patient, who agreed with all the contents of the consent form and it was signed. he was then given the [facility] surgery packet to bring with him to [facility] for the anesthesia portion of his perioperative paperwork.   For all physicians except for Dr. Ivory, we will email and possibly fax [facility]    The patient was given the opportunity to ask questions about the surgical plan and consent form, and once no other questions were asked, I proceeded with the pre-op appointment.    PHYSICAL THERAPY:  He was also instructed regarding physical therapy and will begin on  POD1. He was given a copy of the original prescription to schedule. Another copy of this prescription was also faxed to [facility].    POST OP CARE:instructions were reviewed including care of the wound and dressing after surgery and when he can shower.     PAIN MANAGEMENT: Flo Alejandra Mendez Jr. was also given his pain management regime, which includes the TENS unit given to him by jackelyn along with the education required for its use. He was also instructed regarding the Polar ice unit that will be in place after surgery and his postoperative pain medications.     PAIN MEDICATION:  Percocet 10/325mg 1 po q 4-6 hours prn pain  Ultram 50 mg Take 1-2 p.o. q.6 hours p.r.n. breakthrough pain,   Phenergan 25 mg one p.o. q.6 hours p.r.n. nausea and vomiting.    The patient will only require mechanical DVT prophylaxis with TEDs, SCDs, and early ambulation following surgery. Patient does NOT require aspirin for DVT prophylaxis.    This was discussed with the patient. The patient was questioned about their risk factors for developing DVTs including if there was any history of DVTs. The patient was asked if any specific recommendations were given from the doctor/s that did pre-operative surgical clearance. The patient was then  given an education sheet about DVTs and PE with warning signs and symptoms of both and steps to take if they suspect either of these.     Patient denies history of seizures.     This along with the Modified Caprini risk assessment model for VTE in general surgical patients was used to determine the patient's DVT risk.     From: Madeline AGGARWAL, Sukumar DA, Noemy SM, et al. Prevention of VTE in nonorthopedic surgical patients: antithrombotic therapy and prevention of thrombosis, 9th ed: American College of Chest Physicians evidence-based clinical practical guidelines. Chest 2012; 141:e227S. Copyright © 2012. Reproduced with permission from the American College of Chest Physicians.    The patient was told that narcotic pain medications may make them drowsy and instructions were given to not sign legal documents, drive or operate heavy machinery, cars, or equipment while under the influence of narcotic medications. The patient was told and understands that narcotic pain medications should only be used as needed to control pain and that other options of pain control include TENs unit and ice packs/unit.     As there were no other questions to be asked, he was given my business card along with Susan Nguyen MD business card if he has any questions or concerns prior to surgery or in the postop period.

## 2018-12-18 NOTE — DISCHARGE SUMMARY
[facility]  Brief Operative Note     SUMMARY     Surgery Date: 12/18/2018     Surgeon(s) and Role:     * Susan Nguyen MD - Primary    Assisting Surgeon: None    Pre-op Diagnosis:  Rupture of anterior cruciate ligament of left knee, initial encounter [S83.512A]  Acute medial meniscus tear of left knee, initial encounter [S83.242A]  Acute lateral meniscus tear of left knee, initial encounter [S83.282A]  Internal derangement of left knee [M23.92]    Post-op Diagnosis:  Post-Op Diagnosis Codes:     * Rupture of anterior cruciate ligament of left knee, initial encounter [S83.512A]     * Acute medial meniscus tear of left knee, initial encounter [S83.242A]     * Acute lateral meniscus tear of left knee, initial encounter [S83.282A]     * Internal derangement of left knee [M23.92]    Procedure(s) (LRB):  ARTHROSCOPIC ACL RECONSTRUCTION WITH PATELLAR TENDON AUTOGRAFT (Left)  ARTHROSCOPY, KNEE, WITH  PARTIAL LATERAL MENISCECTOMY (Left)  REPAIR, ARTHROSCOPIC  MEDIAL MENISCUS, KNEE (Left)  PARTIAL SYNOVECTOMY, KNEE (Left)  CHONDROPLASTY, KNEE (Left)  INSERTION-IMPLANT, Artelon Graft Left Knee (Left)  EXCISION, PLICA, KNEE, ARTHROSCOPIC (Right)  OPEN PATELLAR TENDON REPAIR (Left)  AUTOLOGUS BONE GRAFTING TO PATELLAR AND TIBIAL HARVEST SITES (Left)    Anesthesia: General    Description of the findings of the procedure: Left knee acl reconstruction with artelon supplementation and medial meniscus repair    Findings/Key Components: Left knee acl reconstruction with artelon supplementation and medial meniscus repair    Estimated Blood Loss: minimal         Specimens:   Specimen (12h ago, onward)    None          Discharge Note    SUMMARY     Admit Date: 12/18/2018    Discharge Date and Time:  12/18/2018 12:01 PM    Hospital Course (synopsis of major diagnoses, care, treatment, and services provided during the course of the hospital stay): Patient underwent outpatient knee surgery and was transferred to PACU in  "stable condition.  In PACU, patient received appropriate post-operative care and discharged home with plans for physical therapy and follow-up with the operative surgeon.    Diet: Regular       Final Diagnosis: Post-Op Diagnosis Codes:     * Rupture of anterior cruciate ligament of left knee, initial encounter [S83.512A]     * Acute medial meniscus tear of left knee, initial encounter [S83.242A]     * Acute lateral meniscus tear of left knee, initial encounter [S83.282A]     * Internal derangement of left knee [M23.92]    Disposition: Home or Self Care    Follow Up/Patient Instructions:     Medications:  Reconciled Home Medications:      Medication List      CONTINUE taking these medications    oxyCODONE-acetaminophen  mg per tablet  Commonly known as:  PERCOCET  Take 1 tablet by mouth every 4-6 hours as needed for pain. Take stool softener with this medication.     promethazine 25 MG tablet  Commonly known as:  PHENERGAN  Take 1 tablet (25 mg total) by mouth every 6 (six) hours as needed for Nausea.     traMADol 50 mg tablet  Commonly known as:  ULTRAM  Take 1-2 tablets ( mg total) by mouth every 6 (six) hours as needed (surgical pain).          Discharge Procedure Orders   CRUTCHES FOR HOME USE   Order Comments: Provide if needed     Order Specific Question Answer Comments   Type: Axillary    Height: 5' 7" (1.702 m)    Weight: 77.1 kg (169 lb 16 oz)    Does patient have medical equipment at home? none    Length of need (1-99 months): 24      Diet general     Call MD for:  temperature >100.4     Call MD for:  persistent nausea and vomiting     Call MD for:  severe uncontrolled pain     Call MD for:  difficulty breathing, headache or visual disturbances     Call MD for:  redness, tenderness, or signs of infection (pain, swelling, redness, odor or green/yellow discharge around incision site)     Call MD for:  hives     Call MD for:  persistent dizziness or light-headedness     Keep surgical extremity " elevated     Ice to affected area     No driving, operating heavy equipment or signing legal documents while taking pain medication     Remove dressing in 72 hours     Shower on day dressing removed (No bath)     Weight bearing restrictions (specify)   Order Comments: Patient is to be (TTWB) toe-touch weight bearing on the surgical extremity when walking using crutches for 4-6 weeks after surgery.  Brace should be locked in extension at 0 degrees ROM (range of motion) when standing, sleeping, or walking with TTWB. Brace is to be worn at all times except for showering for 6 weeks. Brace can be unlocked when sitting with ROM set from 0-90 degrees.     TTWB is approximately 25% of weight on surgical extremity.     Follow-up Information     Go to Susan Nguyen MD.    Specialties:  Sports Medicine, Orthopedic Surgery  Why:  For wound re-check  Contact information:  [address]  [place] [address]  [phone]

## 2018-12-18 NOTE — DISCHARGE INSTRUCTIONS
Anesthesia: After Your Surgery  Youve just had surgery. During surgery, you received medication called anesthesia to keep you comfortable and pain-free. After surgery, you may experience some pain or nausea. This is common. Here are some tips for feeling better and recovering after surgery.    Going home  Your doctor or nurse will show you how to take care of yourself when you go home. He or she will also answer your questions. Have an adult family member or friend drive you home. For the first 24 hours after your surgery:  · Do not drive or use heavy equipment.  · Do not make important decisions or sign legal documents.  · Avoid alcohol.  · Have someone stay with you, if needed. He or she can watch for problems and help keep you safe.  Be sure to keep all follow-up appointments with your doctor. And rest after your procedure for as long as your doctor tells you to.    Coping with pain  If you have pain after surgery, pain medication will help you feel better. Take it as directed, before pain becomes severe. Also, ask your doctor or pharmacist about other ways to control pain, such as with heat, ice, and relaxation. And follow any other instructions your surgeon or nurse gives you.    URINARY RETENTION  Should you experience a decrease in your urine output or are unable to urinate following surgery, this can be due to the medications given during surgery.  We recommend you going to the nearest Emergency Department.    Tips for taking pain medication  To get the best relief possible, remember these points:  · Pain medications can upset your stomach. Taking them with a little food may help.  · Most pain relievers taken by mouth need at least 20 to 30 minutes to take effect.  · Taking medication on a schedule can help you remember to take it. Try to time your medication so that you can take it before beginning an activity, such as dressing, walking, or sitting down for dinner.  · Constipation is a common side effect  of pain medications. Contact your doctor before taking any medications like laxatives or stool softeners to help relieve constipation. Also ask about any dietary restrictions, because drinking lots of fluids and eating foods like fruits and vegetables that are high in fiber can also help. Remember, dont take laxatives unless your surgeon has prescribed them.  · Mixing alcohol and pain medication can cause dizziness and slow your breathing. It can even be fatal. Dont drink alcohol while taking pain medication.  · Pain medication can slow your reflexes. Dont drive or operate machinery while taking pain medication.  If your health care provider tells you to take acetaminophen to help relieve your pain, ask him or her how much you are supposed to take each day. (Acetaminophen is the generic name for Tylenol and other brand-name pain relievers.) Acetaminophen or other pain relievers may interact with your prescription medicines or other over-the-counter (OTC) drugs. Some prescription medications contain acetaminophen along with other active ingredients. Using both prescription and OTC acetaminophen for pain can cause you to overdose. The FDA recommends that you read the labels on your OTC medications carefully. This will help you to clearly understand the list of active ingredients, dosing instructions, and any warnings. It may also help you avoid taking too much acetaminophen. If you have questions or don't understand the information, ask your pharmacist or health care provider to explain it to you before you take the OTC medication.    Managing nausea  Some people have an upset stomach after surgery. This is often due to anesthesia, pain, pain medications, or the stress of surgery. The following tips will help you manage nausea and get good nutrition as you recover. If you were on a special diet before surgery, ask your doctor if you should follow it during recovery. These tips may help:  · Dont push yourself to  eat. Your body will tell you when to eat and how much.  · Start off with clear liquids and soup. They are easier to digest.  · Progress to semi-solid foods (mashed potatoes, applesauce, and gelatin) as you feel ready.  · Slowly move to solid foods. Dont eat fatty, rich, or spicy foods at first.  · Dont force yourself to have three large meals a day. Instead, eat smaller amounts more often.  · Take pain medications with a small amount of solid food, such as crackers or toast to avoid nausea.      Call your surgeon if    · You feel too sleepy, dizzy, or groggy (medication may be too strong).  · You have side effects like nausea, vomiting, or skin changes (rash, itching, or hives).   © 9432-4702 [employer] [address] [place] [address], [place] [address]. All rights reserved. This information is not intended as a substitute for professional medical care. Always follow your healthcare professional's instructions.

## 2018-12-18 NOTE — TRANSFER OF CARE
"Anesthesia Transfer of Care Note    Patient: Flo Mendez JrFreedom    Procedure(s) Performed: Procedure(s) (LRB):  ARTHROSCOPIC ACL RECONSTRUCTION WITH PATELLAR TENDON AUTOGRAFT (Left)  ARTHROSCOPY, KNEE, WITH  PARTIAL LATERAL MENISCECTOMY (Left)  REPAIR, ARTHROSCOPIC  MEDIAL MENISCUS, KNEE (Left)  PARTIAL SYNOVECTOMY, KNEE (Left)  CHONDROPLASTY, KNEE (Left)  INSERTION-IMPLANT, Artelon Graft Left Knee (Left)  EXCISION, PLICA, KNEE, ARTHROSCOPIC (Right)  OPEN PATELLAR TENDON REPAIR (Left)  AUTOLOGUS BONE GRAFTING TO PATELLAR AND TIBIAL HARVEST SITES (Left)    Patient location: PACU    Anesthesia Type: general    Transport from OR: Transported from OR on room air with adequate spontaneous ventilation    Post pain: adequate analgesia    Post assessment: no apparent anesthetic complications    Post vital signs: stable    Nausea/Vomiting: no nausea/vomiting    Complications: none    Transfer of care protocol was followed      Last vitals:   Visit Vitals  BP (!) 148/95 (BP Location: Right arm, Patient Position: Lying)   Pulse (!) 116   Temp 36.4 °C (97.6 °F) (Oral)   Resp 16   Ht 5' 7" (1.702 m)   Wt 77.1 kg (169 lb 16 oz)   SpO2 100%   BMI 26.63 kg/m²     "

## 2018-12-18 NOTE — ANESTHESIA PROCEDURE NOTES
Peripheral Block    Patient location during procedure: holding area   Block not for primary anesthetic.  Reason for block: at surgeon's request and post-op pain management   Post-op Pain Location: L Knee  Start time: 12/18/2018 8:12 AM  Timeout: 12/18/2018 8:12 AM   End time: 12/18/2018 8:18 AM  Staffing  Anesthesiologist: Jared Schultz MD  Performed: anesthesiologist   Preanesthetic Checklist  Completed: patient identified, site marked, surgical consent, pre-op evaluation, timeout performed, IV checked, risks and benefits discussed and monitors and equipment checked  Peripheral Block  Patient position: supine  Prep: ChloraPrep and site prepped and draped  Patient monitoring: heart rate, cardiac monitor, continuous pulse ox and frequent blood pressure checks  Block type: adductor canal  Laterality: left  Injection technique: single shot  Needle  Needle type: Stimuplex   Needle gauge: 22 G  Needle length: 3.5 in  Needle localization: anatomical landmarks, nerve stimulator and ultrasound guidance   -ultrasound image captured on disc.  Assessment  Injection assessment: negative aspiration, negative parasthesia and local visualized surrounding nerve  Paresthesia pain: none  Heart rate change: no  Slow fractionated injection: yes

## 2018-12-19 ENCOUNTER — CLINICAL SUPPORT (OUTPATIENT)
Dept: REHABILITATION | Facility: HOSPITAL | Age: 23
End: 2018-12-19
Payer: COMMERCIAL

## 2018-12-19 DIAGNOSIS — S83.512A RUPTURE OF ANTERIOR CRUCIATE LIGAMENT OF LEFT KNEE, INITIAL ENCOUNTER: ICD-10-CM

## 2018-12-19 DIAGNOSIS — S83.282A ACUTE LATERAL MENISCUS TEAR OF LEFT KNEE, INITIAL ENCOUNTER: ICD-10-CM

## 2018-12-19 DIAGNOSIS — S83.242A ACUTE MEDIAL MENISCUS TEAR OF LEFT KNEE, INITIAL ENCOUNTER: ICD-10-CM

## 2018-12-19 PROCEDURE — 97161 PT EVAL LOW COMPLEX 20 MIN: CPT

## 2018-12-19 PROCEDURE — 97110 THERAPEUTIC EXERCISES: CPT

## 2018-12-19 NOTE — PLAN OF CARE
[facility]  Physical Therapy Initial Evaluation    Name: Flo Mendez Jr.  Clinic Number: [id number]    Therapy Diagnosis: No diagnosis found.  Physician: Adrian Tovar III, *    Physician Orders: PT Eval and Treat    Medical Diagnosis:    S83.512A (ICD-10-CM) - Rupture of anterior cruciate ligament of left knee, initial encounter   S83.242A (ICD-10-CM) - Acute medial meniscus tear of left knee, initial encounter   S83.282A (ICD-10-CM) - Acute lateral meniscus tear of left knee, initial encounter       Date of Surgery: 12/18/18  Evaluation Date: 12/19/2018  Authorization Period Expiration: 12/31/18  Plan of Care Certification Period: 4/10/19  Visit # / Visits authorized: 1/ 5    Time In: 1000  Time Out: 1100  Total Billable Time: 50 minutes    Precautions: Standard  POSTOPERATIVE PLAN: We will be following the arthroscopic PT autograft meniscal repair guidelines. The patient will remain toe-touch weightbearing for 4 weeks. We discussed this with the patient's family after surgery.   The patient will remainin the brace for 6 weeks, brace locked at 0-0 for ambulation x 6 weeks.      Subjective   Date of onset: Pt originally injured left knee two years ago and has been ACL deficient since.  He recently describes a mis step that he believes hurt his meniscus in his left knee. He was seen by Dr. Nguyen for ACL reconstruction yesterday.   History of current condition - Flo reports: left knee pain x 2 years.  Originally injured playing soccer.  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  S/p ACL recontruction        No past medical history on file.  Flo Mendez Jr.  has a past surgical history that includes [place] tooth extraction; Hip surgery (Right, 2/11/2016); Ankle surgery; ARTHROSCOPIC ACL RECONSTRUCTION WITH PATELLAR TENDON AUTOGRAFT (Left, 12/18/2018); ARTHROSCOPY, KNEE, WITH  PARTIAL LATERAL MENISCECTOMY (Left, 12/18/2018); REPAIR, ARTHROSCOPIC  MEDIAL  MENISCUS, KNEE (Left, 12/18/2018); PARTIAL SYNOVECTOMY, KNEE (Left, 12/18/2018); CHONDROPLASTY, KNEE (Left, 12/18/2018); INSERTION-IMPLANT, Artelon Graft Left Knee (Left, 12/18/2018); EXCISION, PLICA, KNEE, ARTHROSCOPIC (Right, 12/18/2018); OPEN PATELLAR TENDON REPAIR (Left, 12/18/2018); AUTOLOGUS BONE GRAFTING TO PATELLAR AND TIBIAL HARVEST SITES (Left, 12/18/2018); REPAIR-LIGAMENT-ANKLE (Right, 12/13/2016); SYNOVECTOMY-HIP / C-ARM (Right, 2/11/2016); ARTHROSCOPIC FEMOROPLASTY (Right, 2/11/2016); TRIMMING-ACETABULAR RIM - to include capsular closure (Right, 2/11/2016); INJECTION MAJOR JOINT (Right, 1/4/2016); and INJECTION-JOINT (Right, 8/12/2015).    Flo has a current medication list which includes the following prescription(s): oxycodone-acetaminophen, promethazine, and tramadol.    Review of patient's allergies indicates:   Allergen Reactions    Pcn [penicillins] Rash        Imaging, MRI studies:  Complete ACL tear.    Bucket-handle tear of the posterior horn medial meniscus with displaced fragment anteromedially.    Grade I MCL sprain versus reactive periligamentous fluid.    Small knee joint effusion with popliteal cyst.    Prior Therapy: no   Social History:   lives alone, apartment, one flight of stairs   Occupation: IT   Prior Level of Function: independent  Current Level of Function: unable to perform certain ADLs and IADLs     Pain:  Current 5/10, worst 8/10, best 0/10   Location: left knee   Description: Aching, Dull, Sharp and Variable  Aggravating Factors: Sitting, Standing and Flexing  Easing Factors: ice and rest    Pts goals: return to running-straight line     Objective             Myotomes:   Myotome  RIGHT    Left     MUSCLE TEST  WNL  Dim.  Pain  WNL  Dim.  Pain    Shoulder Abduction (C5) x   x     Elbow Flex (C6) x   x     Wrist Ext (C7) x   x     Finger Flex (C8) x   x     Finger Abd (TI) x   x     Hip Flexion (L2-L3)  x   x     Knee Extension (L3-L4)  x   x     Dorsiflexion (L4)  x   x      Hallux Extension (L5)  x   x     Ankle Eversion (S1-S2)  x   x            DTR WNL  Dim.  Absent    Right Bicep x     Left Bicep x     Right Tricep x     Left Tricep x     Right Brachiorad x     Left Brachiorad x     Right-Quad  x     Left-Quad  x     Right Ankle  x     Left Ankle  x       Sensation:     Neurologicalc Screening- Sensory  Right    Left      LEVEL  WNL  Dim.  Absent  WNL  Dim.  Absent    L1 (inguinal area)  x   x     L2 (anterior mid-thigh)  x   x     L3 (distal anterior thigh)  x   x     L4 (medial lower leg/foot)  x   x     L5 (lateral leg/ foot)  x   x     S1 (lateral side of foot)  x   x                       Neurologicalc Screening- Sensory        LEVEL  WNL  Dim.  Absent  WNL  Dim.  Absent    C4 (Skin over AC jt) x   x     C5 (radial side of elbow) x   x     C6 (Dorsal surface of thumb) x   x     C7 (Dorsal 3rd digit) x   x     C8 (Dorsal 5th digit) x   x           ROM: Active/PROM                             Knee ROM  right  left    flexion     extension                 Strength:    Right knee strength is 5/5 grossly throughout   Left knee strength is 2/5- MMT was not performed due to post op     Special Tests: Not indicated at this time.      GAIT: Normal  Squat test: WNL   Single leg squat:WNL   SFMA Results: WNL   Thoracic spine rotational mobility:WNL             TREATMENT   Treatment Time In: 1030 am  Treatment Time Out: 1100am  Total Treatment time separate from Evaluation time:30     Flo received therapeutic exercises to develop strength, endurance and ROM for 10 minutes including:   quad sets 45x   Ankle pumps 45x          Flo participated in neuromuscular re-education activities to improve: motor control for 20 minutes. The following activities were included:  ESTIM w/ quad activation 20 min        Flo received cold pack for 10 minutes to to decrease circulation, pain, and swelling.       Home Exercises and Patient Education Provided    Education provided re: yes, heel slides      Written Home Exercises Provided: yes .  Exercises were reviewed and Flo was able to demonstrate them prior to the end of the session.   Pt received a written copy of exercises to perform at home. Flo demonstrated good  understanding of the education provided.     See EMR under patient instructions for exercises given.   Assessment   Flo is a 23 y.o. male referred to outpatient Physical Therapy with a medical diagnosis of left knee pain and is now s/p Left ACL reconstruction w/ medial meniscus reapair.  Pt was ACL deficient for 2 years and recently had a misstep a couple of months ago and had soreness in his knee since.   Pt presents with range of motion limitation, weakness, and pain in the left knee which is hindering ADLs and IADLs.     Pt prognosis is Excellent.   Pt will benefit from skilled outpatient Physical Therapy to address the deficits stated above and in the chart below, provide pt/family education, and to maximize pt's level of independence.     Plan of care discussed with patient: Yes  Pt's spiritual, cultural and educational needs considered and patient is agreeable to the plan of care and goals as stated below:     Anticipated Barriers for therapy: none     Medical Necessity is demonstrated by the following  History  Co-morbidities and personal factors that may impact the plan of care Co-morbidities:   none     Personal Factors:   no deficits     low   Examination  Body Structures and Functions, activity limitations and participation restrictions that may impact the plan of care Body Regions:   lower extremities    Body Systems:    gross symmetry  ROM  strength  balance  gait  motor control  motor learning    Participation Restrictions:   None     Activity limitations:   Learning and applying knowledge  no deficits    General Tasks and Commands  no deficits    Communication  no deficits    Mobility  lifting and carrying objects  walking  using transportation (bus, train, plane, car)  driving  (bike, car, motorcycle)    Self care  washing oneself (bathing, drying, washing hands)    Domestic Life  doing house work (cleaning house, washing dishes, laundry)    Interactions/Relationships  no deficits    Life Areas  no deficits    Community and Social Life  no deficits         low   Clinical Presentation stable and uncomplicated low   Decision Making/ Complexity Score: low     Goals:  Short Term GOALS:  In 4-8 weeks, pt. will:  1. Pt will increase ROM to the left knee to 120 degrees of flexion and 0 degrees in extension,  in order to perform ADLs and IADLs more effectively   2. Decrease overall pain to the left knee to 2-3/10,  on average with daily activities   3. Increase strength to the left knee to 4-/5 grossly throughout,  in order to perform ADLs and IADLs more effectively   4. Improve FOTO intake score to 65 to demonstrate improvement with functional mobility and use  5. Independent with HEP  Long Term GOALS:  In 8-12 weeks, pt. will:  1. Pt will increase ROM to the left knee to WNL,  in order to perform ADLs and IADLs more effectively   2. Decrease overall pain to the left knee to 0-2/10,  on average with daily activities   3. Increase strength to the left knee to 5/5 grossly throughout,  in order to perform ADLs and IADLs more effectively   4. Improve FOTO intake score to 75 to demonstrate improvement with functional mobility and use  5. Independent with HEP  6. Return to full personalized workouts unrestricted       Plan   Certification Period/Plan of care expiration: 12/19/2018 to 4/10/19.    Outpatient Physical Therapy 3 times weekly for 10 -16 weeks to include the following interventions: Aquatic Therapy, Gait Training, Manual Therapy, Moist Heat/ Ice, Neuromuscular Re-ed, Patient Education, Self Care, Therapeutic Activites and Therapeutic Exercise.     Ranjit Hurley, PT

## 2018-12-20 ENCOUNTER — TELEPHONE (OUTPATIENT)
Dept: SPORTS MEDICINE | Facility: CLINIC | Age: 23
End: 2018-12-20

## 2018-12-20 NOTE — TELEPHONE ENCOUNTER
Patient dropped off medical inquiry form for his job. Form completed and patient notified. He will pick it up when he comes to his PT appointment tomorrow (12/21). Form is at the  for him.    Geetha Lal MA  [facility]

## 2018-12-21 ENCOUNTER — CLINICAL SUPPORT (OUTPATIENT)
Dept: REHABILITATION | Facility: HOSPITAL | Age: 23
End: 2018-12-21
Payer: COMMERCIAL

## 2018-12-21 DIAGNOSIS — S83.242A ACUTE MEDIAL MENISCUS TEAR OF LEFT KNEE, INITIAL ENCOUNTER: ICD-10-CM

## 2018-12-21 DIAGNOSIS — S83.512A RUPTURE OF ANTERIOR CRUCIATE LIGAMENT OF LEFT KNEE, INITIAL ENCOUNTER: ICD-10-CM

## 2018-12-21 DIAGNOSIS — S83.282A ACUTE LATERAL MENISCUS TEAR OF LEFT KNEE, INITIAL ENCOUNTER: ICD-10-CM

## 2018-12-21 PROCEDURE — 97140 MANUAL THERAPY 1/> REGIONS: CPT

## 2018-12-21 PROCEDURE — 97110 THERAPEUTIC EXERCISES: CPT

## 2018-12-21 PROCEDURE — 97014 ELECTRIC STIMULATION THERAPY: CPT

## 2018-12-21 RX ORDER — OXYCODONE AND ACETAMINOPHEN 10; 325 MG/1; MG/1
TABLET ORAL
Qty: 28 TABLET | Refills: 0 | Status: SHIPPED | OUTPATIENT
Start: 2018-12-21 | End: 2018-12-24 | Stop reason: SDUPTHER

## 2018-12-21 NOTE — PROGRESS NOTES
"  Physical Therapy Daily Treatment Note     Name: Flo Mendez Jr.  Clinic Number: [id number]  Therapy Diagnosis: No diagnosis found.  Physician: Adrian Tovar III, *     Physician Orders: PT Eval and Treat    Medical Diagnosis:    S83.512A (ICD-10-CM) - Rupture of anterior cruciate ligament of left knee, initial encounter   S83.242A (ICD-10-CM) - Acute medial meniscus tear of left knee, initial encounter   S83.282A (ICD-10-CM) - Acute lateral meniscus tear of left knee, initial encounter         Date of Surgery: 12/18/18  Evaluation Date: 12/19/2018  Authorization Period Expiration: 12/31/18  Plan of Care Certification Period: 4/10/19  Visit # / Visits authorized: 2/ 5     Time In: 0930  Time Out:1030  Total Billable Time: 50 minutes    Subjective:  Pt reporting min pain in L knee when arrived for tx  Compliant with HEP and RICE  Pain level 3/10 and 0/10 after tx.       Objective     Flo received therapeutic exercises to develop strength, endurance, ROM, flexibility, posture and core stabilization for 15 minutes including:    SLR w/strap 2x10  QS CKC 3x10/3"    Flo received the following manual therapy techniques: Joint mobilizations and Soft tissue Mobilization were applied to the: L knee  for 15 minutes, including: patella mobs, passive flexion/AA extension (90-0) per protocol    E -stim - Russian 20' 10/10 for quad activation and TKE     Flo received cold pack for 10 minutes to to decrease circulation, pain, and swelling.    Home Exercises Provided and Patient Education Provided     Education provided:   Posture awareness    Written Home Exercises Provided: yes.  Exercises were reviewed and Flo was able to demonstrate them prior to the end of the session.  Flo demonstrated good  understanding of the education provided.       Assessment   Pt tolerating tx well - progressing with with L knee ROM per protocol. Poor quad strengthening and contraction requiring AA with strap for SLR. Cont to progress as " tolerated and per protocol.   Flo is progressing well towards his goals.   Pt prognosis is Good.     Pt will continue to benefit from skilled outpatient physical therapy to address the deficits listed in the problem list box on initial evaluation, provide pt/family education and to maximize pt's level of independence in the home and community environment.     Pt's spiritual, cultural and educational needs considered and pt agreeable to plan of care and goals.    Anticipated barriers to physical therapy: none     Goals: Short Term GOALS:  In 4-8 weeks, pt. will:  1. Pt will increase ROM to the left knee to 120 degrees of flexion and 0 degrees in extension,  in order to perform ADLs and IADLs more effectively (not met, progressing)  2. Decrease overall pain to the left knee to 2-3/10,  on average with daily activities  (not met, progressing)  3. Increase strength to the left knee to 4-/5 grossly throughout,  in order to perform ADLs and IADLs more effectively  (not met, progressing)  4. Improve FOTO intake score to 65 to demonstrate improvement with functional mobility and use (not met, progressing)  5. Independent with HEP (not met, progressing)  Long Term GOALS:  In 8-12 weeks, pt. will:  1. Pt will increase ROM to the left knee to WNL,  in order to perform ADLs and IADLs more effectively  (not met, progressing)  2. Decrease overall pain to the left knee to 0-2/10,  on average with daily activities  (not met, progressing)  3. Increase strength to the left knee to 5/5 grossly throughout,  in order to perform ADLs and IADLs more effectively  (not met, progressing)  4. Improve FOTO intake score to 75 to demonstrate improvement with functional mobility and use (not met, progressing)  5. Independent with HEP (not met, progressing)  6. Return to full personalized workouts unrestricted  (not met, progressing)         Plan     Cont with POC per Protocol    Rico Fragoso, PTA, STS

## 2018-12-24 DIAGNOSIS — S83.512A RUPTURE OF ANTERIOR CRUCIATE LIGAMENT OF LEFT KNEE, INITIAL ENCOUNTER: ICD-10-CM

## 2018-12-24 DIAGNOSIS — S83.282A ACUTE LATERAL MENISCUS TEAR OF LEFT KNEE, INITIAL ENCOUNTER: ICD-10-CM

## 2018-12-24 DIAGNOSIS — S83.242A ACUTE MEDIAL MENISCUS TEAR OF LEFT KNEE, INITIAL ENCOUNTER: ICD-10-CM

## 2018-12-24 NOTE — TELEPHONE ENCOUNTER
Spoke with pt to let him know I sent Girma his refill request and that Girma will not be back in the clinic until Wednesday, but may sign it before then

## 2018-12-26 ENCOUNTER — TELEPHONE (OUTPATIENT)
Dept: SPORTS MEDICINE | Facility: CLINIC | Age: 23
End: 2018-12-26

## 2018-12-26 ENCOUNTER — CLINICAL SUPPORT (OUTPATIENT)
Dept: REHABILITATION | Facility: HOSPITAL | Age: 23
End: 2018-12-26
Payer: COMMERCIAL

## 2018-12-26 ENCOUNTER — PATIENT MESSAGE (OUTPATIENT)
Dept: SPORTS MEDICINE | Facility: CLINIC | Age: 23
End: 2018-12-26

## 2018-12-26 DIAGNOSIS — S83.512A RUPTURE OF ANTERIOR CRUCIATE LIGAMENT OF LEFT KNEE, INITIAL ENCOUNTER: Primary | ICD-10-CM

## 2018-12-26 DIAGNOSIS — R26.9 ABNORMALITY OF GAIT: ICD-10-CM

## 2018-12-26 PROCEDURE — 97110 THERAPEUTIC EXERCISES: CPT

## 2018-12-26 RX ORDER — OXYCODONE AND ACETAMINOPHEN 10; 325 MG/1; MG/1
TABLET ORAL
Qty: 28 TABLET | Refills: 0 | Status: SHIPPED | OUTPATIENT
Start: 2018-12-26 | End: 2018-12-31 | Stop reason: SDUPTHER

## 2018-12-26 NOTE — TELEPHONE ENCOUNTER
Called pharmacy and they have confirmed that they received the percocet prescription that I called into today for patient

## 2018-12-28 ENCOUNTER — CLINICAL SUPPORT (OUTPATIENT)
Dept: REHABILITATION | Facility: HOSPITAL | Age: 23
End: 2018-12-28
Payer: COMMERCIAL

## 2018-12-28 PROCEDURE — 97110 THERAPEUTIC EXERCISES: CPT

## 2018-12-28 PROCEDURE — 97140 MANUAL THERAPY 1/> REGIONS: CPT

## 2018-12-28 NOTE — PROGRESS NOTES
"  Physical Therapy Daily Treatment Note     Name: Flo Mendez Jr.  Clinic Number: [id number]  Therapy Diagnosis: No diagnosis found.  Physician: Adrina Tovar III, *     Physician Orders: PT Eval and Treat    Medical Diagnosis:    S83.512A (ICD-10-CM) - Rupture of anterior cruciate ligament of left knee, initial encounter   S83.242A (ICD-10-CM) - Acute medial meniscus tear of left knee, initial encounter   S83.282A (ICD-10-CM) - Acute lateral meniscus tear of left knee, initial encounter         Date of Surgery: 12/18/18  Evaluation Date: 12/19/2018  Authorization Period Expiration: 12/31/18  Plan of Care Certification Period: 4/10/19  Visit # / Visits authorized: 3/ 5     Time In: 0935  Time Out:1030  Total Billable Time: 60 minutes    Subjective:  Pt reporting min pain in L knee when arrived for tx  Compliant with HEP and RICE  Pain level 2/10 and 0/10 after tx.       Objective     Flo received therapeutic exercises to develop strength, endurance, ROM, flexibility, posture and core stabilization for 35 minutes including:    Standing w/brace hip abd and hip ext 3x10  SLR w/brace 3x10  SL L hip abd w/brace 3x10  Prone L hip ext w/brace 3x10  QS CKC 3x10/3"  Passive knee flexion 90' 5'    Flo received the following manual therapy techniques: Joint mobilizations and Soft tissue Mobilization were applied to the: L knee  for 10 minutes, including: patella mobs, passive flexion/AA extension (90-0) per protocol    E -stim - Ivorian 15' 10/10 for quad activation and TKE and received cold pack for 15 minutes to to decrease circulation, pain, and swelling.    Home Exercises Provided and Patient Education Provided     Education provided:   Posture awareness    Written Home Exercises Provided: yes.  Exercises were reviewed and Flo was able to demonstrate them prior to the end of the session.  Flo demonstrated good  understanding of the education provided.       Assessment   Pt tolerating tx well - progressing " with with L knee ROM per protocol. Poor quad strengthening and contraction requiring AA with strap for SLR. Cont to progress as tolerated and per protocol.   Flo is progressing well towards his goals.   Pt prognosis is Good.     Pt will continue to benefit from skilled outpatient physical therapy to address the deficits listed in the problem list box on initial evaluation, provide pt/family education and to maximize pt's level of independence in the home and community environment.     Pt's spiritual, cultural and educational needs considered and pt agreeable to plan of care and goals.    Anticipated barriers to physical therapy: none     Goals: Short Term GOALS:  In 4-8 weeks, pt. will:  1. Pt will increase ROM to the left knee to 120 degrees of flexion and 0 degrees in extension,  in order to perform ADLs and IADLs more effectively (not met, progressing)  2. Decrease overall pain to the left knee to 2-3/10,  on average with daily activities  (not met, progressing)  3. Increase strength to the left knee to 4-/5 grossly throughout,  in order to perform ADLs and IADLs more effectively  (not met, progressing)  4. Improve FOTO intake score to 65 to demonstrate improvement with functional mobility and use (not met, progressing)  5. Independent with HEP (not met, progressing)  Long Term GOALS:  In 8-12 weeks, pt. will:  1. Pt will increase ROM to the left knee to WNL,  in order to perform ADLs and IADLs more effectively  (not met, progressing)  2. Decrease overall pain to the left knee to 0-2/10,  on average with daily activities  (not met, progressing)  3. Increase strength to the left knee to 5/5 grossly throughout,  in order to perform ADLs and IADLs more effectively  (not met, progressing)  4. Improve FOTO intake score to 75 to demonstrate improvement with functional mobility and use (not met, progressing)  5. Independent with HEP (not met, progressing)  6. Return to full personalized workouts unrestricted  (not met,  progressing)         Plan     Cont with POC per Protocol    Rico Fragoso, PTA, STS

## 2018-12-28 NOTE — PROGRESS NOTES
"  Physical Therapy Daily Treatment Note     Name: Flo Mendez Jr.  Clinic Number: [id number]  Therapy Diagnosis: No diagnosis found.  Physician: Adrian Toavr III, *     Physician Orders: PT Eval and Treat    Medical Diagnosis:    S83.512A (ICD-10-CM) - Rupture of anterior cruciate ligament of left knee, initial encounter   S83.242A (ICD-10-CM) - Acute medial meniscus tear of left knee, initial encounter   S83.282A (ICD-10-CM) - Acute lateral meniscus tear of left knee, initial encounter         Date of Surgery: 12/18/18  Evaluation Date: 12/19/2018  Authorization Period Expiration: 12/31/18  Plan of Care Certification Period: 4/10/19  Visit # / Visits authorized: 3/ 5     Time In: 300pm  Time Out:400pm   Total Billable Time: 50 minutes    Subjective:  Pt reporting min pain in L knee when arrived for tx  Compliant with HEP and RICE  Pain level 3/10 and 0/10 after tx.       Objective     Flo received therapeutic exercises to develop strength, endurance, ROM, flexibility, posture and core stabilization for 15 minutes including:  Quad sets 45x   SLR w/strap 2x10  QS CKC 3x10/3"  Prone TKE 3x15   Ankle pumps 45x   LLLD prone knee hand 8 min w/ 3lbs   Standing hip extension and abduction 30x each       Flo received the following manual therapy techniques: Joint mobilizations and Soft tissue Mobilization were applied to the: L knee  for 15 minutes, including: patella mobs, passive flexion/AA extension (90-0) per protocol      Flo received cold pack for 10 minutes to to decrease circulation, pain, and swelling.    Home Exercises Provided and Patient Education Provided     Education provided:   Posture awareness    Written Home Exercises Provided: yes.  Exercises were reviewed and Flo was able to demonstrate them prior to the end of the session.  Flo demonstrated good  understanding of the education provided.       Assessment   Pt tolerating tx well .  He was stiff into extension, however he was able to get " to 0 degrees after prolonged low load stretch.  Quad control improving and pt total ROM is 0-90 degrees.    Pt prognosis is Good.     Pt will continue to benefit from skilled outpatient physical therapy to address the deficits listed in the problem list box on initial evaluation, provide pt/family education and to maximize pt's level of independence in the home and community environment.     Pt's spiritual, cultural and educational needs considered and pt agreeable to plan of care and goals.    Anticipated barriers to physical therapy: none     Goals: Short Term GOALS:  In 4-8 weeks, pt. will:  1. Pt will increase ROM to the left knee to 120 degrees of flexion and 0 degrees in extension,  in order to perform ADLs and IADLs more effectively (not met, progressing)  2. Decrease overall pain to the left knee to 2-3/10,  on average with daily activities  (not met, progressing)  3. Increase strength to the left knee to 4-/5 grossly throughout,  in order to perform ADLs and IADLs more effectively  (not met, progressing)  4. Improve FOTO intake score to 65 to demonstrate improvement with functional mobility and use (not met, progressing)  5. Independent with HEP (not met, progressing)  Long Term GOALS:  In 8-12 weeks, pt. will:  1. Pt will increase ROM to the left knee to WNL,  in order to perform ADLs and IADLs more effectively  (not met, progressing)  2. Decrease overall pain to the left knee to 0-2/10,  on average with daily activities  (not met, progressing)  3. Increase strength to the left knee to 5/5 grossly throughout,  in order to perform ADLs and IADLs more effectively  (not met, progressing)  4. Improve FOTO intake score to 75 to demonstrate improvement with functional mobility and use (not met, progressing)  5. Independent with HEP (not met, progressing)  6. Return to full personalized workouts unrestricted  (not met, progressing)         Plan     Cont with POC per Protocol    Ranjit Hurley, PT, STS

## 2018-12-29 ENCOUNTER — PATIENT MESSAGE (OUTPATIENT)
Dept: SPORTS MEDICINE | Facility: CLINIC | Age: 23
End: 2018-12-29

## 2018-12-31 ENCOUNTER — TELEPHONE (OUTPATIENT)
Dept: SPORTS MEDICINE | Facility: CLINIC | Age: 23
End: 2018-12-31

## 2018-12-31 DIAGNOSIS — S83.242A ACUTE MEDIAL MENISCUS TEAR OF LEFT KNEE, INITIAL ENCOUNTER: ICD-10-CM

## 2018-12-31 DIAGNOSIS — S83.282A ACUTE LATERAL MENISCUS TEAR OF LEFT KNEE, INITIAL ENCOUNTER: ICD-10-CM

## 2018-12-31 DIAGNOSIS — S83.512A RUPTURE OF ANTERIOR CRUCIATE LIGAMENT OF LEFT KNEE, INITIAL ENCOUNTER: ICD-10-CM

## 2018-12-31 RX ORDER — OXYCODONE AND ACETAMINOPHEN 10; 325 MG/1; MG/1
TABLET ORAL
Qty: 28 TABLET | Refills: 0 | Status: SHIPPED | OUTPATIENT
Start: 2018-12-31 | End: 2019-02-12

## 2018-12-31 NOTE — TELEPHONE ENCOUNTER
Will refill pain medication now. I have told patient to begin weaning off the medications as possible.

## 2019-01-02 ENCOUNTER — OFFICE VISIT (OUTPATIENT)
Dept: SPORTS MEDICINE | Facility: CLINIC | Age: 24
End: 2019-01-02
Payer: COMMERCIAL

## 2019-01-02 ENCOUNTER — CLINICAL SUPPORT (OUTPATIENT)
Dept: REHABILITATION | Facility: HOSPITAL | Age: 24
End: 2019-01-02
Payer: COMMERCIAL

## 2019-01-02 ENCOUNTER — TELEPHONE (OUTPATIENT)
Dept: SPORTS MEDICINE | Facility: CLINIC | Age: 24
End: 2019-01-02

## 2019-01-02 VITALS
BODY MASS INDEX: 26.68 KG/M2 | DIASTOLIC BLOOD PRESSURE: 76 MMHG | HEART RATE: 84 BPM | HEIGHT: 67 IN | WEIGHT: 170 LBS | SYSTOLIC BLOOD PRESSURE: 141 MMHG

## 2019-01-02 DIAGNOSIS — S83.282A ACUTE LATERAL MENISCUS TEAR OF LEFT KNEE, INITIAL ENCOUNTER: ICD-10-CM

## 2019-01-02 DIAGNOSIS — Z98.890 S/P ACL RECONSTRUCTION: Primary | ICD-10-CM

## 2019-01-02 DIAGNOSIS — S83.512A RUPTURE OF ANTERIOR CRUCIATE LIGAMENT OF LEFT KNEE, INITIAL ENCOUNTER: ICD-10-CM

## 2019-01-02 DIAGNOSIS — S83.242A ACUTE MEDIAL MENISCUS TEAR OF LEFT KNEE, INITIAL ENCOUNTER: ICD-10-CM

## 2019-01-02 PROCEDURE — 97140 MANUAL THERAPY 1/> REGIONS: CPT

## 2019-01-02 PROCEDURE — 99999 PR PBB SHADOW E&M-EST. PATIENT-LVL III: CPT | Mod: PBBFAC,,, | Performed by: ORTHOPAEDIC SURGERY

## 2019-01-02 PROCEDURE — 99024 PR POST-OP FOLLOW-UP VISIT: ICD-10-PCS | Mod: S$GLB,,, | Performed by: ORTHOPAEDIC SURGERY

## 2019-01-02 PROCEDURE — 99024 POSTOP FOLLOW-UP VISIT: CPT | Mod: S$GLB,,, | Performed by: ORTHOPAEDIC SURGERY

## 2019-01-02 PROCEDURE — 97110 THERAPEUTIC EXERCISES: CPT

## 2019-01-02 PROCEDURE — 99999 PR PBB SHADOW E&M-EST. PATIENT-LVL III: ICD-10-PCS | Mod: PBBFAC,,, | Performed by: ORTHOPAEDIC SURGERY

## 2019-01-02 RX ORDER — TRAMADOL HYDROCHLORIDE 50 MG/1
50-100 TABLET ORAL EVERY 6 HOURS PRN
Qty: 28 TABLET | Refills: 0 | Status: SHIPPED | OUTPATIENT
Start: 2019-01-02 | End: 2019-01-11 | Stop reason: SDUPTHER

## 2019-01-02 NOTE — PROGRESS NOTES
"  Physical Therapy Daily Treatment Note     Name: Flo Mendez   Clinic Number: [id number]  Therapy Diagnosis: No diagnosis found.  Physician: Adrian Tovar III, *     Physician Orders: PT Eval and Treat    Medical Diagnosis:    S83.512A (ICD-10-CM) - Rupture of anterior cruciate ligament of left knee, initial encounter   S83.242A (ICD-10-CM) - Acute medial meniscus tear of left knee, initial encounter   S83.282A (ICD-10-CM) - Acute lateral meniscus tear of left knee, initial encounter         Date of Surgery: 12/18/18  Evaluation Date: 12/19/2018  Authorization Period Expiration: 12/31/18  Plan of Care Certification Period: 4/10/19  Visit # / Visits authorized: 4/ 5     Time In: 1300  Time Out:1400  Total Billable Time: 60 minutes    Subjective:  Pt reporting no pain in L knee when arrived for tx  Compliant with HEP and RICE  Pain level 0/10      Objective   Arrived with crutches and brace  Flo received therapeutic exercises to develop strength, endurance, ROM, flexibility, posture and core stabilization for 40 minutes including:    Standing w/brace hip abd and hip ext 3x10  SLR w/brace 3x10  SL L hip abd w/brace 3x10        QS CKC 3x10/3"  SLR w/o brace 3x10  Prone L hip ext w/o brace 3x10      Flo received the following manual therapy techniques: Joint mobilizations and Soft tissue Mobilization were applied to the: L knee  for 10 minutes, including: patella mobs, passive flexion/AA extension (90-0) per protocol     cold pack for 10 minutes to to decrease circulation, pain, and swelling.    Home Exercises Provided and Patient Education Provided     Education provided:   Posture awareness    Written Home Exercises Provided: yes.  Exercises were reviewed and Flo was able to demonstrate them prior to the end of the session.  Flo demonstrated good  understanding of the education provided.       Assessment   Pt tolerating tx well - progressing with with L knee ROM per protocol. Poor quad strengthening " and contraction requiring AA with strap for SLR. Cont to progress as tolerated and per protocol.   Flo is progressing well towards his goals.   Pt prognosis is Good.     Pt will continue to benefit from skilled outpatient physical therapy to address the deficits listed in the problem list box on initial evaluation, provide pt/family education and to maximize pt's level of independence in the home and community environment.     Pt's spiritual, cultural and educational needs considered and pt agreeable to plan of care and goals.    Anticipated barriers to physical therapy: none     Goals: Short Term GOALS:  In 4-8 weeks, pt. will:  1. Pt will increase ROM to the left knee to 120 degrees of flexion and 0 degrees in extension,  in order to perform ADLs and IADLs more effectively (not met, progressing)  2. Decrease overall pain to the left knee to 2-3/10,  on average with daily activities  (not met, progressing)  3. Increase strength to the left knee to 4-/5 grossly throughout,  in order to perform ADLs and IADLs more effectively  (not met, progressing)  4. Improve FOTO intake score to 65 to demonstrate improvement with functional mobility and use (not met, progressing)  5. Independent with HEP (not met, progressing)  Long Term GOALS:  In 8-12 weeks, pt. will:  1. Pt will increase ROM to the left knee to WNL,  in order to perform ADLs and IADLs more effectively  (not met, progressing)  2. Decrease overall pain to the left knee to 0-2/10,  on average with daily activities  (not met, progressing)  3. Increase strength to the left knee to 5/5 grossly throughout,  in order to perform ADLs and IADLs more effectively  (not met, progressing)  4. Improve FOTO intake score to 75 to demonstrate improvement with functional mobility and use (not met, progressing)  5. Independent with HEP (not met, progressing)  6. Return to full personalized workouts unrestricted  (not met, progressing)         Plan     Cont with POC per  Protocol    Rico Fragoso, PTA, STS

## 2019-01-02 NOTE — PROGRESS NOTES
HISTORY OF PRESENT ILLNESS:   Pt is here today for first post-operative followup of his knee arthroscopy.  he is doing well.  We have reviewed his findings and discussed plan of care and future treatment options.                Patient has been attending PT at the [facility] location, working with Benjamin  Patient notes that he has been taking Percocet daily    DATE OF PROCEDURE: 12/18/2018  PROCEDURE PERFORMED:   left  1. knee arthroscopic ACL reconstruction with patellar tendon autograft (CPT 47128)  2. Knee application of xenograft (Artelon tissue reinforcement) to ACL (CPT 31618)  3. knee arthroscopic partial synovectomy/debridement (CPT 60333).   4. knee arthroscopic plica excision (CPT 68605).   5. knee open patellar tendon repair  6. knee arthroscopic medial meniscus repair (CPT 95613) (complex, -22 modifier)  6. knee arthroscopic partial lateral meniscectomy (CPT 23269)   7. knee autologous bone grafting to patellar and tibial harvest sites  8. knee arthroscopic chondroplasty (CPT 66255)                    There was chondral damage to:  Medial femoral condyle 5 x 7 mm grade 1  Chondroplasty was performed using arthroscopic shaver.                    5 sutures were used in all for the meniscus repair                                     PHYSICAL EXAMINATION:     Incision sites healed well  No evidence of any erythema, infection or induration  Range of motion 0-90 degrees  Moderate effusion  2+ DP pulse  No swelling, no calf tenderness  - Kris's sign  Negative medial joint line tenderness  Moderate quad atrophy                                                                                 ASSESSMENT:                                                                                                                                               1. Status post above, doing well.                                                                                                                                PLAN:                                                                                                                                                     1. Continue with PT  2. Emphasized quad function.  3. I have discussed return to activity in detail.  4. he will see us back in 6 weeks.                                      5. All questions were answered and he should contact us if he  has any questions or concerns in the interim.    6. Refilled Tramadol, encouraged patient to wean off of Percocet and take Tramadol and Tylenol and wean off of that as soon as possible                                                         POSTOPERATIVE PLAN: We will be following the arthroscopic PT autograft meniscal repair guidelines. The patient will remain toe-touch weightbearing for 4 weeks. We discussed this with the patient's family after surgery.   The patient will remainin the brace for 6 weeks, brace locked at 0-0 for ambulation x 6 weeks.

## 2019-01-02 NOTE — TELEPHONE ENCOUNTER
Spoke with Pt regarding Rx sent on 12/31 by Girma Tovar. Pt stated he is picking up medication right now.

## 2019-01-04 ENCOUNTER — CLINICAL SUPPORT (OUTPATIENT)
Dept: REHABILITATION | Facility: HOSPITAL | Age: 24
End: 2019-01-04
Payer: COMMERCIAL

## 2019-01-04 DIAGNOSIS — R26.9 ABNORMALITY OF GAIT: Primary | ICD-10-CM

## 2019-01-04 DIAGNOSIS — M25.60 RANGE OF MOTION DEFICIT: ICD-10-CM

## 2019-01-04 PROCEDURE — 97140 MANUAL THERAPY 1/> REGIONS: CPT

## 2019-01-04 PROCEDURE — 97110 THERAPEUTIC EXERCISES: CPT

## 2019-01-04 NOTE — PROGRESS NOTES
"  Physical Therapy Daily Treatment Note     Name: Flo Mendez JrFreedom  Clinic Number: [id number]  Therapy Diagnosis: No diagnosis found.  Physician: Adrian Tovar III, *     Physician Orders: PT Eval and Treat    Medical Diagnosis:    S83.512A (ICD-10-CM) - Rupture of anterior cruciate ligament of left knee, initial encounter   S83.242A (ICD-10-CM) - Acute medial meniscus tear of left knee, initial encounter   S83.282A (ICD-10-CM) - Acute lateral meniscus tear of left knee, initial encounter         Date of Surgery: 12/18/18  Evaluation Date: 12/19/2018  Authorization Period Expiration: 12/31/18  Plan of Care Certification Period: 4/10/19  Visit # / Visits authorized: 13/25     Time In: 1300  Time Out:1400  Total Billable Time: 60 minutes    Subjective:  Pt reporting no pain in L knee when arrived for tx  Compliant with HEP and RICE  Pain level 0/10      Objective   Arrived with crutches and brace  Flo received therapeutic exercises to develop strength, endurance, ROM, flexibility, posture and core stabilization for 40 minutes including:    Standing w/brace hip abd and hip ext 3x10  SLR w/brace 3x10  SL L hip abd w/brace 3x10        QS CKC 3x10/3"  SLR w/o brace 3x10  Prone L hip ext w/o brace 3x10  Prone TKE 45x   Hamstrings isometrics 30x         Flo received the following manual therapy techniques: Joint mobilizations and Soft tissue Mobilization were applied to the: L knee  for 10 minutes, including: patella mobs, passive flexion/AA extension (90-0) per protocol     cold pack for 10 minutes to to decrease circulation, pain, and swelling.    Home Exercises Provided and Patient Education Provided     Education provided:   Posture awareness    Written Home Exercises Provided: yes.  Exercises were reviewed and Flo was able to demonstrate them prior to the end of the session.  Flo demonstrated good  understanding of the education provided.       Assessment   Pt tolerating tx well - progressing with with L " knee ROM per protocol.  Introduced prone TKEs and heel raises.  Straight leg raise was performed with good quad control.  Also initiated light HS isometrics.  Overall progressing well.  Range of motion is 0-90 degrees today, however his knee flexion barrier does feel soft.    Flo is progressing well towards his goals.   Pt prognosis is Good.     Pt will continue to benefit from skilled outpatient physical therapy to address the deficits listed in the problem list box on initial evaluation, provide pt/family education and to maximize pt's level of independence in the home and community environment.     Pt's spiritual, cultural and educational needs considered and pt agreeable to plan of care and goals.    Anticipated barriers to physical therapy: none     Goals: Short Term GOALS:  In 4-8 weeks, pt. will:  1. Pt will increase ROM to the left knee to 120 degrees of flexion and 0 degrees in extension,  in order to perform ADLs and IADLs more effectively (not met, progressing)  2. Decrease overall pain to the left knee to 2-3/10,  on average with daily activities  (not met, progressing)  3. Increase strength to the left knee to 4-/5 grossly throughout,  in order to perform ADLs and IADLs more effectively  (not met, progressing)  4. Improve FOTO intake score to 65 to demonstrate improvement with functional mobility and use (not met, progressing)  5. Independent with HEP (not met, progressing)  Long Term GOALS:  In 8-12 weeks, pt. will:  1. Pt will increase ROM to the left knee to WNL,  in order to perform ADLs and IADLs more effectively  (not met, progressing)  2. Decrease overall pain to the left knee to 0-2/10,  on average with daily activities  (not met, progressing)  3. Increase strength to the left knee to 5/5 grossly throughout,  in order to perform ADLs and IADLs more effectively  (not met, progressing)  4. Improve FOTO intake score to 75 to demonstrate improvement with functional mobility and use (not met,  progressing)  5. Independent with HEP (not met, progressing)  6. Return to full personalized workouts unrestricted  (not met, progressing)         Plan     Cont with POC per Protocol    Ranjit Hurley, PT,

## 2019-01-08 ENCOUNTER — CLINICAL SUPPORT (OUTPATIENT)
Dept: REHABILITATION | Facility: HOSPITAL | Age: 24
End: 2019-01-08
Payer: COMMERCIAL

## 2019-01-08 DIAGNOSIS — M25.60 RANGE OF MOTION DEFICIT: Primary | ICD-10-CM

## 2019-01-08 DIAGNOSIS — R53.1 WEAKNESS: ICD-10-CM

## 2019-01-08 PROCEDURE — 97110 THERAPEUTIC EXERCISES: CPT

## 2019-01-08 PROCEDURE — 97140 MANUAL THERAPY 1/> REGIONS: CPT

## 2019-01-08 NOTE — PROGRESS NOTES
"  Physical Therapy Daily Treatment Note     Name: Flo Mendez JrFreedom  Clinic Number: [id number]  Therapy Diagnosis: No diagnosis found.  Physician: Adrian Tovar III, *     Physician Orders: PT Eval and Treat    Medical Diagnosis:    S83.512A (ICD-10-CM) - Rupture of anterior cruciate ligament of left knee, initial encounter   S83.242A (ICD-10-CM) - Acute medial meniscus tear of left knee, initial encounter   S83.282A (ICD-10-CM) - Acute lateral meniscus tear of left knee, initial encounter         Date of Surgery: 12/18/18  Evaluation Date: 12/19/2018  Authorization Period Expiration: 12/31/18  Plan of Care Certification Period: 4/10/19  Visit # / Visits authorized: 14/25     Time In: 1500  Time Out:1400  Total Billable Time: 60 minutes    Subjective:  Pt reporting no pain in L knee when arrived for tx  Compliant with HEP and RICE  Pain level 3/10 on average     Objective   Arrived with crutches and brace  Flo received therapeutic exercises to develop strength, endurance, ROM, flexibility, posture and core stabilization for 40 minutes including:    Standing w/brace hip abd and hip ext 3x10  SLR w/brace 3x10  SL L hip abd w/brace 3x10        QS CKC 3x10/3"  Standing hip abduction and extension standing on right LE 30x each   SLR w/o brace 3x10  Prone L hip ext w/o brace 3x10  Prone TKE 45x   Hamstrings isometrics 30x         Flo received the following manual therapy techniques: Joint mobilizations and Soft tissue Mobilization were applied to the: L knee  for 10 minutes, including: patella mobs, passive flexion/AA extension (90-0) per protocol     cold pack for 10 minutes to to decrease circulation, pain, and swelling.    Home Exercises Provided and Patient Education Provided     Education provided:   Posture awareness    Written Home Exercises Provided: yes.  Exercises were reviewed and Flo was able to demonstrate them prior to the end of the session.  Flo demonstrated good  understanding of the education " provided.       Assessment   Pt tolerating tx well - progressing with with L knee ROM per protocol.  Pt requested stretches to promote knee extension and he received two stretches for low load long duration type stretches.  Pt does present with residual swelling in the left knee which is normal at this time.  Pt states that he's icing 3x per day.  Overall states that he's feeling better.  Continues to have difficulties with ADLs and IADLs and is unable to drive at this time.  Scar looks well healed.     Flo is progressing well towards his goals.   Pt prognosis is Good.     Pt will continue to benefit from skilled outpatient physical therapy to address the deficits listed in the problem list box on initial evaluation, provide pt/family education and to maximize pt's level of independence in the home and community environment.     Pt's spiritual, cultural and educational needs considered and pt agreeable to plan of care and goals.    Anticipated barriers to physical therapy: none     Goals: Short Term GOALS:  In 4-8 weeks, pt. will:  1. Pt will increase ROM to the left knee to 120 degrees of flexion and 0 degrees in extension,  in order to perform ADLs and IADLs more effectively (not met, progressing)  2. Decrease overall pain to the left knee to 2-3/10,  on average with daily activities  (not met, progressing)  3. Increase strength to the left knee to 4-/5 grossly throughout,  in order to perform ADLs and IADLs more effectively  (not met, progressing)  4. Improve FOTO intake score to 65 to demonstrate improvement with functional mobility and use (not met, progressing)  5. Independent with HEP (not met, progressing)  Long Term GOALS:  In 8-12 weeks, pt. will:  1. Pt will increase ROM to the left knee to WNL,  in order to perform ADLs and IADLs more effectively  (not met, progressing)  2. Decrease overall pain to the left knee to 0-2/10,  on average with daily activities  (not met, progressing)  3. Increase strength  to the left knee to 5/5 grossly throughout,  in order to perform ADLs and IADLs more effectively  (not met, progressing)  4. Improve FOTO intake score to 75 to demonstrate improvement with functional mobility and use (not met, progressing)  5. Independent with HEP (not met, progressing)  6. Return to full personalized workouts unrestricted  (not met, progressing)         Plan     Cont with POC per Protocol and progress exercises as planned.     Ranjit Hurley, PT,

## 2019-01-10 ENCOUNTER — CLINICAL SUPPORT (OUTPATIENT)
Dept: REHABILITATION | Facility: HOSPITAL | Age: 24
End: 2019-01-10
Payer: COMMERCIAL

## 2019-01-10 DIAGNOSIS — R53.1 WEAKNESS: ICD-10-CM

## 2019-01-10 PROCEDURE — 97140 MANUAL THERAPY 1/> REGIONS: CPT

## 2019-01-10 PROCEDURE — 97110 THERAPEUTIC EXERCISES: CPT

## 2019-01-10 NOTE — PROGRESS NOTES
"  Physical Therapy Daily Treatment Note     Name: Flo Mendez JrFreedom  Clinic Number: [id number]  Therapy Diagnosis: No diagnosis found.  Physician: Adrian Tovar III, *     Physician Orders: PT Eval and Treat    Medical Diagnosis:    S83.512A (ICD-10-CM) - Rupture of anterior cruciate ligament of left knee, initial encounter   S83.242A (ICD-10-CM) - Acute medial meniscus tear of left knee, initial encounter   S83.282A (ICD-10-CM) - Acute lateral meniscus tear of left knee, initial encounter         Date of Surgery: 12/18/18  Evaluation Date: 12/19/2018  Authorization Period Expiration: 12/31/18  Plan of Care Certification Period: 4/10/19  Visit # / Visits authorized: 14/25     Time In: 1530  Time Out:1630  Total Billable Time: 50 minutes    Subjective     Pt reporting no pain in L knee when arrived for tx  Compliant with HEP and RICE  Pain level 0/10     Objective   Arrived with crutches and brace  Flo received therapeutic exercises to develop strength, endurance, ROM, flexibility, posture and core stabilization for 40 minutes including:    Standing w/brace hip abd and hip ext 3x10 2#  SLR w/brace 30x 2#  SL L hip abd w/brace 2# 30x        QS CKC 3x10/3"  SLR w/o brace 3x10  Prone L hip ext w/o brace 3x10  Prone TKE 45x   Hamstrings isometrics 30x     Flo received the following manual therapy techniques: Joint mobilizations and Soft tissue Mobilization were applied to the: L knee  for 10 minutes, including: patella mobs, LLLD flexor stretch      cold pack for 10 minutes to to decrease circulation, pain, and swelling.    Home Exercises Provided and Patient Education Provided     Education provided:   Posture awareness    Written Home Exercises Provided: yes.  Exercises were reviewed and Flo was able to demonstrate them prior to the end of the session.  Flo demonstrated good  understanding of the education provided.       Assessment   Pt tolerating tx well - progressing with with L knee ROM per protocol. " VC/TC for correcting form/technique with therex. Continue with LLLD flexor stretch L knee.  Flo is progressing well towards his goals.   Pt prognosis is Good.     Pt will continue to benefit from skilled outpatient physical therapy to address the deficits listed in the problem list box on initial evaluation, provide pt/family education and to maximize pt's level of independence in the home and community environment.     Pt's spiritual, cultural and educational needs considered and pt agreeable to plan of care and goals.    Anticipated barriers to physical therapy: none     Goals: Short Term GOALS:  In 4-8 weeks, pt. will:  1. Pt will increase ROM to the left knee to 120 degrees of flexion and 0 degrees in extension,  in order to perform ADLs and IADLs more effectively (not met, progressing)  2. Decrease overall pain to the left knee to 2-3/10,  on average with daily activities  (not met, progressing)  3. Increase strength to the left knee to 4-/5 grossly throughout,  in order to perform ADLs and IADLs more effectively  (not met, progressing)  4. Improve FOTO intake score to 65 to demonstrate improvement with functional mobility and use (not met, progressing)  5. Independent with HEP (not met, progressing)  Long Term GOALS:  In 8-12 weeks, pt. will:  1. Pt will increase ROM to the left knee to WNL,  in order to perform ADLs and IADLs more effectively  (not met, progressing)  2. Decrease overall pain to the left knee to 0-2/10,  on average with daily activities  (not met, progressing)  3. Increase strength to the left knee to 5/5 grossly throughout,  in order to perform ADLs and IADLs more effectively  (not met, progressing)  4. Improve FOTO intake score to 75 to demonstrate improvement with functional mobility and use (not met, progressing)  5. Independent with HEP (not met, progressing)  6. Return to full personalized workouts unrestricted  (not met, progressing)         Plan     Cont with POC per Protocol and  progress exercises as planned.     Rico Fragoso, PTA, STS

## 2019-01-11 DIAGNOSIS — S83.242A ACUTE MEDIAL MENISCUS TEAR OF LEFT KNEE, INITIAL ENCOUNTER: ICD-10-CM

## 2019-01-11 DIAGNOSIS — S83.282A ACUTE LATERAL MENISCUS TEAR OF LEFT KNEE, INITIAL ENCOUNTER: ICD-10-CM

## 2019-01-11 DIAGNOSIS — S83.512A RUPTURE OF ANTERIOR CRUCIATE LIGAMENT OF LEFT KNEE, INITIAL ENCOUNTER: ICD-10-CM

## 2019-01-11 RX ORDER — TRAMADOL HYDROCHLORIDE 50 MG/1
50 TABLET ORAL EVERY 6 HOURS PRN
Qty: 20 TABLET | Refills: 0 | Status: SHIPPED | OUTPATIENT
Start: 2019-01-11 | End: 2019-01-21 | Stop reason: SDUPTHER

## 2019-01-14 ENCOUNTER — TELEPHONE (OUTPATIENT)
Dept: SPORTS MEDICINE | Facility: CLINIC | Age: 24
End: 2019-01-14

## 2019-01-14 NOTE — TELEPHONE ENCOUNTER
Received STD forms from [employer]. Forms completed and faxed back to them at [phone].    [facility]

## 2019-01-16 ENCOUNTER — CLINICAL SUPPORT (OUTPATIENT)
Dept: REHABILITATION | Facility: HOSPITAL | Age: 24
End: 2019-01-16
Payer: COMMERCIAL

## 2019-01-16 DIAGNOSIS — R53.1 WEAKNESS: ICD-10-CM

## 2019-01-16 DIAGNOSIS — M25.60 RANGE OF MOTION DEFICIT: Primary | ICD-10-CM

## 2019-01-16 DIAGNOSIS — R26.9 ABNORMALITY OF GAIT: ICD-10-CM

## 2019-01-16 PROCEDURE — 97110 THERAPEUTIC EXERCISES: CPT

## 2019-01-16 PROCEDURE — 97140 MANUAL THERAPY 1/> REGIONS: CPT

## 2019-01-17 ENCOUNTER — CLINICAL SUPPORT (OUTPATIENT)
Dept: REHABILITATION | Facility: HOSPITAL | Age: 24
End: 2019-01-17
Payer: COMMERCIAL

## 2019-01-17 DIAGNOSIS — R53.1 WEAKNESS: ICD-10-CM

## 2019-01-17 PROCEDURE — 97110 THERAPEUTIC EXERCISES: CPT

## 2019-01-17 PROCEDURE — 97140 MANUAL THERAPY 1/> REGIONS: CPT

## 2019-01-17 NOTE — PROGRESS NOTES
"  Physical Therapy Daily Treatment Note     Name: Flo Mendez Jr.  Clinic Number: [id number]  Therapy Diagnosis: No diagnosis found.  Physician: Adrian Tovar III, *     Physician Orders: PT Eval and Treat    Medical Diagnosis:    S83.512A (ICD-10-CM) - Rupture of anterior cruciate ligament of left knee, initial encounter   S83.242A (ICD-10-CM) - Acute medial meniscus tear of left knee, initial encounter   S83.282A (ICD-10-CM) - Acute lateral meniscus tear of left knee, initial encounter         Date of Surgery: 12/18/18  Evaluation Date: 12/19/2018  Authorization Period Expiration: 12/31/18  Plan of Care Certification Period: 4/10/19  Visit # / Visits authorized: 10/25     Time In: 1450  Time Out:1550  Total Billable Time: 50 minutes    Subjective     Pt reporting min medial knee pain with TKE   Compliant with HEP and RICE  Pain level 2/10     Objective   Arrived with crutches and brace  Flo received therapeutic exercises to develop strength, endurance, ROM, flexibility, posture and core stabilization for 40 minutes including:    Standing w/brace hip abd and hip ext 3x10 2#  SLR w/brace 30x 2#  SL L hip abd w/brace 2# 30x  L quad roller 5'        SLR w/o brace 3x10        QS CKC 3x10/3"      Flo received the following manual therapy techniques: Joint mobilizations and Soft tissue Mobilization were applied to the: L knee  for 10 minutes, including: patella mobs, LLLD flexor stretch, GSS      cold pack for 10 minutes to to decrease circulation, pain, and swelling.    Home Exercises Provided and Patient Education Provided     Education provided:   Posture awareness    Written Home Exercises Provided: yes.  Exercises were reviewed and Flo was able to demonstrate them prior to the end of the session.  Flo demonstrated good  understanding of the education provided.       Assessment   Pt tolerating tx well. Improved L quad control and activation.  VC/TC for correcting form/technique with therex. Continue " with LLLD flexor stretch L knee forTKE.   Flo is progressing well towards his goals.   Pt prognosis is Good.     Pt will continue to benefit from skilled outpatient physical therapy to address the deficits listed in the problem list box on initial evaluation, provide pt/family education and to maximize pt's level of independence in the home and community environment.     Pt's spiritual, cultural and educational needs considered and pt agreeable to plan of care and goals.    Anticipated barriers to physical therapy: none     Goals: Short Term GOALS:  In 4-8 weeks, pt. will:  1. Pt will increase ROM to the left knee to 120 degrees of flexion and 0 degrees in extension,  in order to perform ADLs and IADLs more effectively (not met, progressing)  2. Decrease overall pain to the left knee to 2-3/10,  on average with daily activities  (not met, progressing)  3. Increase strength to the left knee to 4-/5 grossly throughout,  in order to perform ADLs and IADLs more effectively  (not met, progressing)  4. Improve FOTO intake score to 65 to demonstrate improvement with functional mobility and use (not met, progressing)  5. Independent with HEP (not met, progressing)  Long Term GOALS:  In 8-12 weeks, pt. will:  1. Pt will increase ROM to the left knee to WNL,  in order to perform ADLs and IADLs more effectively  (not met, progressing)  2. Decrease overall pain to the left knee to 0-2/10,  on average with daily activities  (not met, progressing)  3. Increase strength to the left knee to 5/5 grossly throughout,  in order to perform ADLs and IADLs more effectively  (not met, progressing)  4. Improve FOTO intake score to 75 to demonstrate improvement with functional mobility and use (not met, progressing)  5. Independent with HEP (not met, progressing)  6. Return to full personalized workouts unrestricted  (not met, progressing)         Plan     Cont with POC per Protocol and progress exercises as planned.     Rico Fragoso,  PTA, STS

## 2019-01-20 NOTE — PROGRESS NOTES
"  Physical Therapy Daily Treatment Note     Name: Flo Mendez JrFreedom  Clinic Number: [id number]  Therapy Diagnosis: No diagnosis found.  Physician: Adrian Tovar III, *     Physician Orders: PT Eval and Treat    Medical Diagnosis:    S83.512A (ICD-10-CM) - Rupture of anterior cruciate ligament of left knee, initial encounter   S83.242A (ICD-10-CM) - Acute medial meniscus tear of left knee, initial encounter   S83.282A (ICD-10-CM) - Acute lateral meniscus tear of left knee, initial encounter         Date of Surgery: 12/18/18  Evaluation Date: 12/19/2018  Authorization Period Expiration: 12/31/18  Plan of Care Certification Period: 4/10/19  Visit # / Visits authorized: 15/25     Time In: 1500  Time Out:1600  Total Billable Time: 50 minutes    Subjective     Pt reporting no pain in L knee when arrived for tx  Compliant with HEP and RICE  Pain level 0/10     Objective   Arrived with crutches and brace  Flo received therapeutic exercises to develop strength, endurance, ROM, flexibility, posture and core stabilization for 40 minutes including:    Standing w/brace hip abd and hip ext 3x10 2#  SLR w/brace 30x 2#  SL L hip abd w/brace 2# 30x        QS CKC 3x10/3"  SLR w/o brace 3x10  Prone L hip ext w/o brace 3x10  Prone TKE 45x   Quad isometricss at EOT in 60 deg flex 5x30 MRE  Hamstrings isometrics 30x     Flo received the following manual therapy techniques: Joint mobilizations and Soft tissue Mobilization were applied to the: L knee  for 10 minutes, including: patella mobs, LLLD flexor stretch      cold pack for 10 minutes to to decrease circulation, pain, and swelling.    Home Exercises Provided and Patient Education Provided     Education provided:   Posture awareness    Written Home Exercises Provided: yes.  Exercises were reviewed and Flo was able to demonstrate them prior to the end of the session.  Flo demonstrated good  understanding of the education provided.       Assessment   Pt tolerating tx well - " progressing with with L knee ROM per protocol. VC/TC for correcting form/technique with therex. Continue with LLLD flexor stretch L knee for full knee extension.  We can initiate TKE in standing on next visit.  Overall progressing well.  I have communicated with the PTA regarding plan of care.    Flo is progressing well towards his goals.   Pt prognosis is Good.     Pt will continue to benefit from skilled outpatient physical therapy to address the deficits listed in the problem list box on initial evaluation, provide pt/family education and to maximize pt's level of independence in the home and community environment.     Pt's spiritual, cultural and educational needs considered and pt agreeable to plan of care and goals.    Anticipated barriers to physical therapy: none     Goals: Short Term GOALS:  In 4-8 weeks, pt. will:  1. Pt will increase ROM to the left knee to 120 degrees of flexion and 0 degrees in extension,  in order to perform ADLs and IADLs more effectively (not met, progressing)  2. Decrease overall pain to the left knee to 2-3/10,  on average with daily activities  (not met, progressing)  3. Increase strength to the left knee to 4-/5 grossly throughout,  in order to perform ADLs and IADLs more effectively  (not met, progressing)  4. Improve FOTO intake score to 65 to demonstrate improvement with functional mobility and use (not met, progressing)  5. Independent with HEP (not met, progressing)  Long Term GOALS:  In 8-12 weeks, pt. will:  1. Pt will increase ROM to the left knee to WNL,  in order to perform ADLs and IADLs more effectively  (not met, progressing)  2. Decrease overall pain to the left knee to 0-2/10,  on average with daily activities  (not met, progressing)  3. Increase strength to the left knee to 5/5 grossly throughout,  in order to perform ADLs and IADLs more effectively  (not met, progressing)  4. Improve FOTO intake score to 75 to demonstrate improvement with functional mobility and  use (not met, progressing)  5. Independent with HEP (not met, progressing)  6. Return to full personalized workouts unrestricted  (not met, progressing)         Plan     Cont with POC per Protocol and progress exercises as planned.     Ranjit Hurley, PT, STS

## 2019-01-21 DIAGNOSIS — S83.242A ACUTE MEDIAL MENISCUS TEAR OF LEFT KNEE, INITIAL ENCOUNTER: ICD-10-CM

## 2019-01-21 DIAGNOSIS — S83.512A RUPTURE OF ANTERIOR CRUCIATE LIGAMENT OF LEFT KNEE, INITIAL ENCOUNTER: ICD-10-CM

## 2019-01-21 DIAGNOSIS — S83.282A ACUTE LATERAL MENISCUS TEAR OF LEFT KNEE, INITIAL ENCOUNTER: ICD-10-CM

## 2019-01-21 RX ORDER — TRAMADOL HYDROCHLORIDE 50 MG/1
50 TABLET ORAL EVERY 6 HOURS PRN
Qty: 20 TABLET | Refills: 0 | Status: SHIPPED | OUTPATIENT
Start: 2019-01-21 | End: 2019-02-12

## 2019-01-22 ENCOUNTER — CLINICAL SUPPORT (OUTPATIENT)
Dept: REHABILITATION | Facility: HOSPITAL | Age: 24
End: 2019-01-22
Payer: COMMERCIAL

## 2019-01-22 DIAGNOSIS — R26.9 ABNORMALITY OF GAIT: ICD-10-CM

## 2019-01-22 DIAGNOSIS — M25.60 RANGE OF MOTION DEFICIT: Primary | ICD-10-CM

## 2019-01-22 DIAGNOSIS — R53.1 WEAKNESS: ICD-10-CM

## 2019-01-22 PROCEDURE — 97140 MANUAL THERAPY 1/> REGIONS: CPT

## 2019-01-22 PROCEDURE — 97110 THERAPEUTIC EXERCISES: CPT

## 2019-01-22 NOTE — PROGRESS NOTES
"  Physical Therapy Daily Treatment Note     Name: Flo Mendez Jr.  Clinic Number: [id number]  Therapy Diagnosis: No diagnosis found.  Physician: Adrian Tovar III, *     Physician Orders: PT Eval and Treat    Medical Diagnosis:    S83.512A (ICD-10-CM) - Rupture of anterior cruciate ligament of left knee, initial encounter   S83.242A (ICD-10-CM) - Acute medial meniscus tear of left knee, initial encounter   S83.282A (ICD-10-CM) - Acute lateral meniscus tear of left knee, initial encounter         Date of Surgery: 12/18/18  Evaluation Date: 12/19/2018  Authorization Period Expiration: 12/31/18  Plan of Care Certification Period: 4/10/19  Visit # / Visits authorized: 10/25     Time In: 300pm  Time Out:400pm  Total Billable Time: 50 minutes    Subjective     Pt reporting min pain at this time.  Ambulating with axillary crutches and knee brace locked as prescribed   Compliant with HEP and RICE  Pain level 2/10     Objective   Arrived with crutches and brace  Flo received therapeutic exercises to develop strength, endurance, ROM, flexibility, posture and core stabilization for 40 minutes including:    Standing w/brace hip abd and hip ext 3x10 2#  SLR w/brace 30x 2#  SL L hip abd w/brace 2# 30x  L quad roller 5'        SLR w/o brace 3x10        QS CKC 3x10/3"        Isometric quad sets at 40 deg at EOT 5x45 sec w/5lbs         Bridge on small red physioball 30x       Flo received the following manual therapy techniques: Joint mobilizations and Soft tissue Mobilization were applied to the: L knee  for 10 minutes, including: patella mobs, LLLD flexor stretch, GSS      cold pack for 10 minutes to to decrease circulation, pain, and swelling.    Home Exercises Provided and Patient Education Provided     Education provided:   Posture awareness    Written Home Exercises Provided: yes.  Exercises were reviewed and Flo was able to demonstrate them prior to the end of the session.  Flo demonstrated good  understanding " of the education provided.       Assessment   Pt tolerating tx well. Improved L quad control and activation.  We continued LLLD stretch for end range extension as he still presents with a 5 degree deficit.  Introduced bridging, quad isometrics, which were tolerated well.    Flo is progressing well towards his goals.   Pt prognosis is Good.     Pt will continue to benefit from skilled outpatient physical therapy to address the deficits listed in the problem list box on initial evaluation, provide pt/family education and to maximize pt's level of independence in the home and community environment.     Pt's spiritual, cultural and educational needs considered and pt agreeable to plan of care and goals.    Anticipated barriers to physical therapy: none     Goals: Short Term GOALS:  In 4-8 weeks, pt. will:  1. Pt will increase ROM to the left knee to 120 degrees of flexion and 0 degrees in extension,  in order to perform ADLs and IADLs more effectively (not met, progressing)  2. Decrease overall pain to the left knee to 2-3/10,  on average with daily activities  (not met, progressing)  3. Increase strength to the left knee to 4-/5 grossly throughout,  in order to perform ADLs and IADLs more effectively  (not met, progressing)  4. Improve FOTO intake score to 65 to demonstrate improvement with functional mobility and use (not met, progressing)  5. Independent with HEP (not met, progressing)  Long Term GOALS:  In 8-12 weeks, pt. will:  1. Pt will increase ROM to the left knee to WNL,  in order to perform ADLs and IADLs more effectively  (not met, progressing)  2. Decrease overall pain to the left knee to 0-2/10,  on average with daily activities  (not met, progressing)  3. Increase strength to the left knee to 5/5 grossly throughout,  in order to perform ADLs and IADLs more effectively  (not met, progressing)  4. Improve FOTO intake score to 75 to demonstrate improvement with functional mobility and use (not met,  progressing)  5. Independent with HEP (not met, progressing)  6. Return to full personalized workouts unrestricted  (not met, progressing)         Plan     Cont with POC per Protocol and progress exercises as planned.     Ranjit Hurley, PT, STS

## 2019-01-24 ENCOUNTER — CLINICAL SUPPORT (OUTPATIENT)
Dept: REHABILITATION | Facility: HOSPITAL | Age: 24
End: 2019-01-24
Payer: COMMERCIAL

## 2019-01-24 DIAGNOSIS — R53.1 WEAKNESS: ICD-10-CM

## 2019-01-24 PROCEDURE — 97140 MANUAL THERAPY 1/> REGIONS: CPT

## 2019-01-24 PROCEDURE — 97110 THERAPEUTIC EXERCISES: CPT

## 2019-01-24 NOTE — PROGRESS NOTES
"  Physical Therapy Daily Treatment Note     Name: Flo Mendez Jr.  Clinic Number: [id number]  Therapy Diagnosis: No diagnosis found.  Physician: Adrian Tovar III, *     Physician Orders: PT Eval and Treat    Medical Diagnosis:    S83.512A (ICD-10-CM) - Rupture of anterior cruciate ligament of left knee, initial encounter   S83.242A (ICD-10-CM) - Acute medial meniscus tear of left knee, initial encounter   S83.282A (ICD-10-CM) - Acute lateral meniscus tear of left knee, initial encounter         Date of Surgery: 12/18/18  Evaluation Date: 12/19/2018  Authorization Period Expiration: 12/31/18  Plan of Care Certification Period: 4/10/19  Visit # / Visits authorized: 10/25     Time In: 1525  Time Out:1625  Total Billable Time: 50 minutes    Subjective     Pt reporting no pain at this time.  Ambulating with axillary crutches WBAT   Compliant with HEP and RICE  Pain level 0/10     Objective   Arrived with crutches and brace  Flo received therapeutic exercises to develop strength, endurance, ROM, flexibility, posture and core stabilization for 40 minutes including:    Standing w/brace hip abd and hip ext 3x10 3#  SLR w/brace 30x 3#  SL L hip abd w/brace 3# 30x  Stationary bike x 10' for increased ROM w/ brace  L quad roller 5'      SLR w/o brace 3x10     QS CKC 3x10/3"     Bridge on small red physioball 30x       Flo received the following manual therapy techniques: Joint mobilizations and Soft tissue Mobilization were applied to the: L knee  for 10 minutes, including: patella mobs,  passive Prone knee flexion with gentle stretch end range      cold pack for 10 minutes to to decrease circulation, pain, and swelling.    Home Exercises Provided and Patient Education Provided     Education provided:   Posture awareness    Written Home Exercises Provided: yes.  Exercises were reviewed and Flo was able to demonstrate them prior to the end of the session.  Flo demonstrated good  understanding of the education " provided.       Assessment   Pt tolerating tx well. Improved L quad control and activation.  VC/TC for correcting form/technique with therex.   Flo is progressing well towards his goals.   Pt prognosis is Good.     Pt will continue to benefit from skilled outpatient physical therapy to address the deficits listed in the problem list box on initial evaluation, provide pt/family education and to maximize pt's level of independence in the home and community environment.     Pt's spiritual, cultural and educational needs considered and pt agreeable to plan of care and goals.    Anticipated barriers to physical therapy: none     Goals: Short Term GOALS:  In 4-8 weeks, pt. will:  1. Pt will increase ROM to the left knee to 120 degrees of flexion and 0 degrees in extension,  in order to perform ADLs and IADLs more effectively (not met, progressing)  2. Decrease overall pain to the left knee to 2-3/10,  on average with daily activities  (not met, progressing)  3. Increase strength to the left knee to 4-/5 grossly throughout,  in order to perform ADLs and IADLs more effectively  (not met, progressing)  4. Improve FOTO intake score to 65 to demonstrate improvement with functional mobility and use (not met, progressing)  5. Independent with HEP (not met, progressing)  Long Term GOALS:  In 8-12 weeks, pt. will:  1. Pt will increase ROM to the left knee to WNL,  in order to perform ADLs and IADLs more effectively  (not met, progressing)  2. Decrease overall pain to the left knee to 0-2/10,  on average with daily activities  (not met, progressing)  3. Increase strength to the left knee to 5/5 grossly throughout,  in order to perform ADLs and IADLs more effectively  (not met, progressing)  4. Improve FOTO intake score to 75 to demonstrate improvement with functional mobility and use (not met, progressing)  5. Independent with HEP (not met, progressing)  6. Return to full personalized workouts unrestricted  (not met,  progressing)         Plan     Cont with POC per Protocol and progress exercises as planned.     Rico Fragoso, PTA, STS

## 2019-01-31 ENCOUNTER — CLINICAL SUPPORT (OUTPATIENT)
Dept: REHABILITATION | Facility: HOSPITAL | Age: 24
End: 2019-01-31
Payer: COMMERCIAL

## 2019-01-31 DIAGNOSIS — R53.1 WEAKNESS: ICD-10-CM

## 2019-01-31 PROCEDURE — 97140 MANUAL THERAPY 1/> REGIONS: CPT

## 2019-01-31 PROCEDURE — 97110 THERAPEUTIC EXERCISES: CPT

## 2019-01-31 NOTE — PROGRESS NOTES
"  Physical Therapy Daily Treatment Note     Name: Flo Mendez JrFreedom  Clinic Number: [id number]  Therapy Diagnosis: No diagnosis found.  Physician: Adrian Tovar III, *     Physician Orders: PT Eval and Treat    Medical Diagnosis:    S83.512A (ICD-10-CM) - Rupture of anterior cruciate ligament of left knee, initial encounter   S83.242A (ICD-10-CM) - Acute medial meniscus tear of left knee, initial encounter   S83.282A (ICD-10-CM) - Acute lateral meniscus tear of left knee, initial encounter         Date of Surgery: 12/18/18  Evaluation Date: 12/19/2018  Authorization Period Expiration: 12/31/18  Plan of Care Certification Period: 4/10/19  Visit # / Visits authorized: 11/25     Time In: 1525  Time Out:1625  Total Billable Time: 50 minutes    Subjective     Pt reporting no pain at this time.  Ambulating with axillary crutches WBAT, returned back to work today.  Compliant with HEP and RICE  Pain level 0/10     Objective   Arrived with crutches and brace  Flo received therapeutic exercises to develop strength, endurance, ROM, flexibility, posture and core stabilization for 35 minutes including:    Stationary bike x 10' for increased ROM w/ brace  QS CKC 3x10/3"  Prone knee hang 5'/5#  Bridges on TB 30x   SLR L 3x10  SL L hip abd 3x10         Flo received the following manual therapy techniques: Joint mobilizations and Soft tissue Mobilization were applied to the: L knee  for 15 minutes, including: patella mobs,  passive knee flexion, knee extension overpressure, Hss, Gss     cold pack for 10 minutes to to decrease circulation, pain, and swelling.    Home Exercises Provided and Patient Education Provided     Education provided:   Posture awareness    Written Home Exercises Provided: yes.  Exercises were reviewed and Flo was able to demonstrate them prior to the end of the session.  Flo demonstrated good  understanding of the education provided.       Assessment   Pt tolerating tx well. L knee PROM ext 0' after " tx.  VC/TC for correcting form/technique with therex.   Flo is progressing well towards his goals.   Pt prognosis is Good.     Pt will continue to benefit from skilled outpatient physical therapy to address the deficits listed in the problem list box on initial evaluation, provide pt/family education and to maximize pt's level of independence in the home and community environment.     Pt's spiritual, cultural and educational needs considered and pt agreeable to plan of care and goals.    Anticipated barriers to physical therapy: none     Goals: Short Term GOALS:  In 4-8 weeks, pt. will:  1. Pt will increase ROM to the left knee to 120 degrees of flexion and 0 degrees in extension,  in order to perform ADLs and IADLs more effectively (not met, progressing)  2. Decrease overall pain to the left knee to 2-3/10,  on average with daily activities  (not met, progressing)  3. Increase strength to the left knee to 4-/5 grossly throughout,  in order to perform ADLs and IADLs more effectively  (not met, progressing)  4. Improve FOTO intake score to 65 to demonstrate improvement with functional mobility and use (not met, progressing)  5. Independent with HEP (not met, progressing)  Long Term GOALS:  In 8-12 weeks, pt. will:  1. Pt will increase ROM to the left knee to WNL,  in order to perform ADLs and IADLs more effectively  (not met, progressing)  2. Decrease overall pain to the left knee to 0-2/10,  on average with daily activities  (not met, progressing)  3. Increase strength to the left knee to 5/5 grossly throughout,  in order to perform ADLs and IADLs more effectively  (not met, progressing)  4. Improve FOTO intake score to 75 to demonstrate improvement with functional mobility and use (not met, progressing)  5. Independent with HEP (not met, progressing)  6. Return to full personalized workouts unrestricted  (not met, progressing)         Plan     Cont with POC per Protocol and progress exercises as planned.     Rico  P Fouzia, PTA, STS

## 2019-02-05 ENCOUNTER — CLINICAL SUPPORT (OUTPATIENT)
Dept: REHABILITATION | Facility: HOSPITAL | Age: 24
End: 2019-02-05
Payer: COMMERCIAL

## 2019-02-05 DIAGNOSIS — R53.1 WEAKNESS: Primary | ICD-10-CM

## 2019-02-05 DIAGNOSIS — R26.9 ABNORMALITY OF GAIT: ICD-10-CM

## 2019-02-05 PROCEDURE — 97140 MANUAL THERAPY 1/> REGIONS: CPT

## 2019-02-05 PROCEDURE — 97110 THERAPEUTIC EXERCISES: CPT

## 2019-02-08 ENCOUNTER — CLINICAL SUPPORT (OUTPATIENT)
Dept: REHABILITATION | Facility: HOSPITAL | Age: 24
End: 2019-02-08
Payer: COMMERCIAL

## 2019-02-08 DIAGNOSIS — R53.1 WEAKNESS: ICD-10-CM

## 2019-02-08 PROCEDURE — 97113 AQUATIC THERAPY/EXERCISES: CPT

## 2019-02-08 NOTE — PROGRESS NOTES
Physical Therapy Daily Treatment Note     Name: Flo Mendez Jr.  Clinic Number: [id number]    Therapy Diagnosis: No diagnosis found.  Physician: Adrian Tovar III, *    Visit Date: 2/8/2019  Physician Orders: PT Eval and Treat    Medical Diagnosis:    S83.512A (ICD-10-CM) - Rupture of anterior cruciate ligament of left knee, initial encounter   S83.242A (ICD-10-CM) - Acute medial meniscus tear of left knee, initial encounter   S83.282A (ICD-10-CM) - Acute lateral meniscus tear of left knee, initial encounter         Date of Surgery: 12/18/18  Evaluation Date: 12/19/2018  Authorization Period Expiration: 12/31/18  Plan of Care Certification Period: 4/10/19  Visit # / Visits authorized: 12/25  Aquatic total visits: 1  Total visits:13  Time In: 1455  Time Out: 1555  Total Billable Time: 60 minutes    Precautions: Standard and Fall; no running in pool as of 2/8/2019    Subjective     Pt reports: L knee increased pain since out of brace 2/5/2019  He was compliant with home exercise program.  Response to previous treatment: 1st pool treatment  Functional change: out of brace since 2/5/2019    Pain: 2/10  Location: left knee      Objective     Flo received aquatic therapeutic exercises to develop strength, ROM and flexibility for 60 minutes including:    WARMUP x 2 laps each  Walk fwd/back/side    STRETCHES 3 x 20sec  L HSS    LE EX x 20  Mini squats with QS  Single leg squat  Heel raise with GS  Hip abd/add,   Hip flex/ext  Laq with hip flex  HS with hip ext  Lunges fwd/lat   Wonderboard seated for propelling fwd/back using LEs  Walking // bars with focus on hell strike/toe off & cocontraction during stance phase of gait x 2 laps  Monstor walks x 2 laps  Squat walks laterally x 2 laps    COOL DOWN x 1 lap each  Walk fwd/back/side    AROM L knee flexion prior to getting in pool 70 degrees  AROM L  Knee after getting out of pool: 75 degrees  Home Exercises Provided and Patient Education Provided     Education  provided:   Role of aquatic therapy  Ice L knee after therapy    Written Home Exercises Provided: Patient instructed to cont prior HEP.  Exercises were reviewed and Flo was able to demonstrate them prior to the end of the session.  Flo demonstrated good  understanding of the education provided.     See EMR under Media for exercises provided prior visit.    Assessment   Flo did well with all exercises & only required few cues for technique.Great effort with all exrciseL   Flo is progressing well towards his goals.benefitting from aquatic therapy as evidenced by 5 degrees increase in Left knee flexion   Pt prognosis is Good.     Pt will continue to benefit from skilled aquatic outpatient physical therapy to address the deficits listed in the problem list box on initial evaluation, provide pt/family education and to maximize pt's level of independence in the home and community environment.     Pt's spiritual, cultural and educational needs considered and pt agreeable to plan of care and goals.    Anticipated barriers to physical therapy: none    Goals:  In 4-8 weeks, pt. will:  1. Pt will increase ROM to the left knee to 120 degrees of flexion and 0 degrees in extension,  in order to perform ADLs and IADLs more effectively   2. Decrease overall pain to the left knee to 2-3/10,  on average with daily activities   3. Increase strength to the left knee to 4-/5 grossly throughout,  in order to perform ADLs and IADLs more effectively   4. Improve FOTO intake score to 65 to demonstrate improvement with functional mobility and use  5. Independent with HEP  Long Term GOALS:  In 8-12 weeks, pt. will:  1. Pt will increase ROM to the left knee to WNL,  in order to perform ADLs and IADLs more effectively   2. Decrease overall pain to the left knee to 0-2/10,  on average with daily activities   3. Increase strength to the left knee to 5/5 grossly throughout,  in order to perform ADLs and IADLs more effectively   4. Improve FOTO  intake score to 75 to demonstrate improvement with functional mobility and use  5. Independent with HEP  6. Return to full personalized workouts unrestricted       Plan     Outpatient Physical Therapy 3 times weekly for 10 -16 weeks to include the following interventions: Aquatic Therapy, Gait Training, Manual Therapy, Moist Heat/ Ice, Neuromuscular Re-ed, Patient Education, Self Care, Therapeutic Activites and Therapeutic Exercise.      Patient may be seen by PTA as part of rehabilitation team      Beth Marshall, PT

## 2019-02-12 ENCOUNTER — OFFICE VISIT (OUTPATIENT)
Dept: URGENT CARE | Facility: CLINIC | Age: 24
End: 2019-02-12
Payer: COMMERCIAL

## 2019-02-12 VITALS
BODY MASS INDEX: 26.68 KG/M2 | HEART RATE: 89 BPM | HEIGHT: 67 IN | TEMPERATURE: 98 F | WEIGHT: 170 LBS | SYSTOLIC BLOOD PRESSURE: 110 MMHG | OXYGEN SATURATION: 95 % | DIASTOLIC BLOOD PRESSURE: 72 MMHG | RESPIRATION RATE: 18 BRPM

## 2019-02-12 DIAGNOSIS — R68.89 FLU-LIKE SYMPTOMS: Primary | ICD-10-CM

## 2019-02-12 DIAGNOSIS — J06.9 UPPER RESPIRATORY TRACT INFECTION, UNSPECIFIED TYPE: ICD-10-CM

## 2019-02-12 LAB
CTP QC/QA: YES
FLUAV AG NPH QL: NEGATIVE
FLUBV AG NPH QL: NEGATIVE

## 2019-02-12 PROCEDURE — 87804 POCT INFLUENZA A/B: ICD-10-PCS | Mod: QW,S$GLB,, | Performed by: FAMILY MEDICINE

## 2019-02-12 PROCEDURE — 99214 PR OFFICE/OUTPT VISIT, EST, LEVL IV, 30-39 MIN: ICD-10-PCS | Mod: S$GLB,,, | Performed by: FAMILY MEDICINE

## 2019-02-12 PROCEDURE — 3008F PR BODY MASS INDEX (BMI) DOCUMENTED: ICD-10-PCS | Mod: CPTII,S$GLB,, | Performed by: FAMILY MEDICINE

## 2019-02-12 PROCEDURE — 3008F BODY MASS INDEX DOCD: CPT | Mod: CPTII,S$GLB,, | Performed by: FAMILY MEDICINE

## 2019-02-12 PROCEDURE — 99214 OFFICE O/P EST MOD 30 MIN: CPT | Mod: S$GLB,,, | Performed by: FAMILY MEDICINE

## 2019-02-12 PROCEDURE — 87804 INFLUENZA ASSAY W/OPTIC: CPT | Mod: QW,S$GLB,, | Performed by: FAMILY MEDICINE

## 2019-02-13 NOTE — PROGRESS NOTES
"Subjective:       Patient ID: Flo Mendez Jr. is a 23 y.o. male.    Vitals:  height is 5' 7" (1.702 m) and weight is 77.1 kg (170 lb). His temperature is 98.2 °F (36.8 °C). His blood pressure is 110/72 and his pulse is 89. His respiration is 18 and oxygen saturation is 95%.     Chief Complaint: Sinus Problem    Patient presents with c/o sinus pressure, and congestion x 1 week sore throat and a cough that started Sunday       Sinus Problem   This is a new problem. The problem has been gradually worsening since onset. There has been no fever. He is experiencing no pain. Associated symptoms include congestion, coughing and sinus pressure. Pertinent negatives include no chills, diaphoresis, ear pain, shortness of breath or sore throat. Past treatments include nothing. The treatment provided no relief.       Constitution: Negative for chills, sweating, fatigue and fever.   HENT: Positive for congestion, postnasal drip, sinus pain and sinus pressure. Negative for ear pain, sore throat and voice change.    Neck: Negative for painful lymph nodes.   Eyes: Negative for eye redness.   Respiratory: Positive for cough. Negative for chest tightness, sputum production, bloody sputum, COPD, shortness of breath, stridor, wheezing and asthma.    Gastrointestinal: Negative for nausea and vomiting.   Musculoskeletal: Negative for muscle ache.   Skin: Negative for rash.   Allergic/Immunologic: Negative for seasonal allergies and asthma.   Hematologic/Lymphatic: Negative for swollen lymph nodes.       Objective:      Physical Exam   Constitutional: He appears well-developed and well-nourished.   HENT:   Head: Normocephalic and atraumatic.   Right Ear: External ear normal.   Left Ear: External ear normal.   Mild erythema of pharynx, Turbinate edema and congestion   Eyes: Conjunctivae and EOM are normal. Pupils are equal, round, and reactive to light.   Neck: Normal range of motion. Neck supple. No thyromegaly present. "   Cardiovascular: Normal rate, regular rhythm, normal heart sounds and intact distal pulses.   Pulmonary/Chest: Effort normal and breath sounds normal.   Lymphadenopathy:     He has no cervical adenopathy.   Psychiatric: He has a normal mood and affect. His behavior is normal. Judgment and thought content normal.   Vitals reviewed.      Assessment:       1. Flu-like symptoms    2. Upper respiratory tract infection, unspecified type        Plan:         Flu-like symptoms  -     POCT Influenza A/B    Upper respiratory tract infection, unspecified type

## 2019-02-15 ENCOUNTER — DOCUMENTATION ONLY (OUTPATIENT)
Dept: REHABILITATION | Facility: HOSPITAL | Age: 24
End: 2019-02-15

## 2019-02-16 NOTE — PROGRESS NOTES
"  Physical Therapy Daily Treatment Note     Name: Flo Mendez .  Clinic Number: [id number]  Therapy Diagnosis: No diagnosis found.  Physician: Adrian Tovar III, *     Physician Orders: PT Eval and Treat    Medical Diagnosis:    S83.512A (ICD-10-CM) - Rupture of anterior cruciate ligament of left knee, initial encounter   S83.242A (ICD-10-CM) - Acute medial meniscus tear of left knee, initial encounter   S83.282A (ICD-10-CM) - Acute lateral meniscus tear of left knee, initial encounter         Date of Surgery: 12/18/18  Evaluation Date: 12/19/2018  Authorization Period Expiration: 12/31/18  Plan of Care Certification Period: 4/10/19  Visit # / Visits authorized: 12/25     Time In: 300  Time Out:400  Total Billable Time: 50 minutes    Subjective     Pt reporting no pain at this time.  Ambulating without crutches and without knee brace.  Has started new job and is having some difficulties with his schedule.    Compliant with HEP and RICE  Pain level 0/10     Objective   Arrived with crutches and brace  Flo received therapeutic exercises to develop strength, endurance, ROM, flexibility, posture and core stabilization for 35 minutes including:    Stationary bike x 10' for increased ROM w/ brace  QS CKC 3x10/3"  Prone knee hang 5'/5#  Bridges on TB 30x   SLR L 3x10 3lbs   SL L hip abd 3x10 3lbs   Squats, BW 0-60 degrees 45x   Calf raise 45x            Flo received the following manual therapy techniques: Joint mobilizations and Soft tissue Mobilization were applied to the: L knee  for 15 minutes, including: patella mobs,  passive knee flexion, knee extension overpressure, Hss, Gss     cold pack for 10 minutes to to decrease circulation, pain, and swelling.    Home Exercises Provided and Patient Education Provided     Education provided:   Posture awareness    Written Home Exercises Provided: yes.  Exercises were reviewed and Flo was able to demonstrate them prior to the end of the session.  Flo " demonstrated good  understanding of the education provided.       Assessment   Pt tolerating tx well. L knee PROM ext 0' after tx.  VC/TC for correcting form/technique with therex. Pt will begin aquatic therapy x 2 weeks for lower extremity exercise and transition loads from crutches to no AD.   Flo is progressing well towards his goals.   Pt prognosis is Good.     Pt will continue to benefit from skilled outpatient physical therapy to address the deficits listed in the problem list box on initial evaluation, provide pt/family education and to maximize pt's level of independence in the home and community environment.     Pt's spiritual, cultural and educational needs considered and pt agreeable to plan of care and goals.    Anticipated barriers to physical therapy: none     Goals: Short Term GOALS:  In 4-8 weeks, pt. will:  1. Pt will increase ROM to the left knee to 120 degrees of flexion and 0 degrees in extension,  in order to perform ADLs and IADLs more effectively (not met, progressing)  2. Decrease overall pain to the left knee to 2-3/10,  on average with daily activities  (not met, progressing)  3. Increase strength to the left knee to 4-/5 grossly throughout,  in order to perform ADLs and IADLs more effectively  (not met, progressing)  4. Improve FOTO intake score to 65 to demonstrate improvement with functional mobility and use (not met, progressing)  5. Independent with HEP (not met, progressing)  Long Term GOALS:  In 8-12 weeks, pt. will:  1. Pt will increase ROM to the left knee to WNL,  in order to perform ADLs and IADLs more effectively  (not met, progressing)  2. Decrease overall pain to the left knee to 0-2/10,  on average with daily activities  (not met, progressing)  3. Increase strength to the left knee to 5/5 grossly throughout,  in order to perform ADLs and IADLs more effectively  (not met, progressing)  4. Improve FOTO intake score to 75 to demonstrate improvement with functional mobility and  use (not met, progressing)  5. Independent with HEP (not met, progressing)  6. Return to full personalized workouts unrestricted  (not met, progressing)         Plan     Cont with POC per Protocol and progress exercises as planned.     Ranjit Hurley, PT, STS

## 2019-02-22 ENCOUNTER — PATIENT MESSAGE (OUTPATIENT)
Dept: SPORTS MEDICINE | Facility: CLINIC | Age: 24
End: 2019-02-22

## 2019-02-26 ENCOUNTER — CLINICAL SUPPORT (OUTPATIENT)
Dept: REHABILITATION | Facility: HOSPITAL | Age: 24
End: 2019-02-26
Payer: COMMERCIAL

## 2019-02-26 DIAGNOSIS — R26.9 ABNORMALITY OF GAIT: ICD-10-CM

## 2019-02-26 DIAGNOSIS — M25.60 RANGE OF MOTION DEFICIT: ICD-10-CM

## 2019-02-26 DIAGNOSIS — R53.1 WEAKNESS: Primary | ICD-10-CM

## 2019-02-26 PROCEDURE — 97110 THERAPEUTIC EXERCISES: CPT

## 2019-02-26 NOTE — PROGRESS NOTES
Physical Therapy Daily Treatment Note     Name: Flo Mendez Jr.  Clinic Number: [id number]    Therapy Diagnosis:   Encounter Diagnoses   Name Primary?    Weakness Yes    Abnormality of gait     Range of motion deficit      Physician: Adrian Tovar III, *    Visit Date: 2/26/2019  Physician Orders: PT Eval and Treat    Medical Diagnosis:    S83.512A (ICD-10-CM) - Rupture of anterior cruciate ligament of left knee, initial encounter   S83.242A (ICD-10-CM) - Acute medial meniscus tear of left knee, initial encounter   S83.282A (ICD-10-CM) - Acute lateral meniscus tear of left knee, initial encounter         Date of Surgery: 12/18/18  Evaluation Date: 12/19/2018  Authorization Period Expiration: 12/31/18  Plan of Care Certification Period: 4/10/19  Visit # / Visits authorized: 13/25  Aquatic total visits: 1  Total visits:13  Time In: 1455  Time Out: 1555  Total Billable Time: 60 minutes    Precautions: Standard and Fall; no running in pool as of 2/8/2019    Subjective     Pt reports: L knee increased pain since out of brace 2/5/2019  He was compliant with home exercise program.  Response to previous treatment: 1st pool treatment  Functional change: out of brace since 2/5/2019    Pain: 2/10  Location: left knee      Objective     Flo received aquatic therapeutic exercises to develop strength, ROM and flexibility for 45 minutes including:  Bike 10 min bike   Bridge DL 30x 20x SL   SLR 3lbs 30x   TRX squat 45x   SL balance 3x30 sec   Heel raise standing 30x DL   Lateral mini band walking 40'x4                     COOL DOWN x 1 lap each  Walk fwd/back/side    AROM L knee flexion prior to getting in pool 70 degrees  AROM L  Knee after getting out of pool: 75 degrees  Home Exercises Provided and Patient Education Provided     Education provided:   Role of aquatic therapy  Ice L knee after therapy    Written Home Exercises Provided: Patient instructed to cont prior HEP.  Exercises were reviewed and Flo was able  to demonstrate them prior to the end of the session.  Flo demonstrated good  understanding of the education provided.     See EMR under Media for exercises provided prior visit.    Assessment   Flo did well with therapy.  Pt has missed quite a bit of therapy, however he was sick and unable to come to therapy.  Residual soreness and weakness remains in the left knee.  ROM is 0-0-125 in the left knee.   Flo is progressing well towards his goals.benefitting from aquatic therapy as evidenced by 5 degrees increase in Left knee flexion   Pt prognosis is Good.     Pt will continue to benefit from skilled aquatic outpatient physical therapy to address the deficits listed in the problem list box on initial evaluation, provide pt/family education and to maximize pt's level of independence in the home and community environment.     Pt's spiritual, cultural and educational needs considered and pt agreeable to plan of care and goals.    Anticipated barriers to physical therapy: none    Goals:  In 4-8 weeks, pt. will:  1. Pt will increase ROM to the left knee to 120 degrees of flexion and 0 degrees in extension,  in order to perform ADLs and IADLs more effectively   2. Decrease overall pain to the left knee to 2-3/10,  on average with daily activities   3. Increase strength to the left knee to 4-/5 grossly throughout,  in order to perform ADLs and IADLs more effectively   4. Improve FOTO intake score to 65 to demonstrate improvement with functional mobility and use  5. Independent with HEP  Long Term GOALS:  In 8-12 weeks, pt. will:  1. Pt will increase ROM to the left knee to WNL,  in order to perform ADLs and IADLs more effectively   2. Decrease overall pain to the left knee to 0-2/10,  on average with daily activities   3. Increase strength to the left knee to 5/5 grossly throughout,  in order to perform ADLs and IADLs more effectively   4. Improve FOTO intake score to 75 to demonstrate improvement with functional mobility  and use  5. Independent with HEP  6. Return to full personalized workouts unrestricted       Plan     Outpatient Physical Therapy 3 times weekly for 10 -16 weeks to include the following interventions: Aquatic Therapy, Gait Training, Manual Therapy, Moist Heat/ Ice, Neuromuscular Re-ed, Patient Education, Self Care, Therapeutic Activites and Therapeutic Exercise.      Patient may be seen by PTA as part of rehabilitation team      Ranjit Hurley, PT

## 2019-02-28 ENCOUNTER — CLINICAL SUPPORT (OUTPATIENT)
Dept: REHABILITATION | Facility: HOSPITAL | Age: 24
End: 2019-02-28
Payer: COMMERCIAL

## 2019-02-28 PROCEDURE — 97110 THERAPEUTIC EXERCISES: CPT

## 2019-02-28 NOTE — PROGRESS NOTES
Physical Therapy Daily Treatment Note     Name: Flo Mendez Jr.  Clinic Number: [id number]    Therapy Diagnosis:   No diagnosis found.  Physician: Adrian Tovar III, *    Visit Date: 2/28/2019  Physician Orders: PT Eval and Treat    Medical Diagnosis:    S83.512A (ICD-10-CM) - Rupture of anterior cruciate ligament of left knee, initial encounter   S83.242A (ICD-10-CM) - Acute medial meniscus tear of left knee, initial encounter   S83.282A (ICD-10-CM) - Acute lateral meniscus tear of left knee, initial encounter         Date of Surgery: 12/18/18  Evaluation Date: 12/19/2018  Authorization Period Expiration: 12/31/18  Plan of Care Certification Period: 4/10/19  Visit # / Visits authorized: 14/25  Aquatic total visits: 1  Total visits:13  Time In: 1330  Time Out: 1430  Total Billable Time: 50 minutes    Precautions: Standard and Fall; no running in pool as of 2/8/2019    Subjective     Pt reports: minimal pain in L knee when arrived for tx.   He was compliant with home exercise program.  Response to previous treatment: tolerated tx well  Functional change: improved gait - w/o brace    Pain: 1/10  Location: left knee      Objective     Flo received aquatic therapeutic exercises to develop strength, ROM and flexibility for 50 minutes including:    Elliptical 10 min bike for circulation, ROM, and endurance -no resistance  Bridge DL 30x   SL Bridges 30x    SLR 3lbs 30x   SL hip abd 3# 30x   TRX squat 45x   SLS on foam re bounder 2x30   Heel raise standing 30x DL np  Lateral mini band walking 40'x4     RICE for L knee 10' for decreased circulation, edema, and pain    Home Exercises Provided and Patient Education Provided     Education provided:   Posture awareness     Written Home Exercises Provided: Patient instructed to cont prior HEP.  Exercises were reviewed and Flo was able to demonstrate them prior to the end of the session.  Flo demonstrated good  understanding of the education provided.       Assessment      Fol is progressing well.  Continue with strengthening and stability L LE.   Pt prognosis is Good.     Pt will continue to benefit from skilled aquatic outpatient physical therapy to address the deficits listed in the problem list box on initial evaluation, provide pt/family education and to maximize pt's level of independence in the home and community environment.     Pt's spiritual, cultural and educational needs considered and pt agreeable to plan of care and goals.    Anticipated barriers to physical therapy: none    Goals:  In 4-8 weeks, pt. will:  1. Pt will increase ROM to the left knee to 120 degrees of flexion and 0 degrees in extension,  in order to perform ADLs and IADLs more effectively (not met, progressing)  2. Decrease overall pain to the left knee to 2-3/10,  on average with daily activities (not met, progressing)  3. Increase strength to the left knee to 4-/5 grossly throughout,  in order to perform ADLs and IADLs more effectively (not met, progressing)  4. Improve FOTO intake score to 65 to demonstrate improvement with functional mobility and us(not met, progressing)  5. Independent with HEP(not met, progressing)  Long Term GOALS:  In 8-12 weeks, pt. will:  1. Pt will increase ROM to the left knee to WNL,  in order to perform ADLs and IADLs more effectively(not met, progressing)   2. Decrease overall pain to the left knee to 0-2/10,  on average with daily activities (not met, progressing)  3. Increase strength to the left knee to 5/5 grossly throughout,  in order to perform ADLs and IADLs more effectively (not met, progressing)  4. Improve FOTO intake score to 75 to demonstrate improvement with functional mobility and use(not met, progressing)  5. Independent with HEP(not met, progressing)  6. Return to full personalized workouts unrestricted (not met, progressing)      Plan     Outpatient Physical Therapy 3 times weekly for 10 -16 weeks to include the following interventions: Aquatic Therapy,  Gait Training, Manual Therapy, Moist Heat/ Ice, Neuromuscular Re-ed, Patient Education, Self Care, Therapeutic Activites and Therapeutic Exercise.        Rico Fragoso, PTA, STS

## 2019-03-08 ENCOUNTER — CLINICAL SUPPORT (OUTPATIENT)
Dept: REHABILITATION | Facility: HOSPITAL | Age: 24
End: 2019-03-08
Payer: COMMERCIAL

## 2019-03-08 PROCEDURE — 97110 THERAPEUTIC EXERCISES: CPT

## 2019-03-08 NOTE — PROGRESS NOTES
Physical Therapy Daily Treatment Note     Name: Flo Mendez Jr.  Clinic Number: [id number]    Therapy Diagnosis:   No diagnosis found.  Physician: Adrian Tovar III, *    Visit Date: 3/8/2019  Physician Orders: PT Eval and Treat    Medical Diagnosis:    S83.512A (ICD-10-CM) - Rupture of anterior cruciate ligament of left knee, initial encounter   S83.242A (ICD-10-CM) - Acute medial meniscus tear of left knee, initial encounter   S83.282A (ICD-10-CM) - Acute lateral meniscus tear of left knee, initial encounter         Date of Surgery: 12/18/18  Evaluation Date: 12/19/2018  Authorization Period Expiration: 12/31/18  Plan of Care Certification Period: 4/10/19  Visit # / Visits authorized: 15/25  Aquatic total visits: 1  Total visits:14  Time In: 1525  Time Out: 1625  Total Billable Time: 50 minutes    Precautions: Standard and Fall; no running in pool as of 2/8/2019    Subjective     Pt reports: no  pain in L knee when arrived for tx.   He was compliant with home exercise program.  Response to previous treatment: tolerated tx well  Functional change: improved gait     Pain: 0/10  Location: left knee      Objective     Flo received aquatic therapeutic exercises to develop strength, ROM and flexibility for 50 minutes including:    Bike 10 min  for circulation, ROM, and endurance  LV 4, 80 RPM  Bridge DL 30x   SL Bridges 30x    B leg press 120# 3x10  SL Leg press 60# 3x10  TRX squat 45x   Bosu marching 5'   SLS foam re bounder 2x30  GTB lateral walking 4 laps    SLR 3lbs 30x np  SL hip abd 3# 30x np  Heel raise standing 30x DL np      RICE for L knee 10' for decreased circulation, edema, and pain    Home Exercises Provided and Patient Education Provided     Education provided:   Posture awareness     Written Home Exercises Provided: Patient instructed to cont prior HEP.  Exercises were reviewed and Flo was able to demonstrate them prior to the end of the session.  Flo demonstrated good  understanding of the  education provided.       Assessment   Pt tolerating tx well. VC/TC for quad activation and contraction with therex. No pain during tx but min mm fatigue. Continue to progress as tolerated.   Pt prognosis is Good.     Pt will continue to benefit from skilled aquatic outpatient physical therapy to address the deficits listed in the problem list box on initial evaluation, provide pt/family education and to maximize pt's level of independence in the home and community environment.     Pt's spiritual, cultural and educational needs considered and pt agreeable to plan of care and goals.    Anticipated barriers to physical therapy: none    Goals:  In 4-8 weeks, pt. will:  1. Pt will increase ROM to the left knee to 120 degrees of flexion and 0 degrees in extension,  in order to perform ADLs and IADLs more effectively (not met, progressing)  2. Decrease overall pain to the left knee to 2-3/10,  on average with daily activities (not met, progressing)  3. Increase strength to the left knee to 4-/5 grossly throughout,  in order to perform ADLs and IADLs more effectively (not met, progressing)  4. Improve FOTO intake score to 65 to demonstrate improvement with functional mobility and us(not met, progressing)  5. Independent with HEP(not met, progressing)  Long Term GOALS:  In 8-12 weeks, pt. will:  1. Pt will increase ROM to the left knee to WNL,  in order to perform ADLs and IADLs more effectively(not met, progressing)   2. Decrease overall pain to the left knee to 0-2/10,  on average with daily activities (not met, progressing)  3. Increase strength to the left knee to 5/5 grossly throughout,  in order to perform ADLs and IADLs more effectively (not met, progressing)  4. Improve FOTO intake score to 75 to demonstrate improvement with functional mobility and use(not met, progressing)  5. Independent with HEP(not met, progressing)  6. Return to full personalized workouts unrestricted (not met, progressing)      Plan      Outpatient Physical Therapy 3 times weekly for 10 -16 weeks to include the following interventions: Aquatic Therapy, Gait Training, Manual Therapy, Moist Heat/ Ice, Neuromuscular Re-ed, Patient Education, Self Care, Therapeutic Activites and Therapeutic Exercise.        Rico Fragoso, PTA, STS

## 2019-03-11 ENCOUNTER — CLINICAL SUPPORT (OUTPATIENT)
Dept: REHABILITATION | Facility: HOSPITAL | Age: 24
End: 2019-03-11
Payer: COMMERCIAL

## 2019-03-11 DIAGNOSIS — R53.1 WEAKNESS: Primary | ICD-10-CM

## 2019-03-11 DIAGNOSIS — S83.512A RUPTURE OF ANTERIOR CRUCIATE LIGAMENT OF LEFT KNEE, INITIAL ENCOUNTER: ICD-10-CM

## 2019-03-11 PROCEDURE — 97110 THERAPEUTIC EXERCISES: CPT

## 2019-03-14 ENCOUNTER — CLINICAL SUPPORT (OUTPATIENT)
Dept: REHABILITATION | Facility: HOSPITAL | Age: 24
End: 2019-03-14
Payer: COMMERCIAL

## 2019-03-14 PROCEDURE — 97110 THERAPEUTIC EXERCISES: CPT

## 2019-03-14 NOTE — PROGRESS NOTES
Physical Therapy Daily Treatment Note     Name: Flo Mendez Jr.  Clinic Number: [id number]    Therapy Diagnosis:   No diagnosis found.  Physician: Adrian Tovar III, *    Visit Date: 3/14/2019  Physician Orders: PT Eval and Treat    Medical Diagnosis:    S83.512A (ICD-10-CM) - Rupture of anterior cruciate ligament of left knee, initial encounter   S83.242A (ICD-10-CM) - Acute medial meniscus tear of left knee, initial encounter   S83.282A (ICD-10-CM) - Acute lateral meniscus tear of left knee, initial encounter         Date of Surgery: 12/18/18  Evaluation Date: 12/19/2018  Authorization Period Expiration: 12/31/18  Plan of Care Certification Period: 4/10/19  Visit # / Visits authorized: 16/25  Aquatic total visits: 1  Total visits:14  Time In: 0930  Time Out: 1030  Total Billable Time: 50 minutes    Precautions: Standard and Fall; no running in pool as of 2/8/2019    Subjective     Pt reports: no pain in L knee when arrived for tx.   He was compliant with home exercise program.  Response to previous treatment: tolerated tx well  Functional change: improved gait     Pain: 0/10  Location: left knee      Objective     ROM: 3/11/19  Left knee flexion 130   Left knee extension 0     Flo received aquatic therapeutic exercises to develop strength, ROM and flexibility for 50 minutes including:    Elliptical random lv 1 10'  Bridge DL on box w/ HS bias  30x   SL Bridges on green box 30x    B leg press 120# 3x10  SL Leg press 60# 3x10  Squat 45x 25lb  SLS foam re bounder 2x30  GTB lateral walking 4 laps  Knee ext 5lbs 90-60 degree   Standing calf raise 45x     Lower body stretch 5 min       RICE for L knee 10' for decreased circulation, edema, and pain    Home Exercises Provided and Patient Education Provided     Education provided:   Posture awareness     Written Home Exercises Provided: Patient instructed to cont prior HEP.  Exercises were reviewed and Flo was able to demonstrate them prior to the end of the  session.  Flo demonstrated good  understanding of the education provided.       Assessment   Pt tolerating tx well. VC/TC for quad activation and contraction with therex.  Continues with  positive gains with rehab and will continue to benefit from skilled PT.  We did progress exercises appropriately.  I did speak with Rico Lynch PTA regarding POC.    Pt prognosis is Good.     Pt will continue to benefit from skilled aquatic outpatient physical therapy to address the deficits listed in the problem list box on initial evaluation, provide pt/family education and to maximize pt's level of independence in the home and community environment.     Pt's spiritual, cultural and educational needs considered and pt agreeable to plan of care and goals.    Anticipated barriers to physical therapy: none    Goals:  In 4-8 weeks, pt. will:  1. Pt will increase ROM to the left knee to 120 degrees of flexion and 0 degrees in extension,  in order to perform ADLs and IADLs more effectively (not met, progressing)  2. Decrease overall pain to the left knee to 2-3/10,  on average with daily activities (not met, progressing)  3. Increase strength to the left knee to 4-/5 grossly throughout,  in order to perform ADLs and IADLs more effectively (not met, progressing)  4. Improve FOTO intake score to 65 to demonstrate improvement with functional mobility and us(not met, progressing)  5. Independent with HEP(not met, progressing)  Long Term GOALS:  In 8-12 weeks, pt. will:  1. Pt will increase ROM to the left knee to WNL,  in order to perform ADLs and IADLs more effectively(not met, progressing)   2. Decrease overall pain to the left knee to 0-2/10,  on average with daily activities (not met, progressing)  3. Increase strength to the left knee to 5/5 grossly throughout,  in order to perform ADLs and IADLs more effectively (not met, progressing)  4. Improve FOTO intake score to 75 to demonstrate improvement with functional mobility and use(not  met, progressing)  5. Independent with HEP(not met, progressing)  6. Return to full personalized workouts unrestricted (not met, progressing)      Plan     Outpatient Physical Therapy 3 times weekly for 10 -16 weeks to include the following interventions: Aquatic Therapy, Gait Training, Manual Therapy, Moist Heat/ Ice, Neuromuscular Re-ed, Patient Education, Self Care, Therapeutic Activites and Therapeutic Exercise.        Rico Fragoso, PTA, STS

## 2019-03-19 ENCOUNTER — CLINICAL SUPPORT (OUTPATIENT)
Dept: REHABILITATION | Facility: HOSPITAL | Age: 24
End: 2019-03-19
Payer: COMMERCIAL

## 2019-03-19 DIAGNOSIS — R53.1 WEAKNESS: Primary | ICD-10-CM

## 2019-03-19 DIAGNOSIS — S83.512A RUPTURE OF ANTERIOR CRUCIATE LIGAMENT OF LEFT KNEE, INITIAL ENCOUNTER: ICD-10-CM

## 2019-03-19 PROCEDURE — 97110 THERAPEUTIC EXERCISES: CPT

## 2019-03-19 NOTE — PROGRESS NOTES
Physical Therapy Daily Treatment Note     Name: Flo Mendez .  Clinic Number: [id number]    Therapy Diagnosis:   Encounter Diagnoses   Name Primary?    Weakness Yes    Rupture of anterior cruciate ligament of left knee, initial encounter      Physician: Adrian Tovar III, *    Visit Date: 3/19/2019  Physician Orders: PT Eval and Treat    Medical Diagnosis:    S83.512A (ICD-10-CM) - Rupture of anterior cruciate ligament of left knee, initial encounter   S83.242A (ICD-10-CM) - Acute medial meniscus tear of left knee, initial encounter   S83.282A (ICD-10-CM) - Acute lateral meniscus tear of left knee, initial encounter         Date of Surgery: 12/18/18  Evaluation Date: 12/19/2018  Authorization Period Expiration: 12/31/18  Plan of Care Certification Period: 4/10/19  Visit # / Visits authorized: 16/25  Aquatic total visits: 1  Total visits:14  Time In: 0930  Time Out: 1030  Total Billable Time: 50 minutes    Precautions: Standard and Fall; no running in pool as of 2/8/2019    Subjective     Pt reports: no pain in L knee when arrived for tx.   He was compliant with home exercise program.  Response to previous treatment: tolerated tx well  Functional change: improved gait     Pain: 0/10  Location: left knee      Objective     ROM: 3/11/19  Left knee flexion 130   Left knee extension 0     Flo received aquatic therapeutic exercises to develop strength, ROM and flexibility for 50 minutes including:    Elliptical random lv 1 10'  Bridge DL on box w/ HS bias  30x   SL Bridges on green box 30x    B leg press 120# 3x10  SL Leg press 60# 3x10  Squat 45x 25lb  SLS foam re bounder 2x30  GTB lateral walking 4 laps  Squats 0-60 25lbs on biodex for appropriate wt distribution   Standing calf raise 45x   HS curls, manaul resist 0-90 both con and eccentric bias 30x     Lower body stretch 5 min       RICE for L knee 10' for decreased circulation, edema, and pain    Home Exercises Provided and Patient Education Provided      Education provided:   Posture awareness     Written Home Exercises Provided: Patient instructed to cont prior HEP.  Exercises were reviewed and Flo was able to demonstrate them prior to the end of the session.  Flo demonstrated good  understanding of the education provided.       Assessment   Pt tolerating tx well. We will integrate change of direction with ambulation and more lateral and transverse plane exercises.  Overall doing well.   Pt prognosis is Good.     Pt will continue to benefit from skilled aquatic outpatient physical therapy to address the deficits listed in the problem list box on initial evaluation, provide pt/family education and to maximize pt's level of independence in the home and community environment.     Pt's spiritual, cultural and educational needs considered and pt agreeable to plan of care and goals.    Anticipated barriers to physical therapy: none    Goals:  In 4-8 weeks, pt. will:  1. Pt will increase ROM to the left knee to 120 degrees of flexion and 0 degrees in extension,  in order to perform ADLs and IADLs more effectively (not met, progressing)  2. Decrease overall pain to the left knee to 2-3/10,  on average with daily activities (not met, progressing)  3. Increase strength to the left knee to 4-/5 grossly throughout,  in order to perform ADLs and IADLs more effectively (not met, progressing)  4. Improve FOTO intake score to 65 to demonstrate improvement with functional mobility and us(not met, progressing)  5. Independent with HEP(not met, progressing)  Long Term GOALS:  In 8-12 weeks, pt. will:  1. Pt will increase ROM to the left knee to WNL,  in order to perform ADLs and IADLs more effectively(not met, progressing)   2. Decrease overall pain to the left knee to 0-2/10,  on average with daily activities (not met, progressing)  3. Increase strength to the left knee to 5/5 grossly throughout,  in order to perform ADLs and IADLs more effectively (not met, progressing)  4.  Improve FOTO intake score to 75 to demonstrate improvement with functional mobility and use(not met, progressing)  5. Independent with HEP(not met, progressing)  6. Return to full personalized workouts unrestricted (not met, progressing)      Plan     Outpatient Physical Therapy 3 times weekly for 10 -16 weeks to include the following interventions: Aquatic Therapy, Gait Training, Manual Therapy, Moist Heat/ Ice, Neuromuscular Re-ed, Patient Education, Self Care, Therapeutic Activites and Therapeutic Exercise.        Ranjit Hurley, PT, STS

## 2019-03-21 ENCOUNTER — CLINICAL SUPPORT (OUTPATIENT)
Dept: REHABILITATION | Facility: HOSPITAL | Age: 24
End: 2019-03-21
Payer: COMMERCIAL

## 2019-03-21 PROCEDURE — 97110 THERAPEUTIC EXERCISES: CPT

## 2019-03-21 NOTE — PROGRESS NOTES
"Physical Therapy Daily Treatment Note     Name: Flo Mendez Jr.  Clinic Number: [id number]    Therapy Diagnosis:   No diagnosis found.  Physician: Adrian Tovar III, *    Visit Date: 3/21/2019  Physician Orders: PT Eval and Treat    Medical Diagnosis:    S83.512A (ICD-10-CM) - Rupture of anterior cruciate ligament of left knee, initial encounter   S83.242A (ICD-10-CM) - Acute medial meniscus tear of left knee, initial encounter   S83.282A (ICD-10-CM) - Acute lateral meniscus tear of left knee, initial encounter         Date of Surgery: 12/18/18  Evaluation Date: 12/19/2018  Authorization Period Expiration: 12/31/18  Plan of Care Certification Period: 4/10/19  Visit # / Visits authorized: 16/25  Aquatic total visits: 1  Total visits:14  Time In:1530  Time Out: 1630  Total Billable Time: 50 minutes    Precautions: Standard and Fall; no running in pool as of 2/8/2019    Subjective     Pt reports: min pain in L knee when arrived for tx.   He was compliant with home exercise program.  Response to previous treatment: min soreness in medial knee   Functional change: improved gait     Pain: 3/10  Location: left knee      Objective       Flo received aquatic therapeutic exercises to develop strength, ROM and flexibility for 50 minutes including:    Elliptical random lv 1 10'  B leg press 120# 3x10  SL Leg press 60# 3x10  12" L LE box step up with R knee flexion 3x10  SL Bridges on green box 30x   TRX squats 3x10   SLS foam re bounder 2x30  GTB lateral walking 4 laps  Squats 0-60 25lbs on biodex for appropriate wt distribution   Standing calf raise on stairs  45x       RICE for L knee 10' for decreased circulation, edema, and pain    Home Exercises Provided and Patient Education Provided     Education provided:   Posture awareness     Written Home Exercises Provided: Patient instructed to cont prior HEP.  Exercises were reviewed and Flo was able to demonstrate them prior to the end of the session.  Flo " demonstrated good  understanding of the education provided.       Assessment   Pt tolerating tx well. VC/TC for correcting form/technique.  Overall doing well.   Pt prognosis is Good.     Pt will continue to benefit from skilled aquatic outpatient physical therapy to address the deficits listed in the problem list box on initial evaluation, provide pt/family education and to maximize pt's level of independence in the home and community environment.     Pt's spiritual, cultural and educational needs considered and pt agreeable to plan of care and goals.    Anticipated barriers to physical therapy: none    Goals:  In 4-8 weeks, pt. will:  1. Pt will increase ROM to the left knee to 120 degrees of flexion and 0 degrees in extension,  in order to perform ADLs and IADLs more effectively (not met, progressing)  2. Decrease overall pain to the left knee to 2-3/10,  on average with daily activities (not met, progressing)  3. Increase strength to the left knee to 4-/5 grossly throughout,  in order to perform ADLs and IADLs more effectively (not met, progressing)  4. Improve FOTO intake score to 65 to demonstrate improvement with functional mobility and us(not met, progressing)  5. Independent with HEP(not met, progressing)  Long Term GOALS:  In 8-12 weeks, pt. will:  1. Pt will increase ROM to the left knee to WNL,  in order to perform ADLs and IADLs more effectively(not met, progressing)   2. Decrease overall pain to the left knee to 0-2/10,  on average with daily activities (not met, progressing)  3. Increase strength to the left knee to 5/5 grossly throughout,  in order to perform ADLs and IADLs more effectively (not met, progressing)  4. Improve FOTO intake score to 75 to demonstrate improvement with functional mobility and use(not met, progressing)  5. Independent with HEP(not met, progressing)  6. Return to full personalized workouts unrestricted (not met, progressing)      Plan     Outpatient Physical Therapy 3 times  weekly for 10 -16 weeks to include the following interventions: Aquatic Therapy, Gait Training, Manual Therapy, Moist Heat/ Ice, Neuromuscular Re-ed, Patient Education, Self Care, Therapeutic Activites and Therapeutic Exercise.        Rico Fragoso, PTA, STS

## 2019-03-29 ENCOUNTER — CLINICAL SUPPORT (OUTPATIENT)
Dept: REHABILITATION | Facility: HOSPITAL | Age: 24
End: 2019-03-29
Payer: COMMERCIAL

## 2019-03-29 PROCEDURE — 97110 THERAPEUTIC EXERCISES: CPT

## 2019-03-29 NOTE — PROGRESS NOTES
"Physical Therapy Daily Treatment Note     Name: Flo Mendez Jr.  Clinic Number: [id number]    Therapy Diagnosis:   No diagnosis found.  Physician: Adrian Tovar III, *    Visit Date: 3/29/2019  Physician Orders: PT Eval and Treat    Medical Diagnosis:    S83.512A (ICD-10-CM) - Rupture of anterior cruciate ligament of left knee, initial encounter   S83.242A (ICD-10-CM) - Acute medial meniscus tear of left knee, initial encounter   S83.282A (ICD-10-CM) - Acute lateral meniscus tear of left knee, initial encounter         Date of Surgery: 12/18/18  Evaluation Date: 12/19/2018  Authorization Period Expiration: 12/31/18  Plan of Care Certification Period: 4/10/19  Visit # / Visits authorized: 17/25  Aquatic total visits: 1  Total visits:14  Time In:1025  Time Out: 1125  Total Billable Time: 50 minutes    Precautions: Standard and Fall; no running in pool as of 2/8/2019    Subjective     Pt reports: no pain in L knee when arrived for tx.   He was compliant with home exercise program.  Response to previous treatment: min soreness in medial knee   Functional change: improved gait     Pain: 0/10  Location: left knee      Objective       Flo received aquatic therapeutic exercises to develop strength, ROM and flexibility for 50 minutes including:    Elliptical random lv 1 10'  Wall ball on foam 3x10  Blue box 24" L SL squat 10x   Dynamic stretching - toy soldiers, high knees, butt kicks, side shuffle, heel/toe walk 2 laps  B leg press 140# 3x10  SL Leg press 80# 3x10  TRX squats 3x10   SLS foam re bounder 2x30  GTB lateral walking 4 laps    12" L LE box step up with R knee flexion 3x10 np  SL Bridges on green box 30x np  Squats 0-60 25lbs on biodex for appropriate wt distribution np  Standing calf raise on stairs  45x np      RICE for L knee 10' for decreased circulation, edema, and pain    Home Exercises Provided and Patient Education Provided     Education provided:   Posture awareness     Written Home Exercises " Provided: Patient instructed to cont prior HEP.  Exercises were reviewed and Flo was able to demonstrate them prior to the end of the session.  Flo demonstrated good  understanding of the education provided.       Assessment   Pt tolerating tx well. Demonstrating improved strength and stability L knee but min instability with mm fatigue. Progressing well.  VC/TC for correcting form/technique. Continue to advance as appropriate.   Pt prognosis is Good.     Pt will continue to benefit from skilled aquatic outpatient physical therapy to address the deficits listed in the problem list box on initial evaluation, provide pt/family education and to maximize pt's level of independence in the home and community environment.     Pt's spiritual, cultural and educational needs considered and pt agreeable to plan of care and goals.    Anticipated barriers to physical therapy: none    Goals:  In 4-8 weeks, pt. will:  1. Pt will increase ROM to the left knee to 120 degrees of flexion and 0 degrees in extension,  in order to perform ADLs and IADLs more effectively (not met, progressing)  2. Decrease overall pain to the left knee to 2-3/10,  on average with daily activities (not met, progressing)  3. Increase strength to the left knee to 4-/5 grossly throughout,  in order to perform ADLs and IADLs more effectively (not met, progressing)  4. Improve FOTO intake score to 65 to demonstrate improvement with functional mobility and us(not met, progressing)  5. Independent with HEP(not met, progressing)  Long Term GOALS:  In 8-12 weeks, pt. will:  1. Pt will increase ROM to the left knee to WNL,  in order to perform ADLs and IADLs more effectively(not met, progressing)   2. Decrease overall pain to the left knee to 0-2/10,  on average with daily activities (not met, progressing)  3. Increase strength to the left knee to 5/5 grossly throughout,  in order to perform ADLs and IADLs more effectively (not met, progressing)  4. Improve FOTO  intake score to 75 to demonstrate improvement with functional mobility and use(not met, progressing)  5. Independent with HEP(not met, progressing)  6. Return to full personalized workouts unrestricted (not met, progressing)      Plan     Outpatient Physical Therapy 3 times weekly for 10 -16 weeks to include the following interventions: Aquatic Therapy, Gait Training, Manual Therapy, Moist Heat/ Ice, Neuromuscular Re-ed, Patient Education, Self Care, Therapeutic Activites and Therapeutic Exercise.        Rico Fragoso, PTA, STS

## 2019-04-02 ENCOUNTER — CLINICAL SUPPORT (OUTPATIENT)
Dept: REHABILITATION | Facility: HOSPITAL | Age: 24
End: 2019-04-02
Payer: COMMERCIAL

## 2019-04-02 DIAGNOSIS — R53.1 WEAKNESS: Primary | ICD-10-CM

## 2019-04-02 DIAGNOSIS — R26.9 ABNORMALITY OF GAIT: ICD-10-CM

## 2019-04-02 DIAGNOSIS — M25.60 RANGE OF MOTION DEFICIT: ICD-10-CM

## 2019-04-02 PROCEDURE — 97110 THERAPEUTIC EXERCISES: CPT

## 2019-04-02 NOTE — PROGRESS NOTES
"Physical Therapy Daily Treatment Note     Name: Flo Mendez .  Clinic Number: [id number]    Therapy Diagnosis:   Encounter Diagnoses   Name Primary?    Weakness Yes    Abnormality of gait     Range of motion deficit      Physician: Adrian Tovar III, *    Visit Date: 4/2/2019  Physician Orders: PT Eval and Treat    Medical Diagnosis:    S83.512A (ICD-10-CM) - Rupture of anterior cruciate ligament of left knee, initial encounter   S83.242A (ICD-10-CM) - Acute medial meniscus tear of left knee, initial encounter   S83.282A (ICD-10-CM) - Acute lateral meniscus tear of left knee, initial encounter         Date of Surgery: 12/18/18  Evaluation Date: 12/19/2018  Authorization Period Expiration: 12/31/18  Plan of Care Certification Period: 4/10/19  Visit # / Visits authorized: 17/25  Aquatic total visits: 1  Total visits:14  Time In:905am  Time Out: 1000  Total Billable Time: 50 minutes    Precautions: Standard and Fall; no running in pool as of 2/8/2019    Subjective     Pt reports: no pain in L knee when arrived for tx. Feeling "tired".   He was compliant with home exercise program.  Response to previous treatment: min soreness in medial knee   Functional change: improved gait     Pain: 0/10  Location: left knee      Objective       Flo received aquatic therapeutic exercises to develop strength, ROM and flexibility for 50 minutes including:    Elliptical random lv 1 10'  Band walks 3 laps GTB  TRX Squats 30x   Goblet squats 30x #15lbs  Blue box 24" L SL squat 20x  Shuttle press 3bands 30x  SL shuttlw press 2 bands 30x  6 in box bridges 30x  SL 6 in box bridges 30x  SLS foam rebounder 3x30x  RDL w/ 6in cone 30x      RICE for L knee 10' for decreased circulation, edema, and pain    Home Exercises Provided and Patient Education Provided     Education provided:   Posture awareness     Written Home Exercises Provided: Patient instructed to cont prior HEP.  Exercises were reviewed and Flo was able to " demonstrate them prior to the end of the session.  Flo demonstrated good  understanding of the education provided.       Assessment   Pt tolerating tx well. Demonstrating improved strength and stability L knee but min instability with mm fatigue. Was tired this AM, however tolerated exercise load well.  Pt will begin light jogging on next visit.    Pt prognosis is Good.     Pt will continue to benefit from skilled aquatic outpatient physical therapy to address the deficits listed in the problem list box on initial evaluation, provide pt/family education and to maximize pt's level of independence in the home and community environment.     Pt's spiritual, cultural and educational needs considered and pt agreeable to plan of care and goals.    Anticipated barriers to physical therapy: none    Goals:  In 4-8 weeks, pt. will:  1. Pt will increase ROM to the left knee to 120 degrees of flexion and 0 degrees in extension,  in order to perform ADLs and IADLs more effectively (not met, progressing)  2. Decrease overall pain to the left knee to 2-3/10,  on average with daily activities (not met, progressing)  3. Increase strength to the left knee to 4-/5 grossly throughout,  in order to perform ADLs and IADLs more effectively (not met, progressing)  4. Improve FOTO intake score to 65 to demonstrate improvement with functional mobility and us(not met, progressing)  5. Independent with HEP(not met, progressing)  Long Term GOALS:  In 8-12 weeks, pt. will:  1. Pt will increase ROM to the left knee to WNL,  in order to perform ADLs and IADLs more effectively(not met, progressing)   2. Decrease overall pain to the left knee to 0-2/10,  on average with daily activities (not met, progressing)  3. Increase strength to the left knee to 5/5 grossly throughout,  in order to perform ADLs and IADLs more effectively (not met, progressing)  4. Improve FOTO intake score to 75 to demonstrate improvement with functional mobility and use(not  met, progressing)  5. Independent with HEP(not met, progressing)  6. Return to full personalized workouts unrestricted (not met, progressing)      Plan   Continue physical therapy as planned and progress exercises as tolerated.        Ranjit Hurley, PT,

## 2019-04-04 ENCOUNTER — CLINICAL SUPPORT (OUTPATIENT)
Dept: REHABILITATION | Facility: HOSPITAL | Age: 24
End: 2019-04-04
Payer: COMMERCIAL

## 2019-04-04 PROCEDURE — 97110 THERAPEUTIC EXERCISES: CPT

## 2019-04-04 NOTE — PROGRESS NOTES
"Physical Therapy Daily Treatment Note     Name: Flo Mendez Jr.  Clinic Number: [id number]    Therapy Diagnosis:   No diagnosis found.  Physician: Adrian Tovar III, *    Visit Date: 4/4/2019  Physician Orders: PT Eval and Treat    Medical Diagnosis:    S83.512A (ICD-10-CM) - Rupture of anterior cruciate ligament of left knee, initial encounter   S83.242A (ICD-10-CM) - Acute medial meniscus tear of left knee, initial encounter   S83.282A (ICD-10-CM) - Acute lateral meniscus tear of left knee, initial encounter         Date of Surgery: 12/18/18  Evaluation Date: 12/19/2018  Authorization Period Expiration: 12/31/18  Plan of Care Certification Period: 4/10/19  Visit # / Visits authorized: 17/25  Aquatic total visits: 1  Total visits:14  Time In: 0935  Time Out: 1030  Total Billable Time: 50 minutes    Precautions: Standard and Fall; no running in pool as of 2/8/2019    Subjective     Pt reports: no pain in L knee when arrived for tx. Feeling "tired".   He was compliant with home exercise program.  Response to previous treatment: min soreness in medial knee   Functional change: improved gait     Pain: 0/10  Location: left knee      Objective       Flo received aquatic therapeutic exercises to develop strength, ROM and flexibility for 50 minutes including:    Elliptical random lv 1 10'  Band walks 3 laps GTB  TRX Squats 30x   Shuttle press 3 bands 30x  SL Shuttle press 2 bands 30xw  TM -jogging 5' with 1' walk w/u and cool down  B GSS on slant board 3x10  SL 6 in box bridges 30x  SLS L LE 24" box 2x10    Not today  Goblet squats 30x #15lbs  6 in box bridges 30x  SL 6 in box bridges 30x  SLS foam rebounder 3x30x  RDL w/ 6in cone 30x      RICE for L knee 10' for decreased circulation, edema, and pain    Home Exercises Provided and Patient Education Provided     Education provided:   Posture awareness     Written Home Exercises Provided: Patient instructed to cont prior HEP.  Exercises were reviewed and Flo was " able to demonstrate them prior to the end of the session.  Flo demonstrated good  understanding of the education provided.       Assessment   Pt tolerating tx well.  No increased knee pain with light jog but B calf cramping. Continue to progress as tolerated.   Pt prognosis is Good.     Pt will continue to benefit from skilled aquatic outpatient physical therapy to address the deficits listed in the problem list box on initial evaluation, provide pt/family education and to maximize pt's level of independence in the home and community environment.     Pt's spiritual, cultural and educational needs considered and pt agreeable to plan of care and goals.    Anticipated barriers to physical therapy: none    Goals:  In 4-8 weeks, pt. will:  1. Pt will increase ROM to the left knee to 120 degrees of flexion and 0 degrees in extension,  in order to perform ADLs and IADLs more effectively (not met, progressing)  2. Decrease overall pain to the left knee to 2-3/10,  on average with daily activities (not met, progressing)  3. Increase strength to the left knee to 4-/5 grossly throughout,  in order to perform ADLs and IADLs more effectively (not met, progressing)  4. Improve FOTO intake score to 65 to demonstrate improvement with functional mobility and us(not met, progressing)  5. Independent with HEP(not met, progressing)  Long Term GOALS:  In 8-12 weeks, pt. will:  1. Pt will increase ROM to the left knee to WNL,  in order to perform ADLs and IADLs more effectively(not met, progressing)   2. Decrease overall pain to the left knee to 0-2/10,  on average with daily activities (not met, progressing)  3. Increase strength to the left knee to 5/5 grossly throughout,  in order to perform ADLs and IADLs more effectively (not met, progressing)  4. Improve FOTO intake score to 75 to demonstrate improvement with functional mobility and use(not met, progressing)  5. Independent with HEP(not met, progressing)  6. Return to full  personalized workouts unrestricted (not met, progressing)      Plan   Continue physical therapy as planned and progress exercises as tolerated.        Rico Fragoso, PTA, STS

## 2019-04-11 ENCOUNTER — CLINICAL SUPPORT (OUTPATIENT)
Dept: REHABILITATION | Facility: HOSPITAL | Age: 24
End: 2019-04-11
Payer: COMMERCIAL

## 2019-04-11 PROCEDURE — 97110 THERAPEUTIC EXERCISES: CPT

## 2019-04-11 NOTE — PROGRESS NOTES
"Physical Therapy Daily Treatment Note     Name: Flo Mendez Jr.  Clinic Number: [id number]    Therapy Diagnosis:   No diagnosis found.  Physician: Adrian Tovar III, *    Visit Date: 4/11/2019  Physician Orders: PT Eval and Treat    Medical Diagnosis:    S83.512A (ICD-10-CM) - Rupture of anterior cruciate ligament of left knee, initial encounter   S83.242A (ICD-10-CM) - Acute medial meniscus tear of left knee, initial encounter   S83.282A (ICD-10-CM) - Acute lateral meniscus tear of left knee, initial encounter         Date of Surgery: 12/18/18  Evaluation Date: 12/19/2018  Authorization Period Expiration: 12/31/18  Plan of Care Certification Period: 4/10/19  Visit # / Visits authorized: 17/25  Aquatic total visits: 1  Total visits:14  Time In: 1425  Time Out: 1525  Total Billable Time: 50 minutes    Precautions: Standard and Fall; no running in pool as of 2/8/2019    Subjective     Pt reports: no pain in L knee when arrived for tx. Feeling "tired".   He was compliant with home exercise program.  Response to previous treatment: min soreness in medial knee   Functional change: improved gait     Pain: 0/10  Location: left knee      Objective       Flo received aquatic therapeutic exercises to develop strength, ROM and flexibility for 50 minutes including:    Elliptical random lv 1 10'  Band walks 3 laps GTB  TRX Squats 30x   B leg press 140# 3x10  SL Leg press 80# 3x10  GSS on slant 2x/1'  R HSS on stairs 2x/1'  TM -jogging ' with 1' walk w/u and cool down  SL 6 in box bridges 30x  SLS L LE 24" box 2x10  Goblet squats 30x #15lbs  SLS foam rebounder 3x30x    Not today  6 in box bridges 30x  SL 6 in box bridges 30x  RDL w/ 6in cone 30x      RICE for L knee 10' for decreased circulation, edema, and pain    Home Exercises Provided and Patient Education Provided     Education provided:   Posture awareness     Written Home Exercises Provided: Patient instructed to cont prior HEP.  Exercises were reviewed and Flo " was able to demonstrate them prior to the end of the session.  Flo demonstrated good  understanding of the education provided.       Assessment   Pt tolerating tx well.  No increased knee pain with light jog but continued with min B calf cramping, decreased with stretching.  Continue to progress as tolerated.   Pt prognosis is Good.     Pt will continue to benefit from skilled aquatic outpatient physical therapy to address the deficits listed in the problem list box on initial evaluation, provide pt/family education and to maximize pt's level of independence in the home and community environment.     Pt's spiritual, cultural and educational needs considered and pt agreeable to plan of care and goals.    Anticipated barriers to physical therapy: none    Goals:  In 4-8 weeks, pt. will:  1. Pt will increase ROM to the left knee to 120 degrees of flexion and 0 degrees in extension,  in order to perform ADLs and IADLs more effectively (not met, progressing)  2. Decrease overall pain to the left knee to 2-3/10,  on average with daily activities (not met, progressing)  3. Increase strength to the left knee to 4-/5 grossly throughout,  in order to perform ADLs and IADLs more effectively (not met, progressing)  4. Improve FOTO intake score to 65 to demonstrate improvement with functional mobility and us(not met, progressing)  5. Independent with HEP(not met, progressing)  Long Term GOALS:  In 8-12 weeks, pt. will:  1. Pt will increase ROM to the left knee to WNL,  in order to perform ADLs and IADLs more effectively(not met, progressing)   2. Decrease overall pain to the left knee to 0-2/10,  on average with daily activities (not met, progressing)  3. Increase strength to the left knee to 5/5 grossly throughout,  in order to perform ADLs and IADLs more effectively (not met, progressing)  4. Improve FOTO intake score to 75 to demonstrate improvement with functional mobility and use(not met, progressing)  5. Independent with  HEP(not met, progressing)  6. Return to full personalized workouts unrestricted (not met, progressing)      Plan   Continue physical therapy as planned and progress exercises as tolerated.        Rico Fragoso, PTA, STS

## 2019-04-15 ENCOUNTER — CLINICAL SUPPORT (OUTPATIENT)
Dept: REHABILITATION | Facility: HOSPITAL | Age: 24
End: 2019-04-15
Payer: COMMERCIAL

## 2019-04-15 DIAGNOSIS — R26.9 ABNORMALITY OF GAIT: ICD-10-CM

## 2019-04-15 DIAGNOSIS — R53.1 WEAKNESS: Primary | ICD-10-CM

## 2019-04-15 DIAGNOSIS — M25.60 RANGE OF MOTION DEFICIT: ICD-10-CM

## 2019-04-15 PROCEDURE — 97110 THERAPEUTIC EXERCISES: CPT

## 2019-04-15 NOTE — PROGRESS NOTES
"Physical Therapy Re-assessment Note     Name: Flo Mendez .  Clinic Number: [id number]    Therapy Diagnosis:   Encounter Diagnoses   Name Primary?    Weakness Yes    Abnormality of gait     Range of motion deficit      Physician: Adrian Tovar III, *    Visit Date: 4/15/2019  Physician Orders: PT Eval and Treat    Medical Diagnosis:    S83.512A (ICD-10-CM) - Rupture of anterior cruciate ligament of left knee, initial encounter   S83.242A (ICD-10-CM) - Acute medial meniscus tear of left knee, initial encounter   S83.282A (ICD-10-CM) - Acute lateral meniscus tear of left knee, initial encounter         Date of Surgery: 12/18/18  Evaluation Date: 12/19/2018  Authorization Period Expiration: 12/31/18  Plan of Care Certification Period: 6/5/19  Visit # / Visits authorized: 17/25  Aquatic total visits: 1  Total visits:14  Time In: 900am   Time Out: 1000am   Total Billable Time: 50 minutes    Precautions: Standard and Fall; no running in pool as of 2/8/2019    Subjective     Pt reports: no pain in L knee when arrived for tx. Feeling "tired".   He was compliant with home exercise program.  Response to previous treatment: min soreness in medial knee   Functional change: improved gait     Pain: 0/10  Location: left knee      Objective       Knee strength   HH dynamometry   Right   Ext 54kg   Flex 41kg  Left   54kg  Flex 40kg     Leg press test   140lbs   Right RPE 6  Left RPE 8     Flo received aquatic therapeutic exercises to develop strength, ROM and flexibility for 50 minutes including:    Elliptical random lv 1 10'  Band walks 3 laps GTB  Leg press 140lbs   Lateral step ups 30x   Cross over step ups 30x   Lateral lunge walking 40'x 2   Front lunge walk 40'x2   1/2 kneeling ankle DF stretch 10x bilateral              RICE for L knee 10' for decreased circulation, edema, and pain    Home Exercises Provided and Patient Education Provided     Education provided:   Posture awareness     Written Home Exercises " Provided: Patient instructed to cont prior HEP.  Exercises were reviewed and Flo was able to demonstrate them prior to the end of the session.  Flo demonstrated good  understanding of the education provided.       Assessment   Pt tolerating tx well.  Pt has made good gains with physical therapy however weakness remains in the closed chain position.  Leg press test revealed a significant weakness with higher intensity exercises that would mimic IADLs- soccer which is part of his long term goal.  Pt will continue to benefit from skilled PT 2-3x/week x 8 weeks.    Pt prognosis is Good.     Pt will continue to benefit from skilled aquatic outpatient physical therapy to address the deficits listed in the problem list box on initial evaluation, provide pt/family education and to maximize pt's level of independence in the home and community environment.     Pt's spiritual, cultural and educational needs considered and pt agreeable to plan of care and goals.    Anticipated barriers to physical therapy: none    Goals:  In 4-8 weeks, pt. will:  1. Pt will increase ROM to the left knee to 120 degrees of flexion and 0 degrees in extension,  in order to perform ADLs and IADLs more effectively (not met, progressing)  2. Decrease overall pain to the left knee to 2-3/10,  on average with daily activities (not met, progressing)  3. Increase strength to the left knee to 4-/5 grossly throughout,  in order to perform ADLs and IADLs more effectively (not met, progressing)  4. Improve FOTO intake score to 65 to demonstrate improvement with functional mobility and us(not met, progressing)  5. Independent with HEP(not met, progressing)  Long Term GOALS:  In 8-12 weeks, pt. will:  1. Pt will increase ROM to the left knee to WNL,  in order to perform ADLs and IADLs more effectively(not met, progressing)   2. Decrease overall pain to the left knee to 0-2/10,  on average with daily activities (not met, progressing)  3. Increase strength to  the left knee to 5/5 grossly throughout,  in order to perform ADLs and IADLs more effectively (not met, progressing)  4. Improve FOTO intake score to 75 to demonstrate improvement with functional mobility and use(not met, progressing)  5. Independent with HEP(not met, progressing)  6. Return to full personalized workouts unrestricted (not met, progressing)      Plan   Continue physical therapy as planned and progress exercises as tolerated. Pt will continue to benefit from skilled PT 2-3x/week x 8 weeks       Ranjit Hurley, PT, STS

## 2019-05-01 ENCOUNTER — CLINICAL SUPPORT (OUTPATIENT)
Dept: REHABILITATION | Facility: HOSPITAL | Age: 24
End: 2019-05-01
Payer: COMMERCIAL

## 2019-05-01 PROCEDURE — 97110 THERAPEUTIC EXERCISES: CPT

## 2019-05-01 NOTE — PROGRESS NOTES
"Physical Therapy Re-assessment Note     Name: Flo Mendez .  Clinic Number: [id number]    Therapy Diagnosis:   No diagnosis found.  Physician: Adrian Tovar III, *    Visit Date: 5/1/2019  Physician Orders: PT Eval and Treat    Medical Diagnosis:    S83.512A (ICD-10-CM) - Rupture of anterior cruciate ligament of left knee, initial encounter   S83.242A (ICD-10-CM) - Acute medial meniscus tear of left knee, initial encounter   S83.282A (ICD-10-CM) - Acute lateral meniscus tear of left knee, initial encounter         Date of Surgery: 12/18/18  Evaluation Date: 12/19/2018  Authorization Period Expiration: 12/31/18  Plan of Care Certification Period: 6/5/19  Visit # / Visits authorized: 17/25  Aquatic total visits: 1  Total visits:14  Time In: 1540  Time Out: 1625  Total Billable Time: 45 minutes    Precautions: Standard and Fall; no running in pool as of 2/8/2019    Subjective     Pt reports: no pain in L knee when arrived for tx. Feeling "tired".   He was compliant with home exercise program.  Response to previous treatment: min soreness in medial knee   Functional change: improved gait     Pain: 0/10  Location: left knee      Objective       Knee strength   HH dynamometry   Right   Ext 54kg   Flex 41kg  Left   54kg  Flex 40kg     Leg press test   140lbs   Right RPE 6  Left RPE 8     Flo received aquatic therapeutic exercises to develop strength, ROM and flexibility for 50 minutes including:    Elliptical random lv 1 10'  Band walks 3 laps GTB  Leg press 140lbs 30x  Heel raised 140# 30x  SL press 100# 30x   Lateral step ups 30x   Cross over step ups 30x   Lateral lunge walking 40'x 2   Front lunge walk 40'x2 3x10  TRX squats   1/2 kneeling ankle DF stretch 10x bilateral     RICE for L knee 10' for decreased circulation, edema, and pain    Home Exercises Provided and Patient Education Provided     Education provided:   Posture awareness     Written Home Exercises Provided: Patient instructed to cont prior " HEP.  Exercises were reviewed and Flo was able to demonstrate them prior to the end of the session.  Flo demonstrated good  understanding of the education provided.       Assessment   Pt tolerating tx well. Discussing D/C planning with Pt with exercise program next week. .    Pt prognosis is Good.     Pt will continue to benefit from skilled aquatic outpatient physical therapy to address the deficits listed in the problem list box on initial evaluation, provide pt/family education and to maximize pt's level of independence in the home and community environment.     Pt's spiritual, cultural and educational needs considered and pt agreeable to plan of care and goals.    Anticipated barriers to physical therapy: none    Goals:  In 4-8 weeks, pt. will:  1. Pt will increase ROM to the left knee to 120 degrees of flexion and 0 degrees in extension,  in order to perform ADLs and IADLs more effectively (not met, progressing)  2. Decrease overall pain to the left knee to 2-3/10,  on average with daily activities (not met, progressing)  3. Increase strength to the left knee to 4-/5 grossly throughout,  in order to perform ADLs and IADLs more effectively (not met, progressing)  4. Improve FOTO intake score to 65 to demonstrate improvement with functional mobility and us(not met, progressing)  5. Independent with HEP(not met, progressing)  Long Term GOALS:  In 8-12 weeks, pt. will:  1. Pt will increase ROM to the left knee to WNL,  in order to perform ADLs and IADLs more effectively(not met, progressing)   2. Decrease overall pain to the left knee to 0-2/10,  on average with daily activities (not met, progressing)  3. Increase strength to the left knee to 5/5 grossly throughout,  in order to perform ADLs and IADLs more effectively (not met, progressing)  4. Improve FOTO intake score to 75 to demonstrate improvement with functional mobility and use(not met, progressing)  5. Independent with HEP(not met, progressing)  6. Return  to full personalized workouts unrestricted (not met, progressing)      Plan   Continue physical therapy as planned and progress exercises as tolerated. Pt will continue to benefit from skilled PT 2-3x/week x 8 weeks       Rico Fragoso, PTA, STS

## 2019-08-16 ENCOUNTER — OFFICE VISIT (OUTPATIENT)
Dept: DERMATOLOGY | Facility: CLINIC | Age: 24
End: 2019-08-16
Payer: COMMERCIAL

## 2019-08-16 DIAGNOSIS — D22.9 MULTIPLE BENIGN NEVI: Primary | ICD-10-CM

## 2019-08-16 DIAGNOSIS — L81.4 LENTIGO: ICD-10-CM

## 2019-08-16 DIAGNOSIS — Z12.83 SCREENING EXAM FOR SKIN CANCER: ICD-10-CM

## 2019-08-16 PROCEDURE — 99999 PR PBB SHADOW E&M-EST. PATIENT-LVL II: ICD-10-PCS | Mod: PBBFAC,,, | Performed by: DERMATOLOGY

## 2019-08-16 PROCEDURE — 99202 PR OFFICE/OUTPT VISIT, NEW, LEVL II, 15-29 MIN: ICD-10-PCS | Mod: S$GLB,,, | Performed by: DERMATOLOGY

## 2019-08-16 PROCEDURE — 99999 PR PBB SHADOW E&M-EST. PATIENT-LVL II: CPT | Mod: PBBFAC,,, | Performed by: DERMATOLOGY

## 2019-08-16 PROCEDURE — 99202 OFFICE O/P NEW SF 15 MIN: CPT | Mod: S$GLB,,, | Performed by: DERMATOLOGY

## 2019-08-16 NOTE — PROGRESS NOTES
Subjective:       Patient ID:  Flo Mendez Jr. is a 24 y.o. male who presents for   Chief Complaint   Patient presents with    Mole     Here for UBSE    Patient complains of lesion(s)  Location: left axilla  Duration: life  Symptoms: changed in color  Relieving factors/Previous treatments: none    No personal or fly h/o MM        Review of Systems   Skin: Positive for activity-related sunscreen use and recent sunburn (shoulders). Negative for daily sunscreen use.   Hematologic/Lymphatic: Does not bruise/bleed easily.        Objective:    Physical Exam   Constitutional: He appears well-developed and well-nourished. No distress.   Neurological: He is alert and oriented to person, place, and time. He is not disoriented.   Psychiatric: He has a normal mood and affect.   Skin:   Areas Examined (abnormalities noted in diagram):   Head / Face Inspection Performed  Neck Inspection Performed  Chest / Axilla Inspection Performed  Abdomen Inspection Performed  Back Inspection Performed  RUE Inspected  LUE Inspection Performed  Nails and Digits Inspection Performed                   Diagram Legend     Erythematous scaling macule/papule c/w actinic keratosis       Vascular papule c/w angioma      Pigmented verrucoid papule/plaque c/w seborrheic keratosis      Yellow umbilicated papule c/w sebaceous hyperplasia      Irregularly shaped tan macule c/w lentigo     1-2 mm smooth white papules consistent with Milia      Movable subcutaneous cyst with punctum c/w epidermal inclusion cyst      Subcutaneous movable cyst c/w pilar cyst      Firm pink to brown papule c/w dermatofibroma      Pedunculated fleshy papule(s) c/w skin tag(s)      Evenly pigmented macule c/w junctional nevus     Mildly variegated pigmented, slightly irregular-bordered macule c/w mildly atypical nevus      Flesh colored to evenly pigmented papule c/w intradermal nevus       Pink pearly papule/plaque c/w basal cell carcinoma      Erythematous  hyperkeratotic cursted plaque c/w SCC      Surgical scar with no sign of skin cancer recurrence      Open and closed comedones      Inflammatory papules and pustules      Verrucoid papule consistent consistent with wart     Erythematous eczematous patches and plaques     Dystrophic onycholytic nail with subungual debris c/w onychomycosis     Umbilicated papule    Erythematous-base heme-crusted tan verrucoid plaque consistent with inflamed seborrheic keratosis     Erythematous Silvery Scaling Plaque c/w Psoriasis     See annotation      Assessment / Plan:        Multiple benign nevi  upper body skin examination performed today including at least 6 points as noted in physical examination. No lesions suspicious for malignancy noted.  Reassurance provided.  Instructed patient to observe lesion(s) for changes and follow up in clinic if changes are noted. Discussed ABCDE's of moles and brochure provided.      Lentigo including ink spot lentigo - left cheek  This is a benign hyperpigmented sun induced lesion. Daily sun protection will reduce the number of new lesions. Treatment of these benign lesions are considered cosmetic.      Screening exam for skin cancer  Upper body skin examination performed today including at least 6 points as noted in physical examination. No lesions suspicious for malignancy noted.  Also discussed sun protective clothing.                 Follow up if symptoms worsen or fail to improve.

## 2019-09-03 ENCOUNTER — PATIENT MESSAGE (OUTPATIENT)
Dept: PAIN MEDICINE | Facility: CLINIC | Age: 24
End: 2019-09-03

## 2020-02-10 ENCOUNTER — OFFICE VISIT (OUTPATIENT)
Dept: URGENT CARE | Facility: CLINIC | Age: 25
End: 2020-02-10
Payer: COMMERCIAL

## 2020-02-10 VITALS
RESPIRATION RATE: 18 BRPM | TEMPERATURE: 98 F | BODY MASS INDEX: 26.68 KG/M2 | DIASTOLIC BLOOD PRESSURE: 67 MMHG | HEIGHT: 67 IN | HEART RATE: 64 BPM | WEIGHT: 170 LBS | SYSTOLIC BLOOD PRESSURE: 114 MMHG | OXYGEN SATURATION: 97 %

## 2020-02-10 DIAGNOSIS — J32.9 SINUSITIS, UNSPECIFIED CHRONICITY, UNSPECIFIED LOCATION: Primary | ICD-10-CM

## 2020-02-10 PROCEDURE — 99214 OFFICE O/P EST MOD 30 MIN: CPT | Mod: S$GLB,,, | Performed by: FAMILY MEDICINE

## 2020-02-10 PROCEDURE — 99214 PR OFFICE/OUTPT VISIT, EST, LEVL IV, 30-39 MIN: ICD-10-PCS | Mod: S$GLB,,, | Performed by: FAMILY MEDICINE

## 2020-02-10 RX ORDER — AZITHROMYCIN 250 MG/1
TABLET, FILM COATED ORAL
Qty: 6 TABLET | Refills: 0 | Status: SHIPPED | OUTPATIENT
Start: 2020-02-10 | End: 2022-07-06

## 2020-02-10 NOTE — PROGRESS NOTES
"Subjective:       Patient ID: Flo Mendez Jr. is a 24 y.o. male.    Vitals:  height is 5' 7" (1.702 m) and weight is 77.1 kg (170 lb). His temperature is 98.1 °F (36.7 °C). His blood pressure is 114/67 and his pulse is 64. His respiration is 18 and oxygen saturation is 97%.     Chief Complaint: Sinus Problem    Pt c/o nasal congestion, sinus pain and Pressure, post Nasal drip that began 1 month ago.    Sinus Problem   This is a recurrent problem. The current episode started 1 to 4 weeks ago. The problem is unchanged. There has been no fever. His pain is at a severity of 1/10. The pain is mild. Associated symptoms include congestion and sinus pressure. Pertinent negatives include no chills, coughing, diaphoresis, ear pain, shortness of breath or sore throat. Treatments tried: nasal spray, allergy  med. The treatment provided no relief.       Constitution: Negative for chills, sweating, fatigue and fever.   HENT: Positive for congestion, postnasal drip, sinus pain and sinus pressure. Negative for ear pain, sore throat and voice change.    Neck: Negative for painful lymph nodes.   Eyes: Negative for eye redness.   Respiratory: Negative for chest tightness, cough, sputum production, bloody sputum, COPD, shortness of breath, stridor, wheezing and asthma.    Gastrointestinal: Negative for nausea and vomiting.   Musculoskeletal: Negative for muscle ache.   Skin: Negative for rash.   Allergic/Immunologic: Negative for seasonal allergies and asthma.   Hematologic/Lymphatic: Negative for swollen lymph nodes.       Objective:      Physical Exam   Constitutional: He is oriented to person, place, and time. He appears well-developed and well-nourished. He is cooperative.  Non-toxic appearance. He does not have a sickly appearance. He does not appear ill. No distress.   HENT:   Head: Normocephalic and atraumatic.   Right Ear: Hearing, tympanic membrane, external ear and ear canal normal.   Left Ear: Hearing, tympanic " membrane, external ear and ear canal normal.   Nose: Nose normal. No mucosal edema, rhinorrhea or nasal deformity. No epistaxis. Right sinus exhibits no maxillary sinus tenderness and no frontal sinus tenderness. Left sinus exhibits no maxillary sinus tenderness and no frontal sinus tenderness.   Mouth/Throat: Uvula is midline, oropharynx is clear and moist and mucous membranes are normal. No trismus in the jaw. Normal dentition. No uvula swelling. No oropharyngeal exudate, posterior oropharyngeal edema or posterior oropharyngeal erythema.   Diffuse sinus tenderness   Eyes: Conjunctivae and lids are normal. No scleral icterus.   Neck: Trachea normal, full passive range of motion without pain and phonation normal. Neck supple. No neck rigidity. No edema and no erythema present.   Cardiovascular: Normal rate, regular rhythm, normal heart sounds, intact distal pulses and normal pulses.   Pulmonary/Chest: Effort normal and breath sounds normal. No respiratory distress. He has no decreased breath sounds. He has no rhonchi.   Abdominal: Normal appearance.   Musculoskeletal: Normal range of motion. He exhibits no edema or deformity.   Lymphadenopathy:     He has no cervical adenopathy.   Neurological: He is alert and oriented to person, place, and time. He exhibits normal muscle tone. Coordination normal.   Skin: Skin is warm, dry, intact, not diaphoretic and not pale.   Psychiatric: He has a normal mood and affect. His speech is normal and behavior is normal. Judgment and thought content normal. Cognition and memory are normal.   Nursing note and vitals reviewed.        Assessment:       1. Sinusitis, unspecified chronicity, unspecified location        Plan:         Sinusitis, unspecified chronicity, unspecified location  -     azithromycin (Z-LLOYD) 250 MG tablet; 2 on first day then one tablet a day for 4 days  Dispense: 6 tablet; Refill: 0      Patient Instructions     Sinusitis (Antibiotic Treatment)    The sinuses are  air-filled spaces within the bones of the face. They connect to the inside of the nose. Sinusitis is an inflammation of the tissue lining the sinus cavity. Sinus inflammation can occur during a cold. It can also be due to allergies to pollens and other particles in the air. Sinusitis can cause symptoms of sinus congestion and fullness. A sinus infection causes fever, headache and facial pain. There is often green or yellow drainage from the nose or into the back of the throat (post-nasal drip). You have been given antibiotics to treat this condition.  Home care:  · Take the full course of antibiotics as instructed. Do not stop taking them, even if you feel better.  · Drink plenty of water, hot tea, and other liquids. This may help thin mucus. It also may promote sinus drainage.  · Heat may help soothe painful areas of the face. Use a towel soaked in hot water. Or,  the shower and direct the hot spray onto your face. Using a vaporizer along with a menthol rub at night may also help.   · An expectorant containing guaifenesin may help thin the mucus and promote drainage from the sinuses.  · Over-the-counter decongestants may be used unless a similar medicine was prescribed. Nasal sprays work the fastest. Use one that contains phenylephrine or oxymetazoline. First blow the nose gently. Then use the spray. Do not use these medicines more often than directed on the label or symptoms may get worse. You may also use tablets containing pseudoephedrine. Avoid products that combine ingredients, because side effects may be increased. Read labels. You can also ask the pharmacist for help. (NOTE: Persons with high blood pressure should not use decongestants. They can raise blood pressure.)  · Over-the-counter antihistamines may help if allergies contributed to your sinusitis.    · Do not use nasal rinses or irrigation during an acute sinus infection, unless told to by your health care provider. Rinsing may spread the  infection to other sinuses.  · Use acetaminophen or ibuprofen to control pain, unless another pain medicine was prescribed. (If you have chronic liver or kidney disease or ever had a stomach ulcer, talk with your doctor before using these medicines. Aspirin should never be used in anyone under 18 years of age who is ill with a fever. It may cause severe liver damage.)  · Don't smoke. This can worsen symptoms.  Follow-up care  Follow up with your healthcare provider or our staff if you are not improving within the next week.  When to seek medical advice  Call your healthcare provider if any of these occur:  · Facial pain or headache becoming more severe  · Stiff neck  · Unusual drowsiness or confusion  · Swelling of the forehead or eyelids  · Vision problems, including blurred or double vision  · Fever of 100.4ºF (38ºC) or higher, or as directed by your healthcare provider  · Seizure  · Breathing problems  · Symptoms not resolving within 10 days  Date Last Reviewed: 4/13/2015  © 9938-6267 [employer] [address] [place] [address], [place] [address]. All rights reserved. This information is not intended as a substitute for professional medical care. Always follow your healthcare professional's instructions.

## 2020-02-10 NOTE — PATIENT INSTRUCTIONS
Sinusitis (Antibiotic Treatment)    The sinuses are air-filled spaces within the bones of the face. They connect to the inside of the nose. Sinusitis is an inflammation of the tissue lining the sinus cavity. Sinus inflammation can occur during a cold. It can also be due to allergies to pollens and other particles in the air. Sinusitis can cause symptoms of sinus congestion and fullness. A sinus infection causes fever, headache and facial pain. There is often green or yellow drainage from the nose or into the back of the throat (post-nasal drip). You have been given antibiotics to treat this condition.  Home care:  · Take the full course of antibiotics as instructed. Do not stop taking them, even if you feel better.  · Drink plenty of water, hot tea, and other liquids. This may help thin mucus. It also may promote sinus drainage.  · Heat may help soothe painful areas of the face. Use a towel soaked in hot water. Or,  the shower and direct the hot spray onto your face. Using a vaporizer along with a menthol rub at night may also help.   · An expectorant containing guaifenesin may help thin the mucus and promote drainage from the sinuses.  · Over-the-counter decongestants may be used unless a similar medicine was prescribed. Nasal sprays work the fastest. Use one that contains phenylephrine or oxymetazoline. First blow the nose gently. Then use the spray. Do not use these medicines more often than directed on the label or symptoms may get worse. You may also use tablets containing pseudoephedrine. Avoid products that combine ingredients, because side effects may be increased. Read labels. You can also ask the pharmacist for help. (NOTE: Persons with high blood pressure should not use decongestants. They can raise blood pressure.)  · Over-the-counter antihistamines may help if allergies contributed to your sinusitis.    · Do not use nasal rinses or irrigation during an acute sinus infection, unless told to by  your health care provider. Rinsing may spread the infection to other sinuses.  · Use acetaminophen or ibuprofen to control pain, unless another pain medicine was prescribed. (If you have chronic liver or kidney disease or ever had a stomach ulcer, talk with your doctor before using these medicines. Aspirin should never be used in anyone under 18 years of age who is ill with a fever. It may cause severe liver damage.)  · Don't smoke. This can worsen symptoms.  Follow-up care  Follow up with your healthcare provider or our staff if you are not improving within the next week.  When to seek medical advice  Call your healthcare provider if any of these occur:  · Facial pain or headache becoming more severe  · Stiff neck  · Unusual drowsiness or confusion  · Swelling of the forehead or eyelids  · Vision problems, including blurred or double vision  · Fever of 100.4ºF (38ºC) or higher, or as directed by your healthcare provider  · Seizure  · Breathing problems  · Symptoms not resolving within 10 days  Date Last Reviewed: 4/13/2015  © 1478-1238 [email or web address]. [address] [place] [address], [place] [address]. All rights reserved. This information is not intended as a substitute for professional medical care. Always follow your healthcare professional's instructions.

## 2020-04-06 ENCOUNTER — PATIENT MESSAGE (OUTPATIENT)
Dept: SPORTS MEDICINE | Facility: CLINIC | Age: 25
End: 2020-04-06

## 2020-12-09 NOTE — PROGRESS NOTES
"Name: Flo Mendez Jr.  Clinic Number: [id number]  01/30/2017.     Diagnosis: instability of R ankle   Physician: Gerald Ruiz MD  Treatment Orders: PT Eval and Treat    No past medical history on file.  No current outpatient prescriptions on file.     No current facility-administered medications for this visit.      Review of patient's allergies indicates:  No Known Allergies  Precautions: WB as tolerated      Subjective:    Previous PT: yes; hip surgery last year on R hip; states no problems with hip; the expectation was that fixing the hip would help the back, but it didn't   Work history: in school, studying finance at [employer]   Recreational activities/exercise program: recently, not much; was running but it hurt his back. Would like to get back to it.      Flo states walking is getting better. Balance is improving. Continues to have low back sxs.     Pain is rated on a 0-10 scale with 0 being no pain and 10 being pain requiring emergency room visit.    Diagnostic Tests: MRI    Patient Goals for PT: return to running, normal walking, recreational sports      Objective:      Visit # 2  Time In: 1000  Time Out: 1030  Treatment time: 30  Date of eval/re-eval: 1/23/2017        SLB RLE 10"     [2] Neuromuscular re-ed x 30'  SLB leaning over table 3 x 30"   Heel raises 2 x 12 R/L leaning over table   Prone hip extension over pillow to reduce l/s extension; 2 x 8 LLE cues to engage glute first   Quadruped  elbow to hip 2 x 8 done with exhale    Quadruped shoulder flexion 2x8 cues to press palm into table to lat activation     Next visit   Standing hip abd/ext        Written HEP Provided: yes  Patient education: HEP items in standing or in modified quadruped based on sxs.   Flo mathews good understanding of the education provided. Patient demo good return demo of skill of exercises.    Problem List: muscle length impairments, decreased motor control and mobility, impaired ADLs, activity and participation " "restrictions.     Personal factors - back pain; multiple surgeries     CPT Code reference        Hx  Exam  Presentation   Code     Low     0   1-2    Stable    89863     Mod     1-2   3    Evolving    28194     High     3+   4+     Unstable    38448       Assessment:    Physical therapy diagnosis: lumbar rotation syndrome, ankle pronation syndrome  Rehab potential: good    Flo tolerated tx without increase in sxs. Progressed balance and strengthening activities. Also progressed low back/trunk stability activities.     Flo will benefit from skilled PT services to address these impairments and progress towards the below listed functional goals.  Flo is in agreement with the goals that will be addressed in the treatment plan.Flo demonstrates no additional cultural, spiritual or educational needs and currently has no barriers to learning.      Medical necessity: see problem list -  indicates  / mod /  complexity based on clinical presentation that is  / evolving based on sxs in low back affecting rehab and contributingto LE impairments.       Functional Goals  Time frame     1.   The pt will perform SLB on RLE for > 30" indicated improved functional strength.   6 weeks     2.   The pt will demonstrate > 15 heel raises on RLE for improved functional strength.   6 weeks     3.   The pt will ambulate safely and [I] without sxs for > 2 hours without increase in sxs.   6 weeks     4.   The pt will be independent in performance and progression of HEP.     2-3 visits            Plan:      Proceed/Continue with POC.     Pt will be treated by physical therapy 1-2x/week during the certification period. Treatment will consist of therapeutic exercise, manual therapy, neuromuscular re-education, heat and cold modalities, electrical stimulation for pain relief and/or muscle activation, and any other types of treatment hat may be deemed medically necessary. Flo may at times be seen by a PTA as part of the Rehab Team. "       Physical Therapist: AYO Ayoub, PT, DPT, CSCS    I certify the need for these services furnished under this plan of treatment and while under my care.       ___________________________________  Physician/Referring Practitioner        _________________  Date of Signature     You can access the FollowMyHealth Patient Portal offered by [facility] by registering at the following website: [email or web address]. By joining [employer]’s FollowMyHealth portal, you will also be able to view your health information using other applications (apps) compatible with our system.

## 2021-03-24 ENCOUNTER — IMMUNIZATION (OUTPATIENT)
Dept: PRIMARY CARE CLINIC | Facility: CLINIC | Age: 26
End: 2021-03-24
Payer: COMMERCIAL

## 2021-03-24 DIAGNOSIS — Z23 NEED FOR VACCINATION: Primary | ICD-10-CM

## 2021-03-24 PROCEDURE — 91300 COVID-19, MRNA, LNP-S, PF, 30 MCG/0.3 ML DOSE VACCINE: CPT | Mod: S$GLB,,, | Performed by: INTERNAL MEDICINE

## 2021-03-24 PROCEDURE — 0001A COVID-19, MRNA, LNP-S, PF, 30 MCG/0.3 ML DOSE VACCINE: CPT | Mod: CV19,S$GLB,, | Performed by: INTERNAL MEDICINE

## 2021-03-24 PROCEDURE — 91300 COVID-19, MRNA, LNP-S, PF, 30 MCG/0.3 ML DOSE VACCINE: ICD-10-PCS | Mod: S$GLB,,, | Performed by: INTERNAL MEDICINE

## 2021-03-24 PROCEDURE — 0001A COVID-19, MRNA, LNP-S, PF, 30 MCG/0.3 ML DOSE VACCINE: ICD-10-PCS | Mod: CV19,S$GLB,, | Performed by: INTERNAL MEDICINE

## 2021-04-14 ENCOUNTER — IMMUNIZATION (OUTPATIENT)
Dept: PRIMARY CARE CLINIC | Facility: CLINIC | Age: 26
End: 2021-04-14
Payer: COMMERCIAL

## 2021-04-14 DIAGNOSIS — Z23 NEED FOR VACCINATION: Primary | ICD-10-CM

## 2021-04-14 PROCEDURE — 91300 COVID-19, MRNA, LNP-S, PF, 30 MCG/0.3 ML DOSE VACCINE: CPT | Mod: S$GLB,,, | Performed by: INTERNAL MEDICINE

## 2021-04-14 PROCEDURE — 91300 COVID-19, MRNA, LNP-S, PF, 30 MCG/0.3 ML DOSE VACCINE: ICD-10-PCS | Mod: S$GLB,,, | Performed by: INTERNAL MEDICINE

## 2021-04-14 PROCEDURE — 0002A COVID-19, MRNA, LNP-S, PF, 30 MCG/0.3 ML DOSE VACCINE: ICD-10-PCS | Mod: CV19,S$GLB,, | Performed by: INTERNAL MEDICINE

## 2021-04-14 PROCEDURE — 0002A COVID-19, MRNA, LNP-S, PF, 30 MCG/0.3 ML DOSE VACCINE: CPT | Mod: CV19,S$GLB,, | Performed by: INTERNAL MEDICINE

## 2021-08-05 NOTE — PROGRESS NOTES
Please see plan of care for PT eval   Pt s/p L OLIVIA 7/16. Attending Attestation (For Attendings USE Only)...

## 2021-11-04 ENCOUNTER — OFFICE VISIT (OUTPATIENT)
Dept: DERMATOLOGY | Facility: CLINIC | Age: 26
End: 2021-11-04
Payer: COMMERCIAL

## 2021-11-04 DIAGNOSIS — L21.9 SEBORRHEIC DERMATITIS: Primary | ICD-10-CM

## 2021-11-04 PROCEDURE — 99213 OFFICE O/P EST LOW 20 MIN: CPT | Mod: S$PBB,,, | Performed by: STUDENT IN AN ORGANIZED HEALTH CARE EDUCATION/TRAINING PROGRAM

## 2021-11-04 PROCEDURE — 99999 PR PBB SHADOW E&M-EST. PATIENT-LVL II: ICD-10-PCS | Mod: PBBFAC,,, | Performed by: STUDENT IN AN ORGANIZED HEALTH CARE EDUCATION/TRAINING PROGRAM

## 2021-11-04 PROCEDURE — 99999 PR PBB SHADOW E&M-EST. PATIENT-LVL II: CPT | Mod: PBBFAC,,, | Performed by: STUDENT IN AN ORGANIZED HEALTH CARE EDUCATION/TRAINING PROGRAM

## 2021-11-04 PROCEDURE — 99213 PR OFFICE/OUTPT VISIT, EST, LEVL III, 20-29 MIN: ICD-10-PCS | Mod: S$PBB,,, | Performed by: STUDENT IN AN ORGANIZED HEALTH CARE EDUCATION/TRAINING PROGRAM

## 2021-11-04 RX ORDER — KETOCONAZOLE 20 MG/ML
SHAMPOO, SUSPENSION TOPICAL
Qty: 120 ML | Refills: 5 | Status: SHIPPED | OUTPATIENT
Start: 2021-11-04 | End: 2022-07-06

## 2021-11-04 RX ORDER — KETOCONAZOLE 20 MG/G
CREAM TOPICAL
Qty: 60 G | Refills: 3 | Status: SHIPPED | OUTPATIENT
Start: 2021-11-04 | End: 2022-07-06

## 2021-11-04 RX ORDER — FLUOCINONIDE TOPICAL SOLUTION USP, 0.05% 0.5 MG/ML
SOLUTION TOPICAL
Qty: 60 ML | Refills: 3 | Status: SHIPPED | OUTPATIENT
Start: 2021-11-04 | End: 2022-07-06

## 2022-02-02 NOTE — PROGRESS NOTES
Physical Therapy Daily Treatment Note     Name: Flo Mendez .  Clinic Number: [id number]    Therapy Diagnosis:   Encounter Diagnoses   Name Primary?    Weakness Yes    Rupture of anterior cruciate ligament of left knee, initial encounter      Physician: Adrian Tovar III, *    Visit Date: 3/11/2019  Physician Orders: PT Eval and Treat    Medical Diagnosis:    S83.512A (ICD-10-CM) - Rupture of anterior cruciate ligament of left knee, initial encounter   S83.242A (ICD-10-CM) - Acute medial meniscus tear of left knee, initial encounter   S83.282A (ICD-10-CM) - Acute lateral meniscus tear of left knee, initial encounter         Date of Surgery: 12/18/18  Evaluation Date: 12/19/2018  Authorization Period Expiration: 12/31/18  Plan of Care Certification Period: 4/10/19  Visit # / Visits authorized: 15/25  Aquatic total visits: 1  Total visits:14  Time In: 1525  Time Out: 1625  Total Billable Time: 50 minutes    Precautions: Standard and Fall; no running in pool as of 2/8/2019    Subjective     Pt reports: no  pain in L knee when arrived for tx.  Only notes pain while kneeling on knees.   He was compliant with home exercise program.  Response to previous treatment: tolerated tx well  Functional change: improved gait     Pain: 0/10  Location: left knee      Objective     ROM: 3/11/19  Left knee flexion 130   Left knee extension 0         Flo received aquatic therapeutic exercises to develop strength, ROM and flexibility for 50 minutes including:    Bike 10 min  for circulation, ROM, and endurance  LV 4, 80 RPM  Bridge DL on box w/ HS bias  30x   SL Bridges on box 30x    B leg press 120# 3x10  SL Leg press 60# 3x10   squat 45x 25lbd  SLS foam re bounder 2x30  GTB lateral walking 4 laps  Knee ext 5lbs 90-60 degree   Standing calf raise 45x     Lower body stretch 5 min       RICE for L knee 10' for decreased circulation, edema, and pain    Home Exercises Provided and Patient Education Provided     Education  provided:   Posture awareness     Written Home Exercises Provided: Patient instructed to cont prior HEP.  Exercises were reviewed and Flo was able to demonstrate them prior to the end of the session.  Flo demonstrated good  understanding of the education provided.       Assessment   Pt tolerating tx well. VC/TC for quad activation and contraction with therex.  Motion has been restored to jaswinder values, however pt remains weak in the left LE as expected.  Overall, pt has made good gains with rehab and will continue to benefit from skilled PT.  We did progress exercises appropriately.  I did speak with Rico Lynch PTA regarding POC.    Pt prognosis is Good.     Pt will continue to benefit from skilled aquatic outpatient physical therapy to address the deficits listed in the problem list box on initial evaluation, provide pt/family education and to maximize pt's level of independence in the home and community environment.     Pt's spiritual, cultural and educational needs considered and pt agreeable to plan of care and goals.    Anticipated barriers to physical therapy: none    Goals:  In 4-8 weeks, pt. will:  1. Pt will increase ROM to the left knee to 120 degrees of flexion and 0 degrees in extension,  in order to perform ADLs and IADLs more effectively (not met, progressing)  2. Decrease overall pain to the left knee to 2-3/10,  on average with daily activities (not met, progressing)  3. Increase strength to the left knee to 4-/5 grossly throughout,  in order to perform ADLs and IADLs more effectively (not met, progressing)  4. Improve FOTO intake score to 65 to demonstrate improvement with functional mobility and us(not met, progressing)  5. Independent with HEP(not met, progressing)  Long Term GOALS:  In 8-12 weeks, pt. will:  1. Pt will increase ROM to the left knee to WNL,  in order to perform ADLs and IADLs more effectively(not met, progressing)   2. Decrease overall pain to the left knee to 0-2/10,  on  average with daily activities (not met, progressing)  3. Increase strength to the left knee to 5/5 grossly throughout,  in order to perform ADLs and IADLs more effectively (not met, progressing)  4. Improve FOTO intake score to 75 to demonstrate improvement with functional mobility and use(not met, progressing)  5. Independent with HEP(not met, progressing)  6. Return to full personalized workouts unrestricted (not met, progressing)      Plan     Outpatient Physical Therapy 3 times weekly for 10 -16 weeks to include the following interventions: Aquatic Therapy, Gait Training, Manual Therapy, Moist Heat/ Ice, Neuromuscular Re-ed, Patient Education, Self Care, Therapeutic Activites and Therapeutic Exercise.        Ranjit Hurley, PT, STS    [Initial Evaluation] : an initial evaluation [FreeTextEntry1] : upper abd pain and dyspepsia

## 2022-06-02 ENCOUNTER — RESULT REVIEW (OUTPATIENT)
Age: 27
End: 2022-06-02

## 2022-06-02 ENCOUNTER — OUTPATIENT (OUTPATIENT)
Dept: OUTPATIENT SERVICES | Facility: HOSPITAL | Age: 27
LOS: 1 days | End: 2022-06-02
Payer: COMMERCIAL

## 2022-06-02 ENCOUNTER — APPOINTMENT (OUTPATIENT)
Dept: RADIOLOGY | Facility: CLINIC | Age: 27
End: 2022-06-02

## 2022-06-02 PROCEDURE — 73564 X-RAY EXAM KNEE 4 OR MORE: CPT | Mod: 26,LT

## 2022-07-06 ENCOUNTER — OFFICE VISIT (OUTPATIENT)
Dept: SPORTS MEDICINE | Facility: CLINIC | Age: 27
End: 2022-07-06
Payer: COMMERCIAL

## 2022-07-06 ENCOUNTER — HOSPITAL ENCOUNTER (OUTPATIENT)
Dept: RADIOLOGY | Facility: HOSPITAL | Age: 27
Discharge: HOME | End: 2022-07-06
Attending: PHYSICIAN ASSISTANT
Payer: COMMERCIAL

## 2022-07-06 VITALS
SYSTOLIC BLOOD PRESSURE: 124 MMHG | DIASTOLIC BLOOD PRESSURE: 82 MMHG | HEART RATE: 68 BPM | TEMPERATURE: 97 F | WEIGHT: 175 LBS | BODY MASS INDEX: 27.47 KG/M2 | HEIGHT: 67 IN

## 2022-07-06 DIAGNOSIS — Z98.890 S/P ACL RECONSTRUCTION: ICD-10-CM

## 2022-07-06 DIAGNOSIS — M25.562 LEFT KNEE PAIN, UNSPECIFIED CHRONICITY: ICD-10-CM

## 2022-07-06 DIAGNOSIS — M25.562 ACUTE PAIN OF LEFT KNEE: Primary | ICD-10-CM

## 2022-07-06 DIAGNOSIS — M76.52 PATELLAR TENDINITIS OF LEFT KNEE: ICD-10-CM

## 2022-07-06 PROCEDURE — 99999 PR PBB SHADOW E&M-EST. PATIENT-LVL IV: ICD-10-PCS | Mod: PBBFAC,,, | Performed by: PHYSICIAN ASSISTANT

## 2022-07-06 PROCEDURE — 99204 OFFICE O/P NEW MOD 45 MIN: CPT | Mod: S$GLB,,, | Performed by: PHYSICIAN ASSISTANT

## 2022-07-06 PROCEDURE — 1159F PR MEDICATION LIST DOCUMENTED IN MEDICAL RECORD: ICD-10-PCS | Mod: CPTII,S$GLB,, | Performed by: PHYSICIAN ASSISTANT

## 2022-07-06 PROCEDURE — 99999 PR PBB SHADOW E&M-EST. PATIENT-LVL IV: CPT | Mod: PBBFAC,,, | Performed by: PHYSICIAN ASSISTANT

## 2022-07-06 PROCEDURE — 3079F DIAST BP 80-89 MM HG: CPT | Mod: CPTII,S$GLB,, | Performed by: PHYSICIAN ASSISTANT

## 2022-07-06 PROCEDURE — 3074F SYST BP LT 130 MM HG: CPT | Mod: CPTII,S$GLB,, | Performed by: PHYSICIAN ASSISTANT

## 2022-07-06 PROCEDURE — 3008F PR BODY MASS INDEX (BMI) DOCUMENTED: ICD-10-PCS | Mod: CPTII,S$GLB,, | Performed by: PHYSICIAN ASSISTANT

## 2022-07-06 PROCEDURE — 1159F MED LIST DOCD IN RCRD: CPT | Mod: CPTII,S$GLB,, | Performed by: PHYSICIAN ASSISTANT

## 2022-07-06 PROCEDURE — 3074F PR MOST RECENT SYSTOLIC BLOOD PRESSURE < 130 MM HG: ICD-10-PCS | Mod: CPTII,S$GLB,, | Performed by: PHYSICIAN ASSISTANT

## 2022-07-06 PROCEDURE — 3008F BODY MASS INDEX DOCD: CPT | Mod: CPTII,S$GLB,, | Performed by: PHYSICIAN ASSISTANT

## 2022-07-06 PROCEDURE — 73564 X-RAY EXAM KNEE 4 OR MORE: CPT | Mod: 26,,, | Performed by: RADIOLOGY

## 2022-07-06 PROCEDURE — 1160F PR REVIEW ALL MEDS BY PRESCRIBER/CLIN PHARMACIST DOCUMENTED: ICD-10-PCS | Mod: CPTII,S$GLB,, | Performed by: PHYSICIAN ASSISTANT

## 2022-07-06 PROCEDURE — 73564 X-RAY EXAM KNEE 4 OR MORE: CPT | Mod: TC,50

## 2022-07-06 PROCEDURE — 99204 PR OFFICE/OUTPT VISIT, NEW, LEVL IV, 45-59 MIN: ICD-10-PCS | Mod: S$GLB,,, | Performed by: PHYSICIAN ASSISTANT

## 2022-07-06 PROCEDURE — 73564 XR KNEE ORTHO BILAT WITH FLEXION: ICD-10-PCS | Mod: 26,,, | Performed by: RADIOLOGY

## 2022-07-06 PROCEDURE — 3079F PR MOST RECENT DIASTOLIC BLOOD PRESSURE 80-89 MM HG: ICD-10-PCS | Mod: CPTII,S$GLB,, | Performed by: PHYSICIAN ASSISTANT

## 2022-07-06 PROCEDURE — 1160F RVW MEDS BY RX/DR IN RCRD: CPT | Mod: CPTII,S$GLB,, | Performed by: PHYSICIAN ASSISTANT

## 2022-07-06 NOTE — PROGRESS NOTES
CC: left knee pain    27 y.o. Male , who reports anterior knee pain refractory to conservative mgmt.    He has been doing well following below ACL reconstruction. Recently had right ankle surgery at [facility] 6 months ago and completed PT for this 1-2 months ago. He reports his lower leg on this side still feels weak.     Recently he was traveling to [place] and [place] and doing a lot of walking and about 3 weeks ago while in [place] he began having anterior knee pain in the area of his proximal patella tendon. He reports localized swelling to this area that he had aspirated while in [place]. 10cc was removed likely from the prepatella bursa area. This helped his pain some. He still feels a little knot in this area. Here wondering if he needs repeat drainage.     Reports slight occasional brief medial knee pain prior to this that did not cause him much issue.       Is affecting ADLs.     No mechanical symptoms, no instability    DATE OF PROCEDURE: 12/18/2018  SURGEON:  Susan Nguyen M.D  PROCEDURE PERFORMED:   left  1. knee arthroscopic ACL reconstruction with patellar tendon autograft (CPT 61730)  2. Knee application of xenograft (Artelon tissue reinforcement) to ACL (CPT 26157)  3. knee arthroscopic partial synovectomy/debridement (CPT 98609).   4. knee arthroscopic plica excision (CPT 48471).   5. knee open patellar tendon repair  6. knee arthroscopic medial meniscus repair (CPT 09893) (complex, -22 modifier)  6. knee arthroscopic partial lateral meniscectomy (CPT 17648)   7. knee autologous bone grafting to patellar and tibial harvest sites  8. knee arthroscopic chondroplasty (CPT 87578)    There was chondral damage to:  Medial femoral condyle 5 x 7 mm grade 1  Chondroplasty was performed using arthroscopic shaver.    Review of Systems   Constitution: Negative. Negative for chills, fever and night sweats.   HENT: Negative for congestion and headaches.    Eyes: Negative for blurred vision, left  vision loss and right vision loss.   Cardiovascular: Negative for chest pain and syncope.   Respiratory: Negative for cough and shortness of breath.    Endocrine: Negative for polydipsia, polyphagia and polyuria.   Hematologic/Lymphatic: Negative for bleeding problem. Does not bruise/bleed easily.   Skin: Negative for dry skin, itching and rash.   Musculoskeletal: Negative for falls and muscle weakness.   Gastrointestinal: Negative for abdominal pain and bowel incontinence.   Genitourinary: Negative for bladder incontinence and nocturia.   Neurological: Negative for disturbances in coordination, loss of balance and seizures.   Psychiatric/Behavioral: Negative for depression. The patient does not have insomnia.    Allergic/Immunologic: Negative for hives and persistent infections.   All other systems negative      PAST MEDICAL HISTORY:   Past Medical History:   Diagnosis Date    Allergy      PAST SURGICAL HISTORY:   Past Surgical History:   Procedure Laterality Date    ANKLE SURGERY      BONE GRAFT Left 12/18/2018    Procedure: AUTOLOGUS BONE GRAFTING TO PATELLAR AND TIBIAL HARVEST SITES;  Surgeon: Susan Nguyen MD;  Location: [facility];  Service: Orthopedics;  Laterality: Left;    HIP SURGERY Right 2/11/2016    Dr Nguyen     KNEE ARTHROSCOPY W/ ACL RECONSTRUCTION Left 12/18/2018    Procedure: ARTHROSCOPIC ACL RECONSTRUCTION WITH PATELLAR TENDON AUTOGRAFT;  Surgeon: Susan Nguyen MD;  Location: [facility];  Service: Orthopedics;  Laterality: Left;  Regional w/o Catheter, Adductor    KNEE ARTHROSCOPY W/ MENISCECTOMY Left 12/18/2018    Procedure: ARTHROSCOPY, KNEE, WITH  PARTIAL LATERAL MENISCECTOMY;  Surgeon: Susan Nguyen MD;  Location: [facility];  Service: Orthopedics;  Laterality: Left;    KNEE ARTHROSCOPY W/ PLICA EXCISION Right 12/18/2018    Procedure: EXCISION, PLICA, KNEE, ARTHROSCOPIC;  Surgeon: Susan Nguyen MD;  Location: [facility];  Service: Orthopedics;  Laterality: Right;    REPAIR OF MENISCUS OF KNEE Left  "12/18/2018    Procedure: REPAIR, ARTHROSCOPIC  MEDIAL MENISCUS, KNEE;  Surgeon: Susan Nguyen MD;  Location: [facility];  Service: Orthopedics;  Laterality: Left;    SYNOVECTOMY OF KNEE Left 12/18/2018    Procedure: PARTIAL SYNOVECTOMY, KNEE;  Surgeon: Susan Nguyen MD;  Location: [facility] OR;  Service: Orthopedics;  Laterality: Left;    WISDOM TOOTH EXTRACTION       FAMILY HISTORY:   Family History   Problem Relation Age of Onset    Melanoma Mother     No Known Problems Father      SOCIAL HISTORY:   Social History     Socioeconomic History    Marital status: Single   Tobacco Use    Smoking status: Current Some Day Smoker     Packs/day: 0.50     Years: 2.00     Pack years: 1.00     Types: Cigarettes    Smokeless tobacco: Never Used    Tobacco comment: encouraged pt to quit   Substance and Sexual Activity    Alcohol use: Yes     Alcohol/week: 0.0 standard drinks     Comment: social     Drug use: No       MEDICATIONS:   Current Outpatient Medications:     azithromycin (Z-LLOYD) 250 MG tablet, 2 on first day then one tablet a day for 4 days (Patient not taking: Reported on 11/4/2021), Disp: 6 tablet, Rfl: 0    fluocinonide (LIDEX) 0.05 % external solution, AAA scalp qday - bid prn pruritus (Patient not taking: Reported on 7/6/2022), Disp: 60 mL, Rfl: 3    ketoconazole (NIZORAL) 2 % cream, AAA bid, Disp: 60 g, Rfl: 3    ketoconazole (NIZORAL) 2 % shampoo, Wash hair with medicated shampoo at least 2x/week - let sit on scalp at least 5 minutes prior to rinsing (Patient not taking: Reported on 7/6/2022), Disp: 120 mL, Rfl: 5  ALLERGIES:   Review of patient's allergies indicates:   Allergen Reactions    Pcn [penicillins] Rash       VITAL SIGNS: /82   Pulse 68   Temp 97.2 °F (36.2 °C)   Ht 5' 7" (1.702 m)   Wt 79.4 kg (175 lb)   BMI 27.41 kg/m²      PHYSICAL EXAMINATION    General:  The patient is alert and oriented x 3.  Mood is pleasant.  Observation of ears, eyes and nose reveal no gross abnormalities.  " HEENT: NCAT, sclera nonicteric  Lungs: Respirations are equal and unlabored.    left  KNEE EXAMINATION     OBSERVATION / INSPECTION   Gait:   Nonantalgic   Alignment:  Neutral   Scars:   Healed anterior acl surgical incision of anterior medial knee.    Muscle atrophy: Mild  Effusion:  None   Warmth:  None   Discoloration:   none     TENDERNESS / CREPITUS (T / C):          T / C      T / C   Patella   - / -   Lateral joint line   - / -      Peripatellar medial  -  Medial joint line    - / -   Peripatellar lateral -  Medial plica   - / -   Patellar tendon Slight + with small half marble size tissue knot appreciated.   Popliteal fossa  - / -   Quad tendon   -   Gastrocnemius   -   Prepatellar Bursa - / -   Quadricep   -   Tibial tubercle  -  Thigh/hamstring  -   Pes anserine/HS -  Fibula    -   ITB   - / -  Tibia     -   Tib/fib joint  - / -  LCL    -     MFC   - / -   MCL: Proximal  -    LFC   - / -    Distal   -          ROM: (* = pain)  PASSIVE   ACTIVE    Left :   5 / 0 / 145   5 / 0 / 145     Right :    5 / 0 / 145   5 / 0 / 145    Patellofemoral examination:  See above noted areas of tenderness.   Patella position    Subluxation / dislocation: Centered           Sup. / Inf;   Normal   Crepitus (PF):    Absent   Patellar Mobility:       Medial-lateral:   Normal    Superior-inferior:  Normal    Inferior tilt   Normal    Patellar tendon:  Normal   Lateral tilt:    Normal   J-sign:     None   Patellofemoral grind:   +       MENISCAL SIGNS:     Pain on terminal extension:  -  Pain on terminal flexion:  -  Servandos maneuver:  -  Squat     -    LIGAMENT EXAMINATION:  ACL / Lachman:  normal (-1 to 2mm)    PCL-Post.  drawer: normal 0 to 2mm  MCL- Valgus:  normal 0 to 2mm  LCL- Varus:  normal 0 to 2mm  Pivot shift: normal (Equal)   Dial Test: difference c/w other side   At 30° flexion: normal (< 5°)    At 90° flexion: normal (< 5°)   Reverse Pivot Shift:   normal (Equal)     STRENGTH: (* = with pain) PAINFUL  SIDE   Quadricep   5/5   Hamstrin/5    EXTREMITY NEURO-VASCULAR EXAMINATION:   Sensation:  Grossly intact to light touch all dermatomal regions.   Motor Function:  Fully intact motor function at hip, knee, foot and ankle    DTRs;  quadriceps and  achilles 2+.  No clonus and downgoing Babinski.    Vascular status:  DP and PT pulses 2+, brisk capillary refill, symmetric.     Other Findings:  + step down bilat  + bridge test     Xrays:  Xrays of the bilateral knees with flexion were ordered and reviewed by me today. No fracture, subluxation, or other significant bony or joint abnormality is identified. Mild DJD with mild joint space narrowing of the left medial knee compartment. enthesophyte noted of the inferior patella in area where bone plug for ACL was taken.      ASSESSMENT:    1. Left Knee pain, acute  2. Likely patella tendinitis with some localized bursa or cyst swelling.   Bilateral hip abd/core weakness    PLAN:    1. Nothing to drain today. No knee effusion reported.   2. Ice compresses prn pain  3. He is moving to [facility] in 2-3 weeks. Recommended he revisit PT prior to leaving to help his issue.   PT for 1-2 session for HEP for left patella tendinitis and core and glute weakness with possible dry needling to patella tendon area  4. Try voltaren gel to proximal patella tendon to help.     RTC as needed or reach out for recs when in [facility] if needed.      All questions were answered, pt will contact us for questions or concerns in the interim.    I made the decision to obtain old records of the patient including previous notes and imaging. New imaging was ordered today of the extremity or extremities evaluated. I independently reviewed and interpreted the radiographs and/or MRIs today as well as prior imaging.

## 2022-07-22 ENCOUNTER — PATIENT MESSAGE (OUTPATIENT)
Dept: SPORTS MEDICINE | Facility: CLINIC | Age: 27
End: 2022-07-22
Payer: COMMERCIAL

## 2022-12-28 NOTE — PROGRESS NOTES
"Name: Flo Mendez Jr.  Clinic Number: [id number]  06/21/2017.     Diagnosis: instability of R ankle   Physician: Gerald Ruiz MD  Treatment Orders: PT Eval and Treat    No past medical history on file.  No current outpatient prescriptions on file.     No current facility-administered medications for this visit.      Review of patient's allergies indicates:  No Known Allergies  Precautions: WB as tolerated      Visit # 19  Time In: 09:48 am  Time Out: 10:45  am  Treatment time: 57 minutes   Date of eval/re-eval: 1/23/2017  Plan of care expiration: 6/16/17    Subjective:      Patient states that his ankle is feeling pretty good today  Pain: 4 / 10       Objective:        SL balance on R: x 34 seconds  R ankle dorsiflexion AROM with knee at 0 = 20 degrees  R ankle dorsiflexion AROM with knee at 90 = 20 degrees  R ankle inversion AROM = 25 degrees  R ankle eversion AROM = 25 degrees  Gait: pt presents with normal gait, even stride length bilaterally, mild overpronation without shoes.    Pt received therapeutic exercises to develop strength, endurance, ROM and flexibility for 57 minutes including: reassessment    Elliptical: 6 minutes  SL balance 3 x 30"  QL stretching 2 legs off table  X 1 minute  Ql stretch 1 leg off table with upper body contralateral rotation x 1 minute  Positional distraction x 2 minutes  Standing QL stretch at wall: 2  X 30 seconds  Thread the needle x 20                Written HEP Provided:QL stretches, thread the needle  Patient education: for with new exercises for HEP  Flo mathews good understanding of the education provided. Patient demo good return demo of skill of exercises.    Problem List: muscle length impairments, decreased motor control and mobility, impaired ADLs, activity and participation restrictions.     Personal factors - back pain; multiple surgeries     CPT Code reference        Hx  Exam  Presentation   Code     Low     0   1-2    Stable    78953     Mod     1-2   3  " "  Evolving    52471     High     3+   4+     Unstable    44817       Assessment:        Flo has met all of his goals for PT. He is appropriate to discharge from therapy at this time and continue with his HEP.      Functional Goals  Time frame      1.   The pt will perform SLB on RLE for > 30" indicated improved functional strength.   6 weeks  Met     2.   The pt will demonstrate > 15 heel raises on RLE for improved functional strength.   6 weeks Met 5/5/17     3.   The pt will ambulate safely and [I] without sxs for > 2 hours without increase in sxs.   6 weeks Met     4.   The pt will be independent in performance and progression of HEP.     2-3 visits Met             Plan:      Pt is discharged from PT.        Physical Therapist: Miguel Coto, PT     _  ;    " Quality 265: Biopsy Follow-Up: Biopsy results reviewed, communicated, tracked, and documented Detail Level: Zone

## (undated) DEVICE — BLADE SHAVER 4.5 6/BX

## (undated) DEVICE — APPLICATOR CHLORAPREP ORN 26ML

## (undated) DEVICE — SUT 38 FIBERWIRE #2

## (undated) DEVICE — TUBE SET INFLOW/OUTFLOW

## (undated) DEVICE — Device

## (undated) DEVICE — COVER MAYO STAND REINFRCD 30

## (undated) DEVICE — CONTAINER SPECIMEN STRL 4OZ

## (undated) DEVICE — POSITIONER IV ARMBOARD FOAM

## (undated) DEVICE — BLADE 4.2MM PREBENT ULTRACUT

## (undated) DEVICE — SUT MCRYL PLUS 4-0 PS2 27IN

## (undated) DEVICE — SEE MEDLINE ITEM 157117

## (undated) DEVICE — SUT VICRYL PLUS 0 CT1 18IN

## (undated) DEVICE — TOURNIQUET SB QC SP 34X4IN

## (undated) DEVICE — BLADE LONG 31.0MM X 9.0MM

## (undated) DEVICE — SEE MEDLINE ITEM 152530

## (undated) DEVICE — SUT VICRYL PLUS 3-0 SH 18IN

## (undated) DEVICE — PROBE ARTHSCP EDGE  90 SUCTION

## (undated) DEVICE — PENCIL ELECTROSURG HOLST W/BLD

## (undated) DEVICE — SEE MEDLINE ITEM 153151

## (undated) DEVICE — PAD CAST SPECIALIST STRL 6

## (undated) DEVICE — PAD COLD THERAPY KNEE WRAP ON

## (undated) DEVICE — SOL IRR NACL .9% 3000ML

## (undated) DEVICE — SHAVER SYS 5.5 ULRAFRR

## (undated) DEVICE — UNDERGLOVES BIOGEL PI SIZE 7.5

## (undated) DEVICE — BLADE KNIFE GRAFT 9MM PARALLEL

## (undated) DEVICE — PAD ELECTRODE STER 1.5X3

## (undated) DEVICE — SEE MEDLINE ITEM 157150

## (undated) DEVICE — SEE MEDLINE ITEM 157126

## (undated) DEVICE — GLOVE BIOGEL SKINSENSE PI 7.0

## (undated) DEVICE — SEE MEDLINE ITEM 152487

## (undated) DEVICE — GAUZE SPONGE 4X4 12PLY

## (undated) DEVICE — PAD ABD 8X10 STERILE

## (undated) DEVICE — DRESSING XEROFORM FOIL PK 1X8

## (undated) DEVICE — SEE MEDLINE ITEM 152622

## (undated) DEVICE — SEE MEDLINE ITEM 152523

## (undated) DEVICE — KIT ACL DISP W/O SAW BLADE

## (undated) DEVICE — SEE MEDLINE ITEM 157169

## (undated) DEVICE — SUT 2 20 FIBERLOOP STR NDL

## (undated) DEVICE — DRAPE STERI INSTRUMENT 1018

## (undated) DEVICE — CLOSURE SKIN STERI STRIP 1/2X4

## (undated) DEVICE — GOWN SMART IMP BREATHABLE XXLG

## (undated) DEVICE — SKIN MARKER DEVON 160

## (undated) DEVICE — BIT DRILL SENTINEL 10.5MMX9IN

## (undated) DEVICE — GLOVE ORTHO PF SZ 8.5

## (undated) DEVICE — ADHESIVE MASTISOL VIAL 48/BX

## (undated) DEVICE — PROBE ARTHO ENERGY 90 DEG

## (undated) DEVICE — GLOVE SURGEON SYN PF SZ 9

## (undated) DEVICE — BLADE SURG STAINLESS STEEL #15

## (undated) DEVICE — SOL 9P NACL IRR PIC IL

## (undated) DEVICE — STRIP STERI REIN CLSR 1/2X2IN

## (undated) DEVICE — NDL 18GA X1 1/2 REG BEVEL

## (undated) DEVICE — SUT FIBERWIRE FIBER 2M

## (undated) DEVICE — BRACE KNEE T SCOPE PREMIER

## (undated) DEVICE — REAMER CORING 9MM & COLLAR PIN